# Patient Record
Sex: MALE | Race: WHITE | NOT HISPANIC OR LATINO
[De-identification: names, ages, dates, MRNs, and addresses within clinical notes are randomized per-mention and may not be internally consistent; named-entity substitution may affect disease eponyms.]

---

## 2017-05-05 ENCOUNTER — LABORATORY RESULT (OUTPATIENT)
Age: 82
End: 2017-05-05

## 2017-05-05 ENCOUNTER — APPOINTMENT (OUTPATIENT)
Dept: HEART AND VASCULAR | Facility: CLINIC | Age: 82
End: 2017-05-05

## 2017-05-05 VITALS
HEIGHT: 71 IN | HEART RATE: 63 BPM | SYSTOLIC BLOOD PRESSURE: 122 MMHG | DIASTOLIC BLOOD PRESSURE: 72 MMHG | BODY MASS INDEX: 19.88 KG/M2 | WEIGHT: 142 LBS

## 2017-05-05 DIAGNOSIS — Z00.00 ENCOUNTER FOR GENERAL ADULT MEDICAL EXAMINATION W/OUT ABNORMAL FINDINGS: ICD-10-CM

## 2017-05-05 DIAGNOSIS — E03.9 HYPOTHYROIDISM, UNSPECIFIED: ICD-10-CM

## 2017-05-05 DIAGNOSIS — Z82.49 FAMILY HISTORY OF ISCHEMIC HEART DISEASE AND OTHER DISEASES OF THE CIRCULATORY SYSTEM: ICD-10-CM

## 2017-05-05 DIAGNOSIS — N42.9 DISORDER OF PROSTATE, UNSPECIFIED: ICD-10-CM

## 2017-05-05 DIAGNOSIS — R73.9 HYPERGLYCEMIA, UNSPECIFIED: ICD-10-CM

## 2017-05-05 RX ORDER — TAMSULOSIN HCL 0.4 MG
0.4 CAPSULE ORAL
Refills: 0 | Status: ACTIVE | COMMUNITY

## 2017-05-05 RX ORDER — PNEUMOCOCCAL 13-VALENT CONJUGATE VACCINE 2.2; 2.2; 2.2; 2.2; 2.2; 4.4; 2.2; 2.2; 2.2; 2.2; 2.2; 2.2; 2.2 UG/.5ML; UG/.5ML; UG/.5ML; UG/.5ML; UG/.5ML; UG/.5ML; UG/.5ML; UG/.5ML; UG/.5ML; UG/.5ML; UG/.5ML; UG/.5ML; UG/.5ML
INJECTION, SUSPENSION INTRAMUSCULAR
Qty: 1 | Refills: 0 | Status: ACTIVE | COMMUNITY
Start: 2017-05-05 | End: 1900-01-01

## 2017-05-06 PROBLEM — Z82.49 FAMILY HISTORY OF ACUTE MYOCARDIAL INFARCTION: Status: ACTIVE | Noted: 2017-05-06

## 2017-05-06 PROBLEM — N42.9 PROSTATE DISORDER: Status: ACTIVE | Noted: 2017-05-06

## 2017-05-07 PROBLEM — R73.9 HYPERGLYCEMIA: Status: ACTIVE | Noted: 2017-05-07

## 2017-05-07 PROBLEM — E03.9 HYPOTHYROIDISM, ADULT: Status: ACTIVE | Noted: 2017-05-07

## 2017-05-09 LAB
ALBUMIN SERPL ELPH-MCNC: 4.5 G/DL
ALP BLD-CCNC: 71 U/L
ALT SERPL-CCNC: 17 U/L
ANION GAP SERPL CALC-SCNC: 18 MMOL/L
AST SERPL-CCNC: 19 U/L
BASOPHILS # BLD AUTO: 0.02 K/UL
BASOPHILS NFR BLD AUTO: 0.3 %
BILIRUB SERPL-MCNC: 0.6 MG/DL
BUN SERPL-MCNC: 15 MG/DL
CALCIUM SERPL-MCNC: 9.5 MG/DL
CHLORIDE SERPL-SCNC: 99 MMOL/L
CHOLEST SERPL-MCNC: 191 MG/DL
CHOLEST/HDLC SERPL: 3.5 RATIO
CO2 SERPL-SCNC: 24 MMOL/L
CREAT SERPL-MCNC: 0.98 MG/DL
EOSINOPHIL # BLD AUTO: 0.08 K/UL
EOSINOPHIL NFR BLD AUTO: 1 %
GLUCOSE SERPL-MCNC: 103 MG/DL
HBA1C MFR BLD HPLC: 5.5 %
HCT VFR BLD CALC: 45.3 %
HDLC SERPL-MCNC: 55 MG/DL
HGB BLD-MCNC: 15.1 G/DL
IMM GRANULOCYTES NFR BLD AUTO: 0.1 %
LDLC SERPL CALC-MCNC: 122 MG/DL
LYMPHOCYTES # BLD AUTO: 1.7 K/UL
LYMPHOCYTES NFR BLD AUTO: 21.7 %
MAN DIFF?: NORMAL
MCHC RBC-ENTMCNC: 31.7 PG
MCHC RBC-ENTMCNC: 33.3 GM/DL
MCV RBC AUTO: 95.2 FL
MONOCYTES # BLD AUTO: 0.59 K/UL
MONOCYTES NFR BLD AUTO: 7.5 %
NEUTROPHILS # BLD AUTO: 5.43 K/UL
NEUTROPHILS NFR BLD AUTO: 69.4 %
PLATELET # BLD AUTO: 245 K/UL
POTASSIUM SERPL-SCNC: 4.3 MMOL/L
PROT SERPL-MCNC: 7.5 G/DL
RBC # BLD: 4.76 M/UL
RBC # FLD: 13.1 %
SODIUM SERPL-SCNC: 141 MMOL/L
TRIGL SERPL-MCNC: 72 MG/DL
TSH SERPL-ACNC: 7.6 UIU/ML
WBC # FLD AUTO: 7.83 K/UL

## 2019-07-25 ENCOUNTER — EMERGENCY (EMERGENCY)
Facility: HOSPITAL | Age: 84
LOS: 1 days | Discharge: HOME | End: 2019-07-25
Attending: EMERGENCY MEDICINE | Admitting: EMERGENCY MEDICINE
Payer: MEDICARE

## 2019-07-25 VITALS
HEART RATE: 72 BPM | SYSTOLIC BLOOD PRESSURE: 145 MMHG | DIASTOLIC BLOOD PRESSURE: 71 MMHG | RESPIRATION RATE: 18 BRPM | TEMPERATURE: 99 F | OXYGEN SATURATION: 96 % | WEIGHT: 149.91 LBS

## 2019-07-25 VITALS
SYSTOLIC BLOOD PRESSURE: 148 MMHG | OXYGEN SATURATION: 97 % | DIASTOLIC BLOOD PRESSURE: 75 MMHG | RESPIRATION RATE: 18 BRPM | TEMPERATURE: 98 F | HEART RATE: 62 BPM
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
## 2019-07-25 DIAGNOSIS — R13.10 DYSPHAGIA, UNSPECIFIED: ICD-10-CM
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
## 2019-07-25 DIAGNOSIS — T18.128A FOOD IN ESOPHAGUS CAUSING OTHER INJURY, INITIAL ENCOUNTER: ICD-10-CM

## 2019-07-25 LAB
ANION GAP SERPL CALC-SCNC: 13 MMOL/L — SIGNIFICANT CHANGE UP (ref 5–17)
APTT BLD: 30.9 SEC — SIGNIFICANT CHANGE UP (ref 27.5–36.3)
BASOPHILS # BLD AUTO: 0.03 K/UL — SIGNIFICANT CHANGE UP (ref 0–0.2)
BASOPHILS NFR BLD AUTO: 0.3 % — SIGNIFICANT CHANGE UP (ref 0–2)
BUN SERPL-MCNC: 25 MG/DL — HIGH (ref 7–23)
CALCIUM SERPL-MCNC: 9.4 MG/DL — SIGNIFICANT CHANGE UP (ref 8.4–10.5)
CHLORIDE SERPL-SCNC: 107 MMOL/L — SIGNIFICANT CHANGE UP (ref 96–108)
CO2 SERPL-SCNC: 24 MMOL/L — SIGNIFICANT CHANGE UP (ref 22–31)
CREAT SERPL-MCNC: 1.12 MG/DL — SIGNIFICANT CHANGE UP (ref 0.5–1.3)
EOSINOPHIL # BLD AUTO: 0.02 K/UL — SIGNIFICANT CHANGE UP (ref 0–0.5)
EOSINOPHIL NFR BLD AUTO: 0.2 % — SIGNIFICANT CHANGE UP (ref 0–6)
EXTRA SST TUBE: SIGNIFICANT CHANGE UP
GLUCOSE SERPL-MCNC: 111 MG/DL — HIGH (ref 70–99)
HCT VFR BLD CALC: 41.4 % — SIGNIFICANT CHANGE UP (ref 39–50)
HGB BLD-MCNC: 13.1 G/DL — SIGNIFICANT CHANGE UP (ref 13–17)
IMM GRANULOCYTES NFR BLD AUTO: 0.2 % — SIGNIFICANT CHANGE UP (ref 0–1.5)
INR BLD: 1.06 — SIGNIFICANT CHANGE UP (ref 0.88–1.16)
LYMPHOCYTES # BLD AUTO: 1.48 K/UL — SIGNIFICANT CHANGE UP (ref 1–3.3)
LYMPHOCYTES # BLD AUTO: 16.1 % — SIGNIFICANT CHANGE UP (ref 13–44)
MCHC RBC-ENTMCNC: 29.4 PG — SIGNIFICANT CHANGE UP (ref 27–34)
MCHC RBC-ENTMCNC: 31.6 GM/DL — LOW (ref 32–36)
MCV RBC AUTO: 93 FL — SIGNIFICANT CHANGE UP (ref 80–100)
MONOCYTES # BLD AUTO: 0.9 K/UL — SIGNIFICANT CHANGE UP (ref 0–0.9)
MONOCYTES NFR BLD AUTO: 9.8 % — SIGNIFICANT CHANGE UP (ref 2–14)
NEUTROPHILS # BLD AUTO: 6.76 K/UL — SIGNIFICANT CHANGE UP (ref 1.8–7.4)
NEUTROPHILS NFR BLD AUTO: 73.4 % — SIGNIFICANT CHANGE UP (ref 43–77)
NRBC # BLD: 0 /100 WBCS — SIGNIFICANT CHANGE UP (ref 0–0)
PLATELET # BLD AUTO: 314 K/UL — SIGNIFICANT CHANGE UP (ref 150–400)
POTASSIUM SERPL-MCNC: 4.5 MMOL/L — SIGNIFICANT CHANGE UP (ref 3.5–5.3)
POTASSIUM SERPL-SCNC: 4.5 MMOL/L — SIGNIFICANT CHANGE UP (ref 3.5–5.3)
PROTHROM AB SERPL-ACNC: 12 SEC — SIGNIFICANT CHANGE UP (ref 10–12.9)
RBC # BLD: 4.45 M/UL — SIGNIFICANT CHANGE UP (ref 4.2–5.8)
RBC # FLD: 12.7 % — SIGNIFICANT CHANGE UP (ref 10.3–14.5)
SODIUM SERPL-SCNC: 144 MMOL/L — SIGNIFICANT CHANGE UP (ref 135–145)
WBC # BLD: 9.21 K/UL — SIGNIFICANT CHANGE UP (ref 3.8–10.5)
WBC # FLD AUTO: 9.21 K/UL — SIGNIFICANT CHANGE UP (ref 3.8–10.5)

## 2019-07-25 PROCEDURE — 96374 THER/PROPH/DIAG INJ IV PUSH: CPT

## 2019-07-25 PROCEDURE — 93010 ELECTROCARDIOGRAM REPORT: CPT

## 2019-07-25 PROCEDURE — 80048 BASIC METABOLIC PNL TOTAL CA: CPT

## 2019-07-25 PROCEDURE — 36415 COLL VENOUS BLD VENIPUNCTURE: CPT

## 2019-07-25 PROCEDURE — 71046 X-RAY EXAM CHEST 2 VIEWS: CPT | Mod: 26

## 2019-07-25 PROCEDURE — 96375 TX/PRO/DX INJ NEW DRUG ADDON: CPT

## 2019-07-25 PROCEDURE — 99284 EMERGENCY DEPT VISIT MOD MDM: CPT | Mod: 25

## 2019-07-25 PROCEDURE — 71046 X-RAY EXAM CHEST 2 VIEWS: CPT

## 2019-07-25 PROCEDURE — 99284 EMERGENCY DEPT VISIT MOD MDM: CPT

## 2019-07-25 PROCEDURE — 85025 COMPLETE CBC W/AUTO DIFF WBC: CPT

## 2019-07-25 PROCEDURE — 85730 THROMBOPLASTIN TIME PARTIAL: CPT

## 2019-07-25 PROCEDURE — 93005 ELECTROCARDIOGRAM TRACING: CPT

## 2019-07-25 PROCEDURE — 85610 PROTHROMBIN TIME: CPT

## 2019-07-25 RX ORDER — GLUCAGON INJECTION, SOLUTION 0.5 MG/.1ML
0.5 INJECTION, SOLUTION SUBCUTANEOUS ONCE
Refills: 0 | Status: COMPLETED | OUTPATIENT
Start: 2019-07-25 | End: 2019-07-25

## 2019-07-25 RX ORDER — METOCLOPRAMIDE HCL 10 MG
10 TABLET ORAL ONCE
Refills: 0 | Status: COMPLETED | OUTPATIENT
Start: 2019-07-25 | End: 2019-07-25

## 2019-07-25 RX ORDER — SODIUM CHLORIDE 9 MG/ML
1000 INJECTION INTRAMUSCULAR; INTRAVENOUS; SUBCUTANEOUS ONCE
Refills: 0 | Status: COMPLETED | OUTPATIENT
Start: 2019-07-25 | End: 2019-07-25

## 2019-07-25 RX ADMIN — GLUCAGON INJECTION, SOLUTION 0.5 MILLIGRAM(S): 0.5 INJECTION, SOLUTION SUBCUTANEOUS at 15:16

## 2019-07-25 RX ADMIN — Medication 10 MILLIGRAM(S): at 15:16

## 2019-07-25 RX ADMIN — SODIUM CHLORIDE 1000 MILLILITER(S): 9 INJECTION INTRAMUSCULAR; INTRAVENOUS; SUBCUTANEOUS at 15:16

## 2019-07-25 NOTE — ED PROVIDER NOTE - OBJECTIVE STATEMENT
87 yo M with hx of esophageal obstruction in past presenting with inability to tolerate PO, spitting up since breakfast eating [place] galeano when patient suddenly felt something stuck in throat.  No chest pain, sob, back pain, weakness, numbness, tingling.  Pt with similar in past.

## 2019-07-25 NOTE — ED PROVIDER NOTE - CARE PROVIDER_API CALL
Ric Briceno (MD)  Gastroenterology; Internal Medicine  [address], [address] [place] [address]  Phone: [phone]  Fax: [phone]  Follow Up Time: Routine

## 2019-07-25 NOTE — ED PROVIDER NOTE - CLINICAL SUMMARY MEDICAL DECISION MAKING FREE TEXT BOX
Pt with ev of food bolus obstruction.  Given glucacon and GI consulted.  Improved in ED.  No indication for emergent endoscopy.  Now tolerating PO.  Do not suspect stroke.  Will need outpt fu and possible endoscopy.  No ev of perforation or other cause ie: cardiac or dissection.

## 2019-07-25 NOTE — ED PROVIDER NOTE - NSFOLLOWUPINSTRUCTIONS_ED_ALL_ED_FT
Food Impaction    WHAT YOU NEED TO KNOW:    Food impaction occurs when food (often meat or fish bones) becomes stuck in your esophagus. Food impaction can occur if your esophagus does not function normally. Food impaction may also happen if you do not have teeth or do not chew your food completely.     DISCHARGE INSTRUCTIONS:    Return to the emergency department if:     You are unable to swallow your saliva.      Your breathing is noisy.      You have trouble breathing.      You have repeated vomiting or blood in your vomit.      You have pain in your chest or abdomen.    When should I contact my healthcare provider?     You feel you have something stuck in your throat.      You choke or vomit after eating.      You have questions or concerns about your condition or care.     Follow up with your healthcare provider as directed: You may need tests to check your ability to swallow. Write down your questions so you remember to ask them during your visits.    Prevent food impaction:     Sit up while you eat. Raise the head of your bed if you need to eat in bed.      Make sure your dentures fit well. Poorly fitting dentures make it difficult to chew.       Chew your food completely. Proper chewing will help with swallowing.       Drink water with meals. Water will help move food down to your stomach.          © Copyright [employer] 2019 All illustrations and images included in CareNotes are the copyrighted property of [employer] Inc[employer] or [employer]

## 2019-07-25 NOTE — CONSULT NOTE ADULT - SUBJECTIVE AND OBJECTIVE BOX
HPI:  88M w/ PMHx of HTN, hypothyroidism, BPH, and recent spinal fusion, hx fo food impaction p/w dysphagia.  Patient was eating galeano and egg, denies any bone, around 12PM when he noted food getting stucked.  He had onset of spitup, hiccup, and dysphagia.  He couldnt tolerate liquid or solids.  As symptoms persisted, he came to the ED.  IN the ED, he noted improvement in in symptoms but continue to have dysphagia.  At time of evaluation, he noted further improvement with full resolution of symptoms.  He denies f/c, cp, sob, abd pain, n/v/d, coughing, dysuria.      Family at bedside reports that he had similar episode of food getting stuck around 5 yr ago that resolved with "injection."  Reports that he did not need any endoscopy procedure.      Allergies    penicillin (Rash)    Intolerances      Home Medications:  Synthroid 50 mcg (0.05 mg) oral tablet: 1 tab(s) orally once a day (07 Feb 2016 16:09)  Benazepril  Tamsulosin  Finasteride    MEDICATIONS:  MEDICATIONS  (STANDING):    MEDICATIONS  (PRN):    PAST MEDICAL & SURGICAL HISTORY:  Esophageal spasm  HTN (hypertension)  Hypothyroidism  No significant past surgical history    FAMILY HISTORY: Denies fam hx of GI cancer or disease    SOCIAL HISTORY:  Tobacco: [ ] Current, [ ] Former, [ x] Never; Pack Years:  Alcohol: denies  Illicit Drugs: denies    REVIEW OF SYSTEMS:  All other 10 review of systems is negative except as indicated in HPI    Vital Signs Last 24 Hrs  T(C): 37.2 (25 Jul 2019 14:13), Max: 37.2 (25 Jul 2019 14:13)  T(F): 99 (25 Jul 2019 14:13), Max: 99 (25 Jul 2019 14:13)  HR: 72 (25 Jul 2019 14:13) (72 - 72)  BP: 145/71 (25 Jul 2019 14:13) (145/71 - 145/71)  BP(mean): --  RR: 18 (25 Jul 2019 14:13) (18 - 18)  SpO2: 96% (25 Jul 2019 14:13) (96% - 96%)      PHYSICAL EXAM:    General: Well developed; well nourished; in no acute distress  Eyes: moist conjunctivae, sclerae anicteric  HENT: Moist mucous membranes, tongue midline  Neck: Trachea midline, supple  Lungs: Normal respiratory effort and no intercostal retractions  Cardiovascular: RRR, S1S2  Abdomen: Soft, non-tender non-distended; Normal bowel sounds; No rebound or guarding  Extremities: Normal range of motion, No clubbing, cyanosis or edema  Neurological: Alert and oriented x3, nonfocal exam  Skin: Warm and dry. No obvious rash    LABS:                        13.1   9.21  )-----------( 314      ( 25 Jul 2019 15:09 )             41.4               PT/INR - ( 25 Jul 2019 15:09 )   PT: 12.0 sec;   INR: 1.06          PTT - ( 25 Jul 2019 15:09 )  PTT:30.9 sec

## 2019-07-25 NOTE — CONSULT NOTE ADULT - ASSESSMENT
88M w/ PMHx of HTN, hypothyroidism, BPH, and recent spinal fusion, hx of food impaction p/w dysphagia immediately during food consumption.  GI consulted for concern of food impaction.  Patient reports resolution of symptoms prior to administration of glucagon.  Wife at bedside also reports prior similar event around 5yr ago that resolved shortly after "injection."  Suspect passing of food from the esophagus given clinical improvement and unlikely to require urgent endoscopy.  -Trial of PO after glucagon  -Monitor symptoms closely  -Please notify GI if symptoms worsen 88M w/ PMHx of HTN, hypothyroidism, BPH, and recent spinal fusion, hx of food impaction p/w dysphagia immediately during food consumption.  GI consulted for concern of food impaction.  Patient reports resolution of symptoms prior to administration of glucagon.  Wife at bedside also reports prior similar event around 5yr ago that resolved shortly after "injection."  Suspect passing of food from the esophagus given clinical improvement and unlikely to require urgent endoscopy.  -Trial of PO after glucagon  -Monitor symptoms closely  -Please notify GI if symptoms worsen    Addendum:  Patient noted improvement in symptoms and tolerated PO following glucagon.  -Patient may be discharged from GI perspective

## 2019-07-25 NOTE — ED ADULT NURSE NOTE - NSIMPLEMENTINTERV_GEN_ALL_ED
Implemented All Universal Safety Interventions:  [place] to call system. Call bell, personal items and telephone within reach. Instruct patient to call for assistance. Room bathroom lighting operational. Non-slip footwear when patient is off stretcher. Physically safe environment: no spills, clutter or unnecessary equipment. Stretcher in lowest position, wheels locked, appropriate side rails in place.

## 2019-07-25 NOTE — ED PROVIDER NOTE - ENMT, MLM
Spitting up unable to tolerate water, no stridor, nl posterior pharynx, no upper airway obstruction.

## 2019-07-25 NOTE — ED ADULT TRIAGE NOTE - CHIEF COMPLAINT QUOTE
pt having difficulty swallowing this am , was eating earlier and started coughing and is worried about possible aspiration , hx dysphagia

## 2019-07-25 NOTE — ED ADULT NURSE NOTE - OBJECTIVE STATEMENT
Pt presents with difficulty swallowing beginning 3 hrs ago. Pt reports he was eating galeano and eggs when he suddenly felt that some of the food was stuck. Pt reports hx of episodic dysphagia, states last episode 6 years ago. PT presents with clear breath sounds, no shortness of breath, speaking in full sentences, appears to have no difficulty handling secretions. Dysphagia screen completed on arrival elicits no cough, wet voice, or drooling but pt states "I feel like the water didn't go down" after attempting to swallow 5cc.

## 2019-07-31 ENCOUNTER — INBOUND DOCUMENT (OUTPATIENT)
Age: 84
End: 2019-07-31

## 2021-12-09 ENCOUNTER — INPATIENT (INPATIENT)
Facility: HOSPITAL | Age: 86
LOS: 1 days | Discharge: HOME | DRG: 176 | End: 2021-12-11
Payer: MEDICARE

## 2021-12-09 VITALS
HEIGHT: 71 IN | RESPIRATION RATE: 18 BRPM | SYSTOLIC BLOOD PRESSURE: 196 MMHG | DIASTOLIC BLOOD PRESSURE: 98 MMHG | TEMPERATURE: 98 F | OXYGEN SATURATION: 97 % | HEART RATE: 94 BPM | WEIGHT: 145.06 LBS

## 2021-12-09 DIAGNOSIS — I26.99 OTHER PULMONARY EMBOLISM WITHOUT ACUTE COR PULMONALE: ICD-10-CM

## 2021-12-09 DIAGNOSIS — R63.8 OTHER SYMPTOMS AND SIGNS CONCERNING FOOD AND FLUID INTAKE: ICD-10-CM

## 2021-12-09 DIAGNOSIS — R09.89 OTHER SPECIFIED SYMPTOMS AND SIGNS INVOLVING THE CIRCULATORY AND RESPIRATORY SYSTEMS: ICD-10-CM

## 2021-12-09 DIAGNOSIS — K22.4 DYSKINESIA OF ESOPHAGUS: ICD-10-CM

## 2021-12-09 DIAGNOSIS — N40.0 BENIGN PROSTATIC HYPERPLASIA WITHOUT LOWER URINARY TRACT SYMPTOMS: ICD-10-CM

## 2021-12-09 DIAGNOSIS — I10 ESSENTIAL (PRIMARY) HYPERTENSION: ICD-10-CM

## 2021-12-09 LAB
ALBUMIN SERPL ELPH-MCNC: 3.4 G/DL — SIGNIFICANT CHANGE UP (ref 3.3–5)
ALP SERPL-CCNC: 73 U/L — SIGNIFICANT CHANGE UP (ref 40–120)
ALT FLD-CCNC: 11 U/L — SIGNIFICANT CHANGE UP (ref 10–45)
ANION GAP SERPL CALC-SCNC: 8 MMOL/L — SIGNIFICANT CHANGE UP (ref 5–17)
APTT BLD: 31.8 SEC — SIGNIFICANT CHANGE UP (ref 27.5–35.5)
AST SERPL-CCNC: 12 U/L — SIGNIFICANT CHANGE UP (ref 10–40)
BASOPHILS # BLD AUTO: 0.03 K/UL — SIGNIFICANT CHANGE UP (ref 0–0.2)
BASOPHILS NFR BLD AUTO: 0.3 % — SIGNIFICANT CHANGE UP (ref 0–2)
BILIRUB SERPL-MCNC: 0.4 MG/DL — SIGNIFICANT CHANGE UP (ref 0.2–1.2)
BUN SERPL-MCNC: 15 MG/DL — SIGNIFICANT CHANGE UP (ref 7–23)
CALCIUM SERPL-MCNC: 9.3 MG/DL — SIGNIFICANT CHANGE UP (ref 8.4–10.5)
CHLORIDE SERPL-SCNC: 104 MMOL/L — SIGNIFICANT CHANGE UP (ref 96–108)
CO2 SERPL-SCNC: 27 MMOL/L — SIGNIFICANT CHANGE UP (ref 22–31)
CREAT SERPL-MCNC: 1.06 MG/DL — SIGNIFICANT CHANGE UP (ref 0.5–1.3)
EOSINOPHIL # BLD AUTO: 0.04 K/UL — SIGNIFICANT CHANGE UP (ref 0–0.5)
EOSINOPHIL NFR BLD AUTO: 0.4 % — SIGNIFICANT CHANGE UP (ref 0–6)
GLUCOSE SERPL-MCNC: 134 MG/DL — HIGH (ref 70–99)
HCT VFR BLD CALC: 38.1 % — LOW (ref 39–50)
HGB BLD-MCNC: 12.4 G/DL — LOW (ref 13–17)
IMM GRANULOCYTES NFR BLD AUTO: 0.1 % — SIGNIFICANT CHANGE UP (ref 0–1.5)
INR BLD: 1.07 — SIGNIFICANT CHANGE UP (ref 0.88–1.16)
LYMPHOCYTES # BLD AUTO: 1.08 K/UL — SIGNIFICANT CHANGE UP (ref 1–3.3)
LYMPHOCYTES # BLD AUTO: 11.4 % — LOW (ref 13–44)
MCHC RBC-ENTMCNC: 29.3 PG — SIGNIFICANT CHANGE UP (ref 27–34)
MCHC RBC-ENTMCNC: 32.5 GM/DL — SIGNIFICANT CHANGE UP (ref 32–36)
MCV RBC AUTO: 90.1 FL — SIGNIFICANT CHANGE UP (ref 80–100)
MONOCYTES # BLD AUTO: 1.05 K/UL — HIGH (ref 0–0.9)
MONOCYTES NFR BLD AUTO: 11.1 % — SIGNIFICANT CHANGE UP (ref 2–14)
NEUTROPHILS # BLD AUTO: 7.26 K/UL — SIGNIFICANT CHANGE UP (ref 1.8–7.4)
NEUTROPHILS NFR BLD AUTO: 76.7 % — SIGNIFICANT CHANGE UP (ref 43–77)
NRBC # BLD: 0 /100 WBCS — SIGNIFICANT CHANGE UP (ref 0–0)
PLATELET # BLD AUTO: 233 K/UL — SIGNIFICANT CHANGE UP (ref 150–400)
POTASSIUM SERPL-MCNC: 3.9 MMOL/L — SIGNIFICANT CHANGE UP (ref 3.5–5.3)
POTASSIUM SERPL-SCNC: 3.9 MMOL/L — SIGNIFICANT CHANGE UP (ref 3.5–5.3)
PROT SERPL-MCNC: 7.3 G/DL — SIGNIFICANT CHANGE UP (ref 6–8.3)
PROTHROM AB SERPL-ACNC: 12.8 SEC — SIGNIFICANT CHANGE UP (ref 10.6–13.6)
RBC # BLD: 4.23 M/UL — SIGNIFICANT CHANGE UP (ref 4.2–5.8)
RBC # FLD: 13.2 % — SIGNIFICANT CHANGE UP (ref 10.3–14.5)
SARS-COV-2 RNA SPEC QL NAA+PROBE: NEGATIVE — SIGNIFICANT CHANGE UP
SODIUM SERPL-SCNC: 139 MMOL/L — SIGNIFICANT CHANGE UP (ref 135–145)
TROPONIN T SERPL-MCNC: 0.01 NG/ML — SIGNIFICANT CHANGE UP (ref 0–0.01)
WBC # BLD: 9.47 K/UL — SIGNIFICANT CHANGE UP (ref 3.8–10.5)
WBC # FLD AUTO: 9.47 K/UL — SIGNIFICANT CHANGE UP (ref 3.8–10.5)

## 2021-12-09 PROCEDURE — 93010 ELECTROCARDIOGRAM REPORT: CPT

## 2021-12-09 PROCEDURE — 75574 CT ANGIO HRT W/3D IMAGE: CPT | Mod: 26,MA

## 2021-12-09 PROCEDURE — 99223 1ST HOSP IP/OBS HIGH 75: CPT | Mod: GC

## 2021-12-09 PROCEDURE — 71275 CT ANGIOGRAPHY CHEST: CPT | Mod: 26,MA

## 2021-12-09 PROCEDURE — 99285 EMERGENCY DEPT VISIT HI MDM: CPT | Mod: 25

## 2021-12-09 PROCEDURE — 71045 X-RAY EXAM CHEST 1 VIEW: CPT | Mod: 26

## 2021-12-09 PROCEDURE — 93970 EXTREMITY STUDY: CPT | Mod: 26

## 2021-12-09 PROCEDURE — 74174 CTA ABD&PLVS W/CONTRAST: CPT | Mod: 26,MA

## 2021-12-09 RX ORDER — LISINOPRIL 2.5 MG/1
10 TABLET ORAL DAILY
Refills: 0 | Status: DISCONTINUED | OUTPATIENT
Start: 2021-12-10 | End: 2021-12-09

## 2021-12-09 RX ORDER — LABETALOL HCL 100 MG
10 TABLET ORAL ONCE
Refills: 0 | Status: COMPLETED | OUTPATIENT
Start: 2021-12-09 | End: 2021-12-09

## 2021-12-09 RX ORDER — SODIUM CHLORIDE 9 MG/ML
1000 INJECTION INTRAMUSCULAR; INTRAVENOUS; SUBCUTANEOUS
Refills: 0 | Status: DISCONTINUED | OUTPATIENT
Start: 2021-12-09 | End: 2021-12-10

## 2021-12-09 RX ORDER — LISINOPRIL 2.5 MG/1
10 TABLET ORAL DAILY
Refills: 0 | Status: DISCONTINUED | OUTPATIENT
Start: 2021-12-09 | End: 2021-12-11

## 2021-12-09 RX ORDER — ENOXAPARIN SODIUM 100 MG/ML
60 INJECTION SUBCUTANEOUS ONCE
Refills: 0 | Status: DISCONTINUED | OUTPATIENT
Start: 2021-12-10 | End: 2021-12-10

## 2021-12-09 RX ORDER — FINASTERIDE 5 MG/1
5 TABLET, FILM COATED ORAL DAILY
Refills: 0 | Status: DISCONTINUED | OUTPATIENT
Start: 2021-12-09 | End: 2021-12-11

## 2021-12-09 RX ORDER — ENOXAPARIN SODIUM 100 MG/ML
60 INJECTION SUBCUTANEOUS ONCE
Refills: 0 | Status: COMPLETED | OUTPATIENT
Start: 2021-12-09 | End: 2021-12-09

## 2021-12-09 RX ADMIN — FINASTERIDE 5 MILLIGRAM(S): 5 TABLET, FILM COATED ORAL at 23:32

## 2021-12-09 RX ADMIN — Medication 10 MILLIGRAM(S): at 10:09

## 2021-12-09 RX ADMIN — ENOXAPARIN SODIUM 60 MILLIGRAM(S): 100 INJECTION SUBCUTANEOUS at 15:59

## 2021-12-09 RX ADMIN — LISINOPRIL 10 MILLIGRAM(S): 2.5 TABLET ORAL at 23:32

## 2021-12-09 RX ADMIN — SODIUM CHLORIDE 100 MILLILITER(S): 9 INJECTION INTRAMUSCULAR; INTRAVENOUS; SUBCUTANEOUS at 15:59

## 2021-12-09 NOTE — H&P ADULT - PROBLEM SELECTOR PLAN 2
Patient with history of BPH on home finasteride 5 mg and silodosin 4 mg.     - c/w home finasteride 5 mg  - f/u with pharmacy about appropriate tamsulosin conversion dose, then c/w tamsulosin while inpatient.

## 2021-12-09 NOTE — H&P ADULT - PROBLEM SELECTOR PLAN 4
Patient with history of esophageal spasm causing food impaction multiple years ago. No active complaints.    - continue to monitor

## 2021-12-09 NOTE — H&P ADULT - NSHPSOCIALHISTORY_GEN_ALL_CORE
Patient lives with wife at home. Ambulates with cane. Formerly occasionally smoked a tobacco pipe, denies alcohol or drug use.

## 2021-12-09 NOTE — ED ADULT NURSE NOTE - HIV OFFER
Physical Therapy Daily Treatment     Visit Count: 5    Plan of Care Dates: Initial: 11/30/2017 Through: 2/22/2018     Insurance Information: medical necessity   Next Referring Provider Visit: none     Referred by: Theo Austin MD  Medical Diagnosis (from order): M75.100 Rotator cuff syndrome, unspecified laterality   Treatment Diagnosis: Shoulder Symptoms with Pain, Impaired Posture, Impaired Joint Mobility, Impaired Range of Motion and Impaired Motor Function/Muscle Performance  Insurance: 1. [employer]  2. N/A     Date of onset/injury: 4 months ago     Diagnosis Precautions: none  Chart reviewed: Relevant co-morbidities, allergies, tests and medications: borderline diabetic  History - past medical        Past Medical History:   Diagnosis Date   • Cubital tunnel syndrome 11/3/2014   • Glaucoma (increased eye pressure)     • Type II or unspecified type diabetes mellitus without mention of complication, not stated as uncontrolled 9/10/2013              Current Outpatient Prescriptions   Medication Sig   • atorvastatin (LIPITOR) 20 MG tablet TAKE 1 TABLET DAILY   • metFORMIN (GLUCOPHAGE) 500 MG tablet Take 1 tablet by mouth 2 times daily (with meals).   • aspirin 81 MG tablet Take 1 tablet by mouth daily.      No current facility-administered medications for this encounter.        SUBJECTIVE   Patient completed medrol pack which felt good for a while-never did get complete function and pain relief in L hand but B shoulders feel better. Patient states he woke up with some neck stiffness today. Uses hot pad for hand. Splint on L UE limits ability to sleep.   Current Pain: 1/10.  (left and right about the same-Left might be a little bit worse  Functional Change: less pain in left hand with -improved strength in left hand    OBJECTIVE   Hand Dominance: right     Posture/Observation:  Increased thoracic kyphosis, forward head.  Well healed scar.     Range of Motion (degrees)  [] All motions within  functional/normal limits except those noted.    [] Only those motions that were assessed are noted.  [] Uninvolved extremity motion within functional/normal limits.    Norm Left Right Left  Right   Shoulder                    Date   Initial Initial 12/12/2017 12/12/2017     Flexion 170-180 150 140 155 155   Extension 50-60 65 68 55 55   Abduction 170-180 170 150 170 165   Adduction 50-75 n/a n/a nt nt   Internal Rotation   60 50 68 62   External  Rotation 80-90 86 75 90 85   standard testing positions unless otherwise noted; Key: ranges are reported in active range of motion unless noted as AA=active assistive or P=passive range of motion, * denotes pain      Scapular Assessment    Left Right   Resting Position elevated elevated, abduction   Scapulohumeral Rhythm increased scapular motion early/excessive upward rotation, poor eccentric control      Strength (out of 5)  [] Gross muscle strength within functional/normal limits except as noted.  [] Only muscle strength that was assessed are noted.  [] Uninvolved muscle strength within functional/normal limits.    Left Right   Date Initial Initial   Shoulder Flexion 4+ 4   Shoulder Extension       Shoulder Abduction 4+ 4   Shoulder Adduction       Shoulder Internal Rotation       Shoulder External Rotation 4 4-   Elbow Flexion       Elbow Extension       standard testing positions unless otherwise noted,* denotes pain  Comments:      Muscle Flexibility:  Latissimus - Left: moderate restriction, Right: moderate restriction  Pectoralis Major - Left: moderate restriction, Right: moderate restriction  Pectoralis Minor - Left: moderate restriction, Right: moderate restriction      Palpation:  Tender at GH joint line as well as AC joint     Joint Play Assessment:  Limited post and inferior glide     Special Tests:  Crossover Impingement Test: Positive bilaterally for impingement of supraspinatus, subscapularis, infraspinatus, teres minor, posterior capsule,  acromioclavicular joint, and long head of the biceps  Empty Can Test: Positive right for supraspinatus tendinitis     Outcome Measures:   Disabilities of the Arm, Shoulder and Hand (DASH): 18 (scored 0-100; a higher score indicates greater disability)     Treatment   Therapeutic Exercise:   Review of IR stretch with towel/dowel 3 x 10-20 seconds* (gentle/painfree)  Thoracic extension stretch over chair 3 sets at varying heights  Brief verbal review of submaximal and painfree isometrics 5 x 5 s (flexion, abduction, IR)*     Progressed isometric ER to to bilateral ER with band just beyond neutral with towels under arm*  Pec stretch 2 x 45 seconds*  Single arm rows with staggered stance 2# 1 x 15 each* (showed rowing with band as alternative for posture)  PT assisted posterior capsule stretch in supine (left giovanna well/right with some pinching in superior aspect of shoulder)  Lower trap lift offs with emphasis on lower trap and thoracic extension 5 x each    Manual Therapy; for mobilizations, manipulations, manual lymphatic drainage, manual traction, and myofascial release to  Improve soft tissue and joint mobility, decrease muscle spasm.guarding,  promote healing and decrease pain.  Bilateral shoulders:   -Inferior glides, AP, lateral glides  -Gentle PROM stretching-emphasis ER/IR  -Curve glide stretch at end range IR, B  -Brief mid thoracic extension glides in seated position with upper extremity support on table    Iontophoresis (37691): Deferred (painfree due to medrol pack)  Patient has been made aware of potential contraindications and possible risks associated with the use of modality and has agreed.  Location: R GH joint at supraspinatus insertion  Extra Strength: 120mA-min with 8 hour average patient wear time.  Active pad: negative pole; 1.0 ml dexamethasone sodium phosphate, 4mg/ml  Inactive pad: positive pole; 1 ml sterile saline  Instruction: remove electrode 12 hours after treatment; monitor any skin  reaction.  Results: no change in symptoms immediately following modality; no adverse reaction to treatment    Current Home Program (not performed this date except as noted above):   Patient received initial instruction in exercises marked above with \"*\"above, issued handout.  Supine dowel assisted flexion x 10-30 reps  Dowel assisted ER x 10-30 seated  Bent over rowing    pec stretch in corner x 30-60 seconds  IR towel stretch    ASSESSMENT   Continue to reinforce/caution patient to progress gradually as symptoms may be masked by medication. Progressing in PROM/AROM. Decreased thoracic extension limiting B UE flexion-reinforced with addition of chair extension stretch.    Pain after treatment: 0/10  Result of above outlined education: Verbalizes understanding and Demonstrates understanding    Goals:       To be obtained by end of this plan of care:  1. Patient independent with modified and progressed home exercise program.  2. Patient will decrease involved shoulder pain/symptoms to 2/10  to aid in age appropriate activities.   3. Patient will increase involved shoulder active assisted range of motion to flexion 155°, external rotation 85° to aid in completion of self-care tasks/dressing.   4. Patient will increase involved shoulder strength to 4+/5 to aid in tolerating repetitive overhead/upper extremity activity.  5. Patient will increase IR to 65-70 B  to aid in tuck in shirt.     PLAN   Review and progress HEP, inferior glides, PROM stretching/moblilization as indicated    THERAPY DAILY BILLING   Primary Insurance:  [employer]HE[employer]/BCBS  Secondary Insurance: N/A    Evaluation Procedures:  No evaluation codes were used on this date of service    Timed Procedures:  Manual Therapy, 15 minutes  Therapeutic Exercise, 15 minutes    Untimed Procedures:  No untimed codes were used on this date of service    Total Treatment Time: 30 minutes       Opt out

## 2021-12-09 NOTE — H&P ADULT - HISTORY OF PRESENT ILLNESS
89 y/o M PMHx HTN, hypothyroidism, esophageal spasms, BPH. Presents c/o chest pain since last night. describes pain as a constant, dull ache to the left side of the chest that is better when sitting upright and worse when lying supine. Wife checked blood pressure and noted Pt to be elevated. Denies radiation of pain, SOB, palpitations, dizziness, nausea, diaphoresis, or syncope. Pt had a biopsy to the left chest wall a few days ago without complications. No fevers, chills or URI symptoms. No personal or family h/o CAD or thromboembolism.    In the ED,  Vitals: T 97.9 /98 HR 94 RR 18 97% on RA   Labs: Hgb 12.4, trop (-)   Imaging:    EKG: NSR    CXR: Mild congestion. Small left pleural effusion and left basilar atelectasis or small infiltrate.    CTA with evidence of b/l pulmonary embolism   ED Course: s/p labetalol 10 mg IV       89 y/o M PMHx HTN, hypothyroidism, peripheral neuropathy (onset 3 years ago after spinal surgery), esophageal spasms, BPH presenting with chest pain since last night. Patient had acute onset L sided pain in the chest that alleviated when sitting up and worsened when lying down. Patient denies any headache, changes in vision, radiation of pain, palpitations, SOB, diaphoresis, nausea, vomiting, fevers, chills or cough. Patient had b/l leg swelling that resolved a few weeks ago. Patient is ambulatory with a cane. No recent travel. No sicks contacts. Patient no personal or family history of thromboembolism. Patient has PCP Dr. Bunn ([facility] however has never had colonscopy, unaware if PSA screening.     In the ED,  Vitals: T 97.9 /98 HR 94 RR 18 97% on RA   Labs: Hgb 12.4, trop (-)   Imaging:    EKG: NSR    CXR: Mild congestion. Small left pleural effusion and left basilar atelectasis or small infiltrate.    CTA with evidence of b/l pulmonary embolism   ED Course: s/p labetalol 10 mg IV

## 2021-12-09 NOTE — CHART NOTE - NSCHARTNOTEFT_GEN_A_CORE
Bladder scan showed more than 650 cc urine retained but patient refused straight cath. Went to patient bed twice, explaining risk of refusing straight cath including bladder rupture, infection, and death. Patient voiced understanding of risk and still refused straight cath. Patient denied pain or discomfort in suprapubic or groin area.

## 2021-12-09 NOTE — H&P ADULT - PROBLEM/PLAN-3
1659 [facility] 600 1007 [facility] 
[phone] Patient: Nadiya Dhillon MRN: [id number] HCU:[id number] Visit Information Date & Time Provider Department Dept. Phone Encounter #  
 9/21/2018  3:40 PM Efren Ambriz MD [facility] Michelle Lobo [phone] [id number] Follow-up Instructions Return in about 3 months (around 12/21/2018). Your Appointments 10/11/2018  1:15 PM  
ESTABLISHED PATIENT with Dino Libman Lobb, DO 59[address] [facility] ([address]) Appt Note: [address] [address]  
[phone]  
  
   
 [address] [address]  
  
    
 1/8/2019  4:00 PM  
ESTABLISHED PATIENT with Efren Ambriz MD  
[facility] ([facility]) Appt Note: 3 mo fu  
 320 [facility] 600 1007 [facility]  
[address] [facility] [address] [address] Upcoming Health Maintenance Date Due  
 FOOT EXAM Q1 12/14/1967 EYE EXAM RETINAL OR DILATED Q1 12/14/1967 Pneumococcal 19-64 Highest Risk (1 of 3 - PCV13) 12/14/1976 DTaP/Tdap/Td series (1 - Tdap) 12/14/1978 PAP AKA CERVICAL CYTOLOGY 12/14/1978 BREAST CANCER SCRN MAMMOGRAM 12/14/2007 LIPID PANEL Q1 11/9/2015 ZOSTER VACCINE AGE 60> 10/14/2017 FOBT Q 1 YEAR AGE 50-75 2/18/2018 MEDICARE YEARLY EXAM 5/18/2018 Influenza Age 5 to Adult 8/1/2019* HEMOGLOBIN A1C Q6M 11/30/2018 *Topic was postponed. The date shown is not the original due date. Allergies as of 9/21/2018  Review Complete On: 9/21/2018 By: Joseph Engel  
  
 Severity Noted Reaction Type Reactions Contrast Dye [Iodine] High 02/28/2011   Systemic Anaphylaxis Tolerates when pre medicated w/ IV solumedrol and benadryl prior to procedure Levaquin [Levofloxacin]  10/13/2013    Nausea and Vomiting Morphine  02/28/2011   Side Effect Hives, Itching Current Immunizations  Never Reviewed Name Date Influenza Vaccine 12/5/2017 Not reviewed this visit You Were Diagnosed With   
  
 Codes Comments Atherosclerosis of native coronary artery of native heart with stable angina pectoris (RUST 75.)    -  Primary ICD-10-CM: I25.118 
ICD-9-CM: 414.01, 413.9 Angina, class III (RUST 75.)     ICD-10-CM: I20.9 ICD-9-CM: 413.9 Chronic diastolic heart failure (HCC)     ICD-10-CM: I50.32 
ICD-9-CM: 428.32 Hx of deep venous thrombosis     ICD-10-CM: X25.529 ICD-9-CM: V12.51 Type 2 diabetes mellitus with stage 4 chronic kidney disease, with long-term current use of insulin (HCC)     ICD-10-CM: E11.22, N18.4, Z79.4 ICD-9-CM: 250.40, 585.4, V58.67 Essential hypertension     ICD-10-CM: I10 
ICD-9-CM: 401.9 JASEN on CPAP     ICD-10-CM: G47.33, Z99.89 ICD-9-CM: 327.23, V46.8 Pulmonary HTN (RUST 75.)     ICD-10-CM: I27.20 ICD-9-CM: 416.8 Vitals BP Pulse Height(growth percentile) Weight(growth percentile) SpO2 BMI  
 110/50 (BP 1 Location: Right arm, BP Patient Position: Sitting) 78 5' 10\" (1.778 m) 304 lb (137.9 kg) 93% 43.62 kg/m2 OB Status Smoking Status Hysterectomy Former Smoker Vitals History BMI and BSA Data Body Mass Index Body Surface Area  
 43.62 kg/m 2 2.61 m 2 Preferred Pharmacy Pharmacy Name Phone [id number], [address] [place] [address] [phone] Your Updated Medication List  
  
   
This list is accurate as of 9/21/18  4:14 PM.  Always use your most recent med list.  
  
  
  
  
 * PROAIR HFA 90 mcg/actuation inhaler Generic drug:  albuterol Take 2 Puffs by inhalation every four (4) hours as needed for Wheezing. * albuterol 2.5 mg /3 mL (0.083 %) nebulizer solution Commonly known as:  PROVENTIL VENTOLIN  
2.5 mg by Nebulization route every four (4) hours as needed. Allergy 25 mg capsule Generic drug:  diphenhydrAMINE TAKE 2 CAPSULES BY MOUTH 1 HOUR PRIOR TO PROCEDURE  
  
 allopurinol 100 mg tablet Commonly known as:  Cecile Piotr Take 100 mg by mouth daily. amLODIPine 10 mg tablet Commonly known as:  Sonny Garrison Take 10 mg by mouth daily. aspirin 81 mg tablet Take 81 mg by mouth daily. atorvastatin 80 mg tablet Commonly known as:  LIPITOR Take 80 mg by mouth nightly. * bumetanide 1 mg tablet Commonly known as:  Hortencia Cobb Take 2 Tabs by mouth two (2) times a day. * bumetanide 2 mg tablet Commonly known as:  Hortencia Cobb TAKE 1 TABLET BY MOUTH TWICE A DAY  
  
 calcitRIOL 0.25 mcg capsule Commonly known as:  ROCALTROL Take 0.25 mcg by mouth every Monday, Wednesday, Friday, Saturday. carvedilol 25 mg tablet Commonly known as:  COREG  
TAKE 1 TABLET BY MOUTH TWICE A DAY FLONASE 50 mcg/actuation nasal spray Generic drug:  fluticasone 2 Sprays by Both Nostrils route nightly as needed. isosorbide mononitrate  mg CR tablet Commonly known as:  IMDUR  
TAKE 1 TABLET BY MOUTH EVERY DAY  
  
 lidocaine 2 % jelly Commonly known as:  XYLOCAINE Use TID as needed  
  
 lidocaine 5 % ointment Commonly known as:  XYLOCAINE Apply  to affected area as needed for Pain.  
  
 lidocaine-prilocaine topical cream  
Commonly known as:  EMLA  
APPLY TO FISTULA SITE 30 MINUTES TO 1 HOUR PRIOR TO TREATMENT  
  
 metOLazone 5 mg tablet Commonly known as:  Forestine Blew Take 1 Tab by mouth daily. mupirocin 2 % ointment Commonly known as:  [facility] Apply  to affected area three (3) times daily. nitroglycerin 0.3 mg SL tablet Commonly known as:  NITROSTAT  
1 Tab by SubLINGual route every five (5) minutes as needed for Chest Pain. NovoLOG Mix 70-30 U-100 Insuln 100 unit/mL (70-30) injection Generic drug:  insulin aspart protamine/insulin aspart by SubCUTAneous route three (3) times daily as needed (for BG  > 140 mg/dL). Uses sliding scale  
  
 oxyCODONE-acetaminophen 7.5-325 mg per tablet Commonly known as:  PERCOCET Take 1 Tab by mouth two (2) times daily as needed for Pain. Max Daily Amount: 2 Tabs. pantoprazole 40 mg tablet Commonly known as:  PROTONIX  
TAKE 1 TABLET BY MOUTH EVERY DAY FOR HEARTBURN  
  
 predniSONE 50 mg tablet Commonly known as:  DELTASONE  
TAKE 1 TABLET BY MOUTH 13 HOURS,7 HOURS AND 1 HOUR PRIOR TO PROCEDURE  
  
 sevelamer carbonate 800 mg Tab tab Commonly known as:  Zuleyma Katy TAKE 2 TABLETS BY MOUTH WITH MEALS AND TAKE 1 TABLET BY MOUTH WITH SNACKS  
  
 VITAMIN D2 50,000 unit capsule Generic drug:  ergocalciferol Take 50,000 Units by mouth every Tuesday. warfarin 3 mg tablet Commonly known as:  COUMADIN  
3mg PO everyday * Notice: This list has 4 medication(s) that are the same as other medications prescribed for you. Read the directions carefully, and ask your doctor or other care provider to review them with you. Follow-up Instructions Return in about 3 months (around 12/21/2018). Patient Instructions Stop Hydralazine completely Increase Coreg to 1.5 tablets twice daily with food. Introducing [facility] SERVICES! Dear Angela Bowers: Thank you for requesting a [employer] account. Our records indicate that you already have an active [employer] account. You can access your account anytime at [email or web address] Did you know that you can access your hospital and ER discharge instructions at any time in [employer]? You can also review all of your test results from your hospital stay or ER visit. Additional Information If you have questions, please visit the Frequently Asked Questions section of the [employer] website at [email or web address] Remember, [employer] is NOT to be used for urgent needs.  For medical emergencies, dial 911. Now available from your iPhone and Android! Please provide this summary of care documentation to your next provider. Your primary care clinician is listed as Lili Odom. If you have any questions after today's visit, please call [phone]. DISPLAY PLAN FREE TEXT

## 2021-12-09 NOTE — ED ADULT NURSE NOTE - OBJECTIVE STATEMENT
89 yo male noted c/o Chest Pain. PT pointing to left rib cage area where the pain is at this time. PT denies any SOB/AJ/Dizziness at this time. PT speaking in clear complete sentences .

## 2021-12-09 NOTE — H&P ADULT - PROBLEM SELECTOR PLAN 5
F: s/p 1L NS   E: replete to K>4, Mg>2, Phos>2.5  N: DASH/TLC  Ppx: therapeutic lovenox    Code Status: FULL CODE    Dispo: [facility]

## 2021-12-09 NOTE — H&P ADULT - NSICDXPASTMEDICALHX_GEN_ALL_CORE_FT
[FreeTextEntry6] : 3yo F pmhx PDS s/p correction, strabismus s/p surgery and speech delay presents after ED visit for febrile seizure. Patient started having a fever Sunday night/Monday morning and one episode of NBNB emesis. Patient was brought to an UC, had a fever documented in the UC, and during the exam had a seizure. Patient's eyes fixed to the L and her whole body stiffened and froze. Episode lasted less than a minute. Patient was brought to the ED and was post-ictal for about 5 hrs. Patient had some diarrhea on the day of event. Denies cough, rhinorrhea, congestion, rash, pain, or sick contacts. Patient has had no fevers or symptoms since the event. Mom reports she has a hx of febrile seizures. PAST MEDICAL HISTORY:  BPH (benign prostatic hyperplasia)     Esophageal spasm     HTN (hypertension)     Hypothyroidism       BPH (benign prostatic hyperplasia)

## 2021-12-09 NOTE — H&P ADULT - NSHPLABSRESULTS_GEN_ALL_CORE
LABS:                          12.4   9.47  )-----------( 233      ( 09 Dec 2021 10:12 )             38.1     12-09    139  |  104  |  15  ----------------------------<  134<H>  3.9   |  27  |  1.06    Ca    9.3      09 Dec 2021 10:12    TPro  7.3  /  Alb  3.4  /  TBili  0.4  /  DBili  x   /  AST  12  /  ALT  11  /  AlkPhos  73  12-09

## 2021-12-09 NOTE — H&P ADULT - PROBLEM SELECTOR PLAN 1
Patient with acute onset L sided chest pain in evening. Found to have b/l PE incidentally on Cardiac CT. No immobilization, recent travel, history of DVT/PE, family history of clotting disorder. Unclear whether patient has had appropriate cancer screenings - no h/o colonoscopy.     - c/w therapeutic lovenox 65 mg q 12 hrs  - switch to eliquis in AM  - f/u b/l LE dopplers  - f/u ECHO

## 2021-12-09 NOTE — ED PROVIDER NOTE - CLINICAL SUMMARY MEDICAL DECISION MAKING FREE TEXT BOX
Impression: left sided chest pain since last night, worse when supine and improved upon sitting upright. Pt is afebrile and hemodynamically stable. EKG negative for ischemia, no signs of pericarditis. plan labs including trop, chest xray, dose of Labetalol for elevated blood pressure will continue to monitor. Impression: left sided chest pain since last night, worse when supine and improved upon sitting upright. Pt is afebrile and hemodynamically stable. EKG negative for ischemia, no signs of pericarditis. plan for labs including trop, chest xray, dose of Labetalol for elevated blood pressure will continue to monitor.    Labs reassuring with normal trop. CXR + for small left pleural effusion + atelectasis, prominent aorta. Given cp, htn, prominent aorta on cxr, pt arranged for cta c/a/p to r/o dissection, in addition to ccta. No evidence of dissection, and no severe cad noted though limited study to due calcifications and motion; however + b/l pe's. Test results d/w pt and wife. Case also d/w Dr. Callahan. Will start admit to medicine svc under Dr. Callahan and start on lovenox.

## 2021-12-09 NOTE — ED ADULT TRIAGE NOTE - CHIEF COMPLAINT QUOTE
Pt presents to ED c/o chest pain x 1 hour. 12 lead EKG done. Noted to be hypertensive, hx of HTN, took medications this morning as prescribed.

## 2021-12-09 NOTE — H&P ADULT - NSCORESITESY/N_GEN_A_CORE_RD
Cold symptoms onset overnight 3/27-28.  Seen in office 3/28, wass told URI.  Feels breathing is more labored.  Possible mild retractions to lower sides.  Increased crying.  No BM x 2 days.    Onset: 3/27  Location/description: Cough, congestion  Associated Symptoms: Mother perceives breathing more labored.  Possible retractions to sides.  What improves/worsens symptoms:   Symptom specific medications: None  Input and Output: Decreased PO intake 0715 40mL, 420 45 mL, 0100 60 mL.  Activity level: Activity level normal.    Temperature (route and time): No known fevers  Weight:     Wt Readings from Last 1 Encounters:   03/28/17 4.009 kg (<1 %, Z= -3.01)*     * Growth percentiles are based on WHO (Boys, 0-2 years) data.          Recent Care: Last OV with PCP 3/28/17    Reason for Disposition  • [1] Difficulty breathing AND [2] not severe AND [3] not relieved by cleaning out the nose    Protocols used: COLDS-P-AH       No

## 2021-12-09 NOTE — H&P ADULT - TIME BILLING
Patient seen and examined with house-staff during bedside rounds.  Resident note read, including vitals, physical findings, laboratory data, and radiological reports.   Revisions included below.  Direct personal management at bed side and extensive interpretation of the data.  Plan was outlined and discussed in details with the housestaff.  Decision making of high complexity  Action taken for acute disease activity to reflect the level of care provided:  - medication reconciliation  - review laboratory data  Patient is admitted with subsegmental pulmonary emboli with no evidence of right ventricular strain.  The venous Doppler was negative.  Seems to episodes is unprovoked.  Start Lovenox.  Follow-up on echocardiogram.  Increase activity.  He will require thrombophilia and malignancy work-up as an outpatient.

## 2021-12-09 NOTE — ED PROVIDER NOTE - PHYSICAL EXAMINATION
VITAL SIGNS: I have reviewed nursing notes and confirm.  CONSTITUTIONAL: In no acute distress.   SKIN:  warm and dry, no acute rash.   HEAD:  normocephalic, atraumatic.  EYES: EOM intact; conjunctiva and sclera clear.  ENT: No nasal discharge; airway clear.   NECK: Supple; non tender.  CARD: Healed biopsy sight to the left lateral chest wall, no surrounding erythema, swelling, or tenderness. Mild tenderness to palpation to the left lower lateral chest wall, no crepitus. S1, S2 normal; no murmurs, gallops, or rubs. Regular rate and rhythm.   RESP:  Clear to auscultation b/l, no wheezes, rales or rhonchi.  ABD: Normal bowel sounds; soft; non-distended; non-tender; no guarding/ rebound.  EXT: Normal ROM. No clubbing, cyanosis or peripheral edema. 2+ pulses to b/l ue/le. no calf tenderness or cords,   NEURO: Alert, oriented, grossly unremarkable  PSYCH: Cooperative, mood and affect appropriate. VITAL SIGNS: I have reviewed nursing notes and confirm.  CONSTITUTIONAL: In no acute distress.   SKIN:  warm and dry, no acute rash.   HEAD:  normocephalic, atraumatic.  EYES: EOM intact; conjunctiva and sclera clear.  ENT: No nasal discharge; airway clear.   NECK: Supple; non tender.  CARD: Scab to the left lateral chest wall, no surrounding erythema, swelling, or tenderness. Mild tenderness to palpation to the left lower lateral chest wall, no crepitus. S1, S2 normal; reg rate and rhythm.   RESP:  Clear to auscultation b/l, no wheezes, rales or rhonchi.  ABD: Normal bowel sounds; soft; non-distended; non-tender; no guarding/ rebound.  EXT: Normal ROM. No clubbing, cyanosis or peripheral edema. 2+ pulses to b/l ue/le. no calf tenderness or cords,   NEURO: Alert, oriented, grossly unremarkable  PSYCH: Cooperative, mood and affect appropriate.

## 2021-12-09 NOTE — ED PROVIDER NOTE - NSICDXPASTMEDICALHX_GEN_ALL_CORE_FT
PAST MEDICAL HISTORY:  Esophageal spasm     HTN (hypertension)     Hypothyroidism       BPH (benign prostatic hyperplasia)

## 2021-12-09 NOTE — H&P ADULT - ASSESSMENT
89 y/o M PMHx HTN, hypothyroidism, esophageal spasms, BPH. Presents c/o chest pain since last night, found to have b/l PE  89 y/o M PMHx HTN, hypothyroidism, esophageal spasms, BPH. Presents c/o chest pain since last night, found to have b/l pulmonary embolism on CTA

## 2021-12-09 NOTE — H&P ADULT - NSHPPHYSICALEXAM_GEN_ALL_CORE
Vital Signs Last 24 Hrs  T(C): 36.6 (09 Dec 2021 09:13), Max: 36.6 (09 Dec 2021 09:13)  T(F): 97.9 (09 Dec 2021 09:13), Max: 97.9 (09 Dec 2021 09:13)  HR: 72 (09 Dec 2021 12:30) (70 - 94)  BP: 157/74 (09 Dec 2021 12:30) (157/74 - 204/85)  RR: 17 (09 Dec 2021 12:30) (17 - 18)  SpO2: 97% (09 Dec 2021 12:30) (96% - 98%)    VITALS:     GENERAL: NAD, lying in bed comfortably  HEAD:  Atraumatic, Normocephalic  EYES: EOMI, PERRLA, conjunctiva and sclera clear  ENT: Moist mucous membranes  NECK: Supple, No JVD  CHEST/LUNG: Clear to auscultation bilaterally; No rales, rhonchi, wheezing, or rubs. Unlabored respirations  HEART: Regular rate and rhythm; No murmurs, rubs, or gallops  ABDOMEN: Bowel sounds present; Soft, Nontender, Nondistended. No hepatomegally  EXTREMITIES:  2+ Peripheral Pulses, brisk capillary refill. No clubbing, cyanosis, or edema  NERVOUS SYSTEM:  Alert & Oriented X3, speech clear. No deficits   MSK: FROM all 4 extremities, full and equal strength  SKIN: No rashes or lesions Vital Signs Last 24 Hrs  T(C): 36.6 (09 Dec 2021 09:13), Max: 36.6 (09 Dec 2021 09:13)  T(F): 97.9 (09 Dec 2021 09:13), Max: 97.9 (09 Dec 2021 09:13)  HR: 72 (09 Dec 2021 12:30) (70 - 94)  BP: 157/74 (09 Dec 2021 12:30) (157/74 - 204/85)  RR: 17 (09 Dec 2021 12:30) (17 - 18)  SpO2: 97% (09 Dec 2021 12:30) (96% - 98%)    GENERAL: NAD, lying in bed comfortably  HEAD:  Atraumatic, Normocephalic  EYES: EOMI, PERRLA, conjunctiva and sclera clear  ENT: Moist mucous membranes  CHEST/LUNG: Clear to auscultation bilaterally; No rales, rhonchi, wheezing, or rubs. Unlabored respirations.  HEART: Regular rate and rhythm; No murmurs, rubs, or gallops  ABDOMEN: Bowel sounds present; Soft, Nontender, Nondistended. No hepatomegally  EXTREMITIES:  2+ Peripheral Pulses. No clubbing, cyanosis, or edema.  NERVOUS SYSTEM:  Alert & Oriented X3, speech clear. No focal deficits.   MSK: FROM all 4 extremities, full and equal strength  SKIN: No rashes or lesions

## 2021-12-09 NOTE — ED PROVIDER NOTE - OBJECTIVE STATEMENT
89 y/o M PMHx HTN, hypothyroidism, esophageal spasms, BPH. Presents c/o chest pain since last night. describes pain as a constant, dull ache to the left side of the chest that is better when sitting upright and worse when lying supine. Wife checked blood pressure and noted Pt to be elevated. Denies radiation of pain, SOB, palpitations, dizziness, nausea, diaphoresis, or syncope. Pt had a biopsy to the left chest wall a few days ago without complications. No fevers, chills or URI symptoms. No personal or family h/o CAD or thromboembolism. 89 y/o M PMHx HTN, hypothyroidism, esophageal spasms, BPH. Presents c/o chest pain since last night. describes pain as a constant, dull ache to the left side of the chest that is better when sitting upright and worse when lying supine. Wife checked blood pressure and noted it to be elevated. Denies radiation of pain, SOB, palpitations, dizziness, nausea, diaphoresis, or syncope. Pt had a biopsy to the left chest wall a few days ago without complications. No fevers, chills or URI symptoms. No personal or family h/o CAD or thromboembolism.

## 2021-12-10 ENCOUNTER — TRANSCRIPTION ENCOUNTER (OUTPATIENT)
Age: 86
End: 2021-12-10

## 2021-12-10 DIAGNOSIS — R53.1 WEAKNESS: ICD-10-CM

## 2021-12-10 LAB
ANION GAP SERPL CALC-SCNC: 6 MMOL/L — SIGNIFICANT CHANGE UP (ref 5–17)
APTT BLD: 40.9 SEC — HIGH (ref 27.5–35.5)
BUN SERPL-MCNC: 13 MG/DL — SIGNIFICANT CHANGE UP (ref 7–23)
CALCIUM SERPL-MCNC: 9.1 MG/DL — SIGNIFICANT CHANGE UP (ref 8.4–10.5)
CHLORIDE SERPL-SCNC: 108 MMOL/L — SIGNIFICANT CHANGE UP (ref 96–108)
CO2 SERPL-SCNC: 29 MMOL/L — SIGNIFICANT CHANGE UP (ref 22–31)
CREAT SERPL-MCNC: 1.13 MG/DL — SIGNIFICANT CHANGE UP (ref 0.5–1.3)
GLUCOSE SERPL-MCNC: 95 MG/DL — SIGNIFICANT CHANGE UP (ref 70–99)
HCT VFR BLD CALC: 37.4 % — LOW (ref 39–50)
HGB BLD-MCNC: 11.7 G/DL — LOW (ref 13–17)
INR BLD: 1.13 — SIGNIFICANT CHANGE UP (ref 0.88–1.16)
MAGNESIUM SERPL-MCNC: 2 MG/DL — SIGNIFICANT CHANGE UP (ref 1.6–2.6)
MCHC RBC-ENTMCNC: 28.6 PG — SIGNIFICANT CHANGE UP (ref 27–34)
MCHC RBC-ENTMCNC: 31.3 GM/DL — LOW (ref 32–36)
MCV RBC AUTO: 91.4 FL — SIGNIFICANT CHANGE UP (ref 80–100)
NRBC # BLD: 0 /100 WBCS — SIGNIFICANT CHANGE UP (ref 0–0)
PHOSPHATE SERPL-MCNC: 3.5 MG/DL — SIGNIFICANT CHANGE UP (ref 2.5–4.5)
PLATELET # BLD AUTO: 221 K/UL — SIGNIFICANT CHANGE UP (ref 150–400)
POTASSIUM SERPL-MCNC: 3.9 MMOL/L — SIGNIFICANT CHANGE UP (ref 3.5–5.3)
POTASSIUM SERPL-SCNC: 3.9 MMOL/L — SIGNIFICANT CHANGE UP (ref 3.5–5.3)
PROTHROM AB SERPL-ACNC: 13.5 SEC — SIGNIFICANT CHANGE UP (ref 10.6–13.6)
RBC # BLD: 4.09 M/UL — LOW (ref 4.2–5.8)
RBC # FLD: 13.2 % — SIGNIFICANT CHANGE UP (ref 10.3–14.5)
SODIUM SERPL-SCNC: 143 MMOL/L — SIGNIFICANT CHANGE UP (ref 135–145)
WBC # BLD: 7.92 K/UL — SIGNIFICANT CHANGE UP (ref 3.8–10.5)
WBC # FLD AUTO: 7.92 K/UL — SIGNIFICANT CHANGE UP (ref 3.8–10.5)

## 2021-12-10 PROCEDURE — 93306 TTE W/DOPPLER COMPLETE: CPT | Mod: 26

## 2021-12-10 PROCEDURE — 99232 SBSQ HOSP IP/OBS MODERATE 35: CPT | Mod: GC

## 2021-12-10 RX ORDER — INFLUENZA VIRUS VACCINE 15; 15; 15; 15 UG/.5ML; UG/.5ML; UG/.5ML; UG/.5ML
0.7 SUSPENSION INTRAMUSCULAR ONCE
Refills: 0 | Status: DISCONTINUED | OUTPATIENT
Start: 2021-12-10 | End: 2021-12-11

## 2021-12-10 RX ORDER — APIXABAN 2.5 MG/1
10 TABLET, FILM COATED ORAL EVERY 12 HOURS
Refills: 0 | Status: DISCONTINUED | OUTPATIENT
Start: 2021-12-10 | End: 2021-12-11

## 2021-12-10 RX ORDER — ENOXAPARIN SODIUM 100 MG/ML
65 INJECTION SUBCUTANEOUS
Refills: 0 | Status: DISCONTINUED | OUTPATIENT
Start: 2021-12-10 | End: 2021-12-10

## 2021-12-10 RX ORDER — TAMSULOSIN HYDROCHLORIDE 0.4 MG/1
0.4 CAPSULE ORAL DAILY
Refills: 0 | Status: DISCONTINUED | OUTPATIENT
Start: 2021-12-10 | End: 2021-12-11

## 2021-12-10 RX ORDER — APIXABAN 2.5 MG/1
1 TABLET, FILM COATED ORAL
Qty: 70 | Refills: 5
Start: 2021-12-10 | End: 2022-06-07

## 2021-12-10 RX ADMIN — FINASTERIDE 5 MILLIGRAM(S): 5 TABLET, FILM COATED ORAL at 11:44

## 2021-12-10 RX ADMIN — APIXABAN 10 MILLIGRAM(S): 2.5 TABLET, FILM COATED ORAL at 17:49

## 2021-12-10 RX ADMIN — TAMSULOSIN HYDROCHLORIDE 0.4 MILLIGRAM(S): 0.4 CAPSULE ORAL at 19:16

## 2021-12-10 RX ADMIN — SODIUM CHLORIDE 100 MILLILITER(S): 9 INJECTION INTRAMUSCULAR; INTRAVENOUS; SUBCUTANEOUS at 02:30

## 2021-12-10 RX ADMIN — LISINOPRIL 10 MILLIGRAM(S): 2.5 TABLET ORAL at 06:19

## 2021-12-10 RX ADMIN — ENOXAPARIN SODIUM 65 MILLIGRAM(S): 100 INJECTION SUBCUTANEOUS at 06:18

## 2021-12-10 NOTE — PATIENT PROFILE ADULT - FALL HARM RISK - FALL HARM RISK
Benefits, risks, and possible complications of procedure explained to patient/caregiver who verbalized understanding and gave verbal consent. Age/Coagulation

## 2021-12-10 NOTE — PHYSICAL THERAPY INITIAL EVALUATION ADULT - PERTINENT HX OF CURRENT PROBLEM, REHAB EVAL
89 y/o M PMHx HTN, hypothyroidism, peripheral neuropathy (onset 3 years ago after spinal surgery), esophageal spasms, BPH presenting with chest pain since last night. Pt had acute onset L sided pain in the chest that alleviated when sitting up and worsened when lying down. Found to have b/l pulmonary embolism on CTA
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
## 2021-12-10 NOTE — PHYSICAL THERAPY INITIAL EVALUATION ADULT - DISCHARGE DISPOSITION, PT EVAL
home w/ home PT Home with HPT and family supervision. Pt can benefit from OP PT, however pt's wife insist on HPT transition to OP PT as pt does not have much confidence with walking outside. Pt is fall risk and therefore will benefit from HPT./home w/ home PT Home with HPT and family supervision. Pt can benefit from OP PT, however pt's wife insist on HPT transition to OP PT as pt does not have much confidence with walking outside and is concerned for COVID exposure. Pt is fall risk and therefore will benefit from HPT.

## 2021-12-10 NOTE — DISCHARGE NOTE PROVIDER - NSDCCPCAREPLAN_GEN_ALL_CORE_FT
PRINCIPAL DISCHARGE DIAGNOSIS  Diagnosis: Pulmonary embolism  Assessment and Plan of Treatment: Pulmonary embolism (or "PE") is a blockage in one or more of the blood vessels that supply blood to the lungs. Most often these blockages are caused by blood clots that form elsewhere and then travel to the lungs. In rare cases, blockages can also be caused by air bubbles, tiny globs of fat, or pieces of tumor that travel to the lungs. If a blood clot forms or gets stuck inside a blood vessel, it can clog the vessel and keep blood from getting where it needs to go. When that happens in the lungs, the lungs can get damaged. Having blocked arteries in the lung can also make it hard to breathe and can even lead to death. Common symptoms include panting, shortness of breath, or trouble breathing, sharp, knife-like chest pain when you breathe in or strain, coughing or coughing up blood or simply a rapid heartbeat. If you get any of these symptoms, especially if they happen over a short period of time (hours or days) or get worse quickly, call for an ambulance. At the hospital, doctors can run tests to find out if you do have a clot. Blood clots in the lungs can lead to death. That's why it's important to act fast and find out if there is a clot.  Please follow up with your primary care provider.  Take your 2 pills of Eliquis 2 times a day for the next 5 days. Then afterwards take 1 pill of Eliquis 2 times a day for the following 6 months.        SECONDARY DISCHARGE DIAGNOSES  Diagnosis: Chest pain  Assessment and Plan of Treatment:

## 2021-12-10 NOTE — PHYSICAL THERAPY INITIAL EVALUATION ADULT - REFERRAL TO ANOTHER SERVICE NEEDED, PT EVAL
MD Brenner notified/occupational therapy MD Brenner notified; see comments/neurology/occupational therapy

## 2021-12-10 NOTE — DISCHARGE NOTE PROVIDER - CARE PROVIDER_API CALL
Daniela Callahan)  Critical Care Medicine; Pulmonary Disease  [address] [place] [address]  Phone: [phone]  Fax: [phone]  Established Patient  Follow Up Time: 1 week

## 2021-12-10 NOTE — PHYSICAL THERAPY INITIAL EVALUATION ADULT - MODALITIES TREATMENT COMMENTS
PT fitted standard cane for pt; pt's wife notes she would like the cane to be higher based on new research. Pt cane is fitted to hip height and pt reports its a comfortable fit. Pt can benefit from using cane as his assistive device during gait to decrease the risk of falls. Evaluation limited by pt's wife answering questions for pt's impairments. PT fitted standard cane for pt; pt's wife notes she would like the cane to be higher based on new research. Pt cane is fitted to hip height and pt reports its a comfortable fit. Pt can benefit from using cane as his assistive device during gait to decrease the risk of falls. Evaluation limited by pt's wife answering questions for pt's impairments. PT fitted standard cane for pt; pt's wife notes she would like the cane to be higher based on new research. Pt cane is fitted to hip height and pt reports its a comfortable fit. Pt can benefit from using cane as his assistive device during gait to decrease the risk of falls. Pt can also benefit from OP Neurologist consultation for trouble with initiation of gait, balance disturbances and intention tremors; MD Brenner notified. Evaluation limited by pt's wife answering questions for pt's impairments. PT fitted standard cane for pt; pt's wife notes she would like the cane to be higher based on new research. Pt cane is fitted to hip height and pt reports its a comfortable fit. Pt can benefit from using cane as his assistive device during gait to decrease the risk of falls. Pt can also benefit from Neurologist consultation for trouble with initiation of gait, balance disturbances and intention tremors; MD Brenner notified.

## 2021-12-10 NOTE — PROGRESS NOTE ADULT - PROBLEM SELECTOR PLAN 3
Patient with history of HTN on benazepril 10 mg q daily.     - c/w benazepril 10 mg q daily. Patient with history of HTN on benazepril 10 mg q daily.     - lisinopril 10 mg q daily.

## 2021-12-10 NOTE — PROGRESS NOTE ADULT - PROBLEM SELECTOR PLAN 1
Patient with acute onset L sided chest pain in evening. Found to have b/l PE incidentally on Cardiac CT. No immobilization, recent travel, history of DVT/PE, family history of clotting disorder. Unclear whether patient has had appropriate cancer screenings - no h/o colonoscopy.     - c/w therapeutic lovenox 65 mg q 12 hrs  - switch to eliquis in AM  - f/u b/l LE dopplers  - f/u ECHO Patient with acute onset L sided chest pain in evening. Found to have b/l PE incidentally on Cardiac CT. No immobilization, recent travel, history of DVT/PE, family history of clotting disorder. Unclear whether patient has had appropriate cancer screenings - no h/o colonoscopy.   b/l LE dopplers -> no dvt    - dc'd therapeutic lovenox 65 mg   - started Eliquis therapeutic dosing to start tonight at 6pm  - f/u ECHO

## 2021-12-10 NOTE — PHYSICAL THERAPY INITIAL EVALUATION ADULT - ADDITIONAL COMMENTS
Pt and his wife reports he lives in an apartment mostly, with 2 DODIE and elevator access. Pt uses a standard cane at night to walk to bathroom. He has rails near his toilet and in the shower. Pt reports his wife positions herself to guard him when taking the stairs. Pt's wife reports pt was very active before this admission but he is homebound because he is not confident enough. Otherwise pt uses an exercise bike/apartment gym to stay active. Pt's wife reporting he recently began receiving home PT privately but would like to try another therapist due to increased cost.

## 2021-12-10 NOTE — PHYSICAL THERAPY INITIAL EVALUATION ADULT - TRANSFER SAFETY CONCERNS NOTED: SIT/STAND, REHAB EVAL
Pt unsteady with sit to stand, no LOB noted./decreased weight-shifting ability Pt unsteady with sit to stand, no LOB noted. Also performed toilet transfer with SBA and use of guard rails./decreased weight-shifting ability

## 2021-12-10 NOTE — PROGRESS NOTE ADULT - TIME BILLING
Patient seen and examined with house-staff during bedside rounds.  Resident note read, including vitals, physical findings, laboratory data, and radiological reports.   Revisions included below.  Direct personal management at bed side and extensive interpretation of the data.  Plan was outlined and discussed in details with the housestaff.  Decision making of high complexity  Action taken for acute disease activity to reflect the level of care provided:  - medication reconciliation  - review laboratory data    .  The patient is clinically stable.  The patient is out of bed in chair.  Adequate oxygenation on room air.  The venous Doppler was negative.  The echocardiogram did not reveal any pulmonary hypertension right ventricular strain.  The patient is clinically stable and he is on apixaban which tolerated well.  The patient was evaluated by physical therapy and the sickly recommendation is home PT.  The patient is stable increase activity.  Evaluate for physical therapy tomorrow and possible discharge home

## 2021-12-10 NOTE — PROGRESS NOTE ADULT - SUBJECTIVE AND OBJECTIVE BOX
O/N Events: bladder scans revealed > 600cc retention. patient refused straight cath. lisinopril added to regimen    Subjective/ROS: Patient seen and examined at bedside. patient complained about overflow incontinence    Denies Fever/Chills, HA, CP, SOB, n/v, changes in bowel/urinary habits.  12pt ROS otherwise negative.    VITALS  Vital Signs Last 24 Hrs  T(C): 36.6 (10 Dec 2021 05:53), Max: 37.2 (09 Dec 2021 21:23)  T(F): 97.8 (10 Dec 2021 05:53), Max: 98.9 (09 Dec 2021 21:23)  HR: 57 (10 Dec 2021 05:53) (57 - 94)  BP: 151/64 (10 Dec 2021 05:53) (151/64 - 204/85)  BP(mean): --  RR: 18 (10 Dec 2021 05:53) (16 - 18)  SpO2: 97% (10 Dec 2021 05:53) (95% - 98%)    CAPILLARY BLOOD GLUCOSE          PHYSICAL EXAM  General: NAD, weakness  Head: NC/AT; MMM; PERRL; EOMI;  Neck: Supple; no JVD  Respiratory: CTAB; no wheezes/rales/rhonchi  Cardiovascular: Regular rhythm/rate; S1/S2+, no murmurs, rubs gallops   Gastrointestinal: Soft; NTND; bowel sounds normal and present  Extremities: WWP; no edema/cyanosis  Neurological: A&Ox3, CNII-XII grossly intact; no obvious focal deficits    MEDICATIONS  (STANDING):  enoxaparin Injectable 65 milliGRAM(s) SubCutaneous <User Schedule>  finasteride 5 milliGRAM(s) Oral daily  influenza  Vaccine (HIGH DOSE) 0.7 milliLiter(s) IntraMuscular once  lisinopril 10 milliGRAM(s) Oral daily  sodium chloride 0.9%. 1000 milliLiter(s) (100 mL/Hr) IV Continuous <Continuous>    MEDICATIONS  (PRN):      penicillin (Rash)      LABS                        12.4   9.47  )-----------( 233      ( 09 Dec 2021 10:12 )             38.1     12-09    139  |  104  |  15  ----------------------------<  134<H>  3.9   |  27  |  1.06    Ca    9.3      09 Dec 2021 10:12    TPro  7.3  /  Alb  3.4  /  TBili  0.4  /  DBili  x   /  AST  12  /  ALT  11  /  AlkPhos  73  12-09    PT/INR - ( 09 Dec 2021 15:22 )   PT: 12.8 sec;   INR: 1.07          PTT - ( 09 Dec 2021 15:22 )  PTT:31.8 sec    CARDIAC MARKERS ( 09 Dec 2021 10:12 )  x     / 0.01 ng/mL / x     / x     / x              IMAGING/EKG/ETC

## 2021-12-10 NOTE — PHYSICAL THERAPY INITIAL EVALUATION ADULT - GENERAL OBSERVATIONS, REHAB EVAL
PT IE Completed. Pt recieved seated in bedside chair, A&Ox3, +RA, wife at bedside, in NAD, agreeable to work with PT, DAVID Melendez notified.Pt left as found, +bed alarm, +call gotti within reach, DAVDI Melendez notified. Pt can benefit from PT in order to improve quality of life by increasing strength/endurance/balance with functional mobility and ADLS.

## 2021-12-10 NOTE — PHYSICAL THERAPY INITIAL EVALUATION ADULT - GAIT PATTERN USED, PT EVAL
Pt ambulates with moderately unsteady gait initially, improving with increased distance. No LOB noted but ambulates with wide RITA and decreased L arm swing. Quick piper, somewhat shuffling./2-point gait

## 2021-12-10 NOTE — PATIENT PROFILE ADULT - FALL HARM RISK - HARM RISK INTERVENTIONS

## 2021-12-10 NOTE — DISCHARGE NOTE PROVIDER - HOSPITAL COURSE
89 y/o M PMHx HTN, hypothyroidism, esophageal spasms, BPH. Presents c/o chest pain since last night, found to have b/l pulmonary embolism on CTA     #Pulmonary embolism.   ·  Plan: Patient with acute onset L sided chest pain in evening. Found to have b/l PE incidentally on Cardiac CT. No immobilization, recent travel, history of DVT/PE, family history of clotting disorder. Unclear whether patient has had appropriate cancer screenings - no h/o colonoscopy.   b/l LE dopplers -> no dvt    - started Eliquis 10mg bid therapeutic on 12/9      #BPH (benign prostatic hyperplasia).   ·  Plan: Patient with history of BPH on home finasteride 5 mg and silodosin 4 mg.   - c/w home finasteride 5 mg  - tamsulosin 0.4 daily    #HTN (hypertension).   ·  Plan: Patient with history of HTN on benazepril 10 mg q daily.   - lisinopril 10 mg q daily.    #Esophageal spasm.   ·  Plan: Patient with history of esophageal spasm causing food impaction multiple years ago. No active complaints.  Monitored       Problem/Plan - 5:  ·  Problem: Weakness.   ·  Plan: patient unable to turn himself or lift himself to complete physical exam without help from provider.  -PT recommends home with home PT    New Meds: Eliquis  Labs to follow: CBC  Exams to follow: extremities, Pulm    Physical Exam at discharge:  General: NAD, weakness  Head: NC/AT; MMM; PERRL; EOMI;  Neck: Supple; no JVD  Respiratory: CTAB; no wheezes/rales/rhonchi  Cardiovascular: Regular rhythm/rate; S1/S2+, no murmurs, rubs gallops   Gastrointestinal: Soft; NTND; bowel sounds normal and present  Extremities: WWP; no edema/cyanosis  Neurological: A&Ox3, CNII-XII grossly intact; no obvious focal deficits 89 y/o M PMHx HTN, hypothyroidism, esophageal spasms, BPH. Presents c/o chest pain since last night, found to have b/l pulmonary embolism on CTA     #Pulmonary embolism.   ·  Plan: Patient with acute onset L sided chest pain in evening. Found to have b/l PE incidentally on Cardiac CT. No recent travel, history of DVT/PE, family history of clotting disorder. Unclear whether patient has had appropriate cancer screenings - no h/o colonoscopy.   b/l LE dopplers -> no dvt  PE low risk --- no tachycardia, TTE normal  -per wife, patient was recently less mobile due to dizziness associated with antibiotic taken for UTI -- may be considered triggering factor however still needs outpatient hypercoagulable workup     - started Eliquis 10mg bid therapeutic on 12/9      #BPH (benign prostatic hyperplasia).   ·  Plan: Patient with history of BPH on home finasteride 5 mg and silodosin 4 mg.   - c/w home finasteride 5 mg  - tamsulosin 0.4 daily    #HTN (hypertension).   ·  Plan: Patient with history of HTN on benazepril 10 mg q daily.   - lisinopril 10 mg q daily.    #Esophageal spasm.   ·  Plan: Patient with history of esophageal spasm causing food impaction multiple years ago. No active complaints.  Monitored       Problem/Plan - 5:  ·  Problem: Weakness.   ·  Plan: patient unable to turn himself or lift himself to complete physical exam without help from provider.  -PT recommends home with home PT    New Meds: Eliquis  Labs to follow: CBC  Exams to follow: extremities, Pulm    Physical Exam at discharge:  General: NAD, weakness  Head: NC/AT; MMM; PERRL; EOMI;  Neck: Supple; no JVD  Respiratory: CTAB; no wheezes/rales/rhonchi  Cardiovascular: Regular rhythm/rate; S1/S2+, no murmurs, rubs gallops   Gastrointestinal: Soft; NTND; bowel sounds normal and present  Extremities: WWP; no edema/cyanosis  Neurological: A&Ox3, CNII-XII grossly intact; no obvious focal deficits

## 2021-12-10 NOTE — DISCHARGE NOTE PROVIDER - NSDCMRMEDTOKEN_GEN_ALL_CORE_FT
benazepril 10 mg oral tablet: 1 tab(s) orally once a day  finasteride 5 mg oral tablet: 1 tab(s) orally once a day  silodosin 4 mg oral capsule: 1 cap(s) orally once a day   apixaban 5 mg oral tablet: 2 tab(s) orally every 12 hours for 5 days.  Then take 1 tabe orally every 12 hours for the next 6months.   benazepril 10 mg oral tablet: 1 tab(s) orally once a day  finasteride 5 mg oral tablet: 1 tab(s) orally once a day  silodosin 4 mg oral capsule: 1 cap(s) orally once a day   apixaban 5 mg oral tablet: 2 tab(s) orally every 12 hours for 5 days.  Then take 1 tabe orally every 12 hours for the next 6months.   benazepril 10 mg oral tablet: 1 tab(s) orally once a day  Eliquis Starter Pack for Treatment of DVT and PE 5 mg oral tablet: 1 tab(s) orally 2 times a day   finasteride 5 mg oral tablet: 1 tab(s) orally once a day  silodosin 4 mg oral capsule: 1 cap(s) orally once a day

## 2021-12-11 ENCOUNTER — TRANSCRIPTION ENCOUNTER (OUTPATIENT)
Age: 86
End: 2021-12-11

## 2021-12-11 VITALS
SYSTOLIC BLOOD PRESSURE: 185 MMHG | TEMPERATURE: 97 F | HEART RATE: 84 BPM | RESPIRATION RATE: 18 BRPM | OXYGEN SATURATION: 94 % | DIASTOLIC BLOOD PRESSURE: 75 MMHG

## 2021-12-11 PROCEDURE — 99232 SBSQ HOSP IP/OBS MODERATE 35: CPT

## 2021-12-11 RX ORDER — APIXABAN 2.5 MG/1
1 TABLET, FILM COATED ORAL
Qty: 74 | Refills: 0
Start: 2021-12-11

## 2021-12-11 RX ADMIN — LISINOPRIL 10 MILLIGRAM(S): 2.5 TABLET ORAL at 05:53

## 2021-12-11 RX ADMIN — APIXABAN 10 MILLIGRAM(S): 2.5 TABLET, FILM COATED ORAL at 05:53

## 2021-12-11 NOTE — PROGRESS NOTE ADULT - PROBLEM SELECTOR PLAN 4
Patient with history of esophageal spasm causing food impaction multiple years ago. No active complaints.    - continue to monitor
Patient with history of esophageal spasm causing food impaction multiple years ago. No active complaints.    - continue to monitor

## 2021-12-11 NOTE — OCCUPATIONAL THERAPY INITIAL EVALUATION ADULT - LEVEL OF INDEPENDENCE: GROOMING, OT EVAL
Pt completed brushing teeth, brushing hair, and washing face while standing at sink. Pt required Min A for manipulation of toothpaste./contact guard

## 2021-12-11 NOTE — PROGRESS NOTE ADULT - PROBLEM SELECTOR PLAN 6
F: s/p 1L NS   E: replete to K>4, Mg>2, Phos>2.5  N: DASH/TLC  Ppx: therapeutic eliquis    Code Status: FULL CODE    Dispo: [facility]
F: s/p 1L NS   E: replete to K>4, Mg>2, Phos>2.5  N: DASH/TLC  Ppx: therapeutic eliquis    Code Status: FULL CODE    Dispo: [facility]

## 2021-12-11 NOTE — PROGRESS NOTE ADULT - PROBLEM SELECTOR PLAN 1
Patient with acute onset L sided chest pain in evening. Found to have b/l PE incidentally on Cardiac CT. No immobilization, recent travel, history of DVT/PE, family history of clotting disorder. Unclear whether patient has had appropriate cancer screenings - no h/o colonoscopy.   b/l LE dopplers -> no dvt    - dc'd therapeutic lovenox 65 mg   - started Eliquis therapeutic dosing to start tonight at 6pm  - f/u ECHO

## 2021-12-11 NOTE — OCCUPATIONAL THERAPY INITIAL EVALUATION ADULT - FINE MOTOR COORDINATION, LEFT HAND, MANIPULATION OF OBJECTS, OT EVAL
pt required assistance to manipulate toothpaste (open/close) d/t B hand weakness, tremulous movements (L>R)/mild impairment

## 2021-12-11 NOTE — OCCUPATIONAL THERAPY INITIAL EVALUATION ADULT - ADDITIONAL COMMENTS
Per chart review, Pt and his wife reports he lives in an apartment mostly, with 2 DODIE and elevator access. Pt uses a standard cane at night to walk to bathroom. He has rails near his toilet and in the shower. Pt reports his wife positions herself to guard him when taking the stairs. Pt's wife reports pt was very active before this admission but he is homebound because he is not confident enough. Otherwise pt uses an exercise bike/apartment gym to stay active. Pt's wife reporting he recently began receiving home PT privately but would like to try another therapist due to increased cost. Pt and his wife reports he lives in an apartment mostly, with 2 DODIE and elevator access. Pt uses a standard cane at night to walk to bathroom. He has rails near his toilet and in the shower. Pt reports his wife positions herself to guard him when taking the stairs. Per chart review, "Pt's wife reports pt was very active before this admission but he is homebound because he is not confident enough. Otherwise pt uses an exercise bike/apartment gym to stay active. Pt's wife reporting he recently began receiving home PT privately but would like to try another therapist due to increased cost."

## 2021-12-11 NOTE — DISCHARGE NOTE NURSING/CASE MANAGEMENT/SOCIAL WORK - PATIENT PORTAL LINK FT
You can access the FollowMyHealth Patient Portal offered by [facility] by registering at the following website: [email or web address]. By joining [facility]’s FollowMyHealth portal, you will also be able to view your health information using other applications (apps) compatible with our system.

## 2021-12-11 NOTE — OCCUPATIONAL THERAPY INITIAL EVALUATION ADULT - MODALITIES TREATMENT COMMENTS
Pt completed functional mobility in the hallway with 1 LOB noted. Noted frequent pauses during mobility and ~5 second hesitation when initiating functional mobility. Pt states he only uses his SPC when ambulating long distances and does not feel it improves his balance despite occupational therapist education.

## 2021-12-11 NOTE — PROGRESS NOTE ADULT - PROBLEM SELECTOR PLAN 2
Patient with history of BPH on home finasteride 5 mg and silodosin 4 mg.     - c/w home finasteride 5 mg  - f/u with pharmacy about appropriate tamsulosin conversion dose, then c/w tamsulosin while inpatient.
Patient with history of BPH on home finasteride 5 mg and silodosin 4 mg.     - c/w home finasteride 5 mg  - f/u with pharmacy about appropriate tamsulosin conversion dose, then c/w tamsulosin while inpatient.

## 2021-12-11 NOTE — OCCUPATIONAL THERAPY INITIAL EVALUATION ADULT - GENERAL OBSERVATIONS, REHAB EVAL
Pt received semi-supine, A&Ox4, +heplock, in NAD and agreeable to work with OT Pt received semi-supine, A&Ox4, +heplock, in NAD and agreeable to work with OT.

## 2021-12-11 NOTE — PROGRESS NOTE ADULT - SUBJECTIVE AND OBJECTIVE BOX
Interval Events: Reviewed  Patient seen and examined at bedside.    Patient is a 89y old  Male who presents with a chief complaint of P/E (10 Dec 2021 17:31)  no acute event overnight      PAST MEDICAL & SURGICAL HISTORY:  Hypothyroidism    HTN (hypertension)    Esophageal spasm    BPH (benign prostatic hyperplasia)    BPH (benign prostatic hyperplasia)    No significant past surgical history        MEDICATIONS:  Pulmonary:    Antimicrobials:    Anticoagulants:  apixaban 10 milliGRAM(s) Oral every 12 hours    Cardiac:  lisinopril 10 milliGRAM(s) Oral daily  tamsulosin 0.4 milliGRAM(s) Oral daily      Allergies    penicillin (Rash)    Intolerances        Vital Signs Last 24 Hrs  T(C): 36.3 (11 Dec 2021 05:47), Max: 36.7 (10 Dec 2021 20:39)  T(F): 97.3 (11 Dec 2021 05:47), Max: 98.1 (10 Dec 2021 20:39)  HR: 84 (11 Dec 2021 05:47) (81 - 91)  BP: 185/75 (11 Dec 2021 05:47) (151/77 - 185/75)  BP(mean): --  RR: 18 (11 Dec 2021 05:47) (18 - 18)  SpO2: 94% (11 Dec 2021 05:47) (94% - 98%)        Review of Systems:   •	General: negative  •	Skin/Breast: negative  •	Ophthalmologic: negative  •	ENMT: negative  •	Respiratory and Thorax: negative  •	Cardiovascular: negative  •	Gastrointestinal: negative  •	Genitourinary: negative  •	Musculoskeletal: negative  •	Neurological: negative  •	Psychiatric: negative  •	Hematology/Lymphatics: negative  •	Endocrine: negative  •	Allergic/Immunologic: negative    Physical Exam:       • Constitutional:	Well-developed, well nourished  • Eyes:	EOMI; PERRL; no drainage or redness  • ENMT:	No oral lesions; no gross abnormalities  • Neck	no thyromegaly or nodules  • Breasts:	not examined  • Back:	No deformity or limitation of movement  • Respiratory:	Breath Sounds equal & clear to auscultation, no accessory muscle use  • Cardiovascular:	Regular rate & rhythm, normal S1, S2; no murmurs, gallops or rubs; no S3, S4  • Gastrointestinal:	Soft, non-tender, no hepatosplenomegaly, normal bowel sounds  • Genitourinary:	not examined  • Rectal: not examined  • Extremities:	No cyanosis, clubbing or edema  • Vascular:	Equal and normal pulses (dorsalis pedis)  • Neurologica:l	not examined  • Skin:	No lesions; no rash  • Lymph Nodes:	No lymphadedenopathy  • Musculoskeletal:	No joint pain, swelling or deformity; no limitation of movement        LABS:      CBC Full  -  ( 10 Dec 2021 08:37 )  WBC Count : 7.92 K/uL  RBC Count : 4.09 M/uL  Hemoglobin : 11.7 g/dL  Hematocrit : 37.4 %  Platelet Count - Automated : 221 K/uL  Mean Cell Volume : 91.4 fl  Mean Cell Hemoglobin : 28.6 pg  Mean Cell Hemoglobin Concentration : 31.3 gm/dL  Auto Neutrophil # : x  Auto Lymphocyte # : x  Auto Monocyte # : x  Auto Eosinophil # : x  Auto Basophil # : x  Auto Neutrophil % : x  Auto Lymphocyte % : x  Auto Monocyte % : x  Auto Eosinophil % : x  Auto Basophil % : x    12-10    143  |  108  |  13  ----------------------------<  95  3.9   |  29  |  1.13    Ca    9.1      10 Dec 2021 08:37  Phos  3.5     12-10  Mg     2.0     12-10    TPro  7.3  /  Alb  3.4  /  TBili  0.4  /  DBili  x   /  AST  12  /  ALT  11  /  AlkPhos  73  12-09    PT/INR - ( 10 Dec 2021 08:37 )   PT: 13.5 sec;   INR: 1.13          PTT - ( 10 Dec 2021 08:37 )  PTT:40.9 sec                    RADIOLOGY & ADDITIONAL STUDIES (The following images were personally reviewed):  Linder:                                     No  Urine output:                       adequate  DVT prophylaxis:                 Yes  Flattus:                                  Yes  Bowel movement:              No   Interval Events: Reviewed  Patient seen and examined at bedside.    Patient is a 89y old  Male who presents with a chief complaint of P/E (10 Dec 2021 17:31)  no acute event overnight, RA 94 %      PAST MEDICAL & SURGICAL HISTORY:  Hypothyroidism    HTN (hypertension)    Esophageal spasm    BPH (benign prostatic hyperplasia)    BPH (benign prostatic hyperplasia)    No significant past surgical history        MEDICATIONS:  Pulmonary:    Antimicrobials:    Anticoagulants:  apixaban 10 milliGRAM(s) Oral every 12 hours    Cardiac:  lisinopril 10 milliGRAM(s) Oral daily  tamsulosin 0.4 milliGRAM(s) Oral daily      Allergies    penicillin (Rash)    Intolerances        Vital Signs Last 24 Hrs  T(C): 36.3 (11 Dec 2021 05:47), Max: 36.7 (10 Dec 2021 20:39)  T(F): 97.3 (11 Dec 2021 05:47), Max: 98.1 (10 Dec 2021 20:39)  HR: 84 (11 Dec 2021 05:47) (81 - 91)  BP: 185/75 (11 Dec 2021 05:47) (151/77 - 185/75)  BP(mean): --  RR: 18 (11 Dec 2021 05:47) (18 - 18)  SpO2: 94% (11 Dec 2021 05:47) (94% - 98%)        Review of Systems:   •	General: negative  •	Skin/Breast: negative  •	Ophthalmologic: negative  •	ENMT: negative  •	Respiratory and Thorax: negative  •	Cardiovascular: negative  •	Gastrointestinal: negative  •	Genitourinary: negative  •	Musculoskeletal: negative  •	Neurological: negative  •	Psychiatric: negative  •	Hematology/Lymphatics: negative  •	Endocrine: negative  •	Allergic/Immunologic: negative    Physical Exam:       • Constitutional:	Well-developed, well nourished  • Eyes:	EOMI; PERRL; no drainage or redness  • ENMT:	No oral lesions; no gross abnormalities  • Neck	no thyromegaly or nodules  • Breasts:	not examined  • Back:	No deformity or limitation of movement  • Respiratory:	Breath Sounds equal & clear to auscultation, no accessory muscle use  • Cardiovascular:	Regular rate & rhythm, normal S1, S2; no murmurs, gallops or rubs; no S3, S4  • Gastrointestinal:	Soft, non-tender, no hepatosplenomegaly, normal bowel sounds  • Genitourinary:	not examined  • Rectal: not examined  • Extremities:	No cyanosis, clubbing or edema  • Vascular:	Equal and normal pulses (dorsalis pedis)  • Neurologica:l	not examined  • Skin:	No lesions; no rash  • Lymph Nodes:	No lymphadedenopathy  • Musculoskeletal:	No joint pain, swelling or deformity; no limitation of movement        LABS:      CBC Full  -  ( 10 Dec 2021 08:37 )  WBC Count : 7.92 K/uL  RBC Count : 4.09 M/uL  Hemoglobin : 11.7 g/dL  Hematocrit : 37.4 %  Platelet Count - Automated : 221 K/uL  Mean Cell Volume : 91.4 fl  Mean Cell Hemoglobin : 28.6 pg  Mean Cell Hemoglobin Concentration : 31.3 gm/dL  Auto Neutrophil # : x  Auto Lymphocyte # : x  Auto Monocyte # : x  Auto Eosinophil # : x  Auto Basophil # : x  Auto Neutrophil % : x  Auto Lymphocyte % : x  Auto Monocyte % : x  Auto Eosinophil % : x  Auto Basophil % : x    12-10    143  |  108  |  13  ----------------------------<  95  3.9   |  29  |  1.13    Ca    9.1      10 Dec 2021 08:37  Phos  3.5     12-10  Mg     2.0     12-10    TPro  7.3  /  Alb  3.4  /  TBili  0.4  /  DBili  x   /  AST  12  /  ALT  11  /  AlkPhos  73  12-09    PT/INR - ( 10 Dec 2021 08:37 )   PT: 13.5 sec;   INR: 1.13          PTT - ( 10 Dec 2021 08:37 )  PTT:40.9 sec                    RADIOLOGY & ADDITIONAL STUDIES (The following images were personally reviewed):  Linder:                                     No  Urine output:                       adequate  DVT prophylaxis:                 Yes  Flattus:                                  Yes  Bowel movement:              No

## 2021-12-11 NOTE — OCCUPATIONAL THERAPY INITIAL EVALUATION ADULT - ANTICIPATED DISCHARGE DISPOSITION, OT EVAL
Detail Level: Generalized Detail Level: Detailed Home with home occupational therapy and family assist. This therapist educated pt on benefits of outpatient occupational therapy (to address decreased sensation, UB strengthening, balance) however due to COVID 19 pt would perfer to receieve in home therapy at this time. MD Yuan made aware./home w/ OT

## 2021-12-11 NOTE — PROGRESS NOTE ADULT - ASSESSMENT
89 y/o M PMHx HTN, hypothyroidism, esophageal spasms, BPH. Presents c/o chest pain since last night, found to have b/l pulmonary embolism on CTA 
89 y/o M PMHx HTN, hypothyroidism, esophageal spasms, BPH. Presents c/o chest pain since last night, found to have b/l pulmonary embolism on CTA

## 2021-12-11 NOTE — PROGRESS NOTE ADULT - PROBLEM SELECTOR PLAN 5
patient unable to turn himself or lift himself to complete physical exam without help from provider.    -PT consult

## 2021-12-11 NOTE — OCCUPATIONAL THERAPY INITIAL EVALUATION ADULT - DIAGNOSIS, OT EVAL
OT deficits include decreased BUE strength, ROM, impaired balance, decreased enduarance, numbness, tingling, and decreased sensation in BUE (L>R) and BLEs affecting ability to complete ADL, functional mobility and transfers. OT deficits include decreased BUE strength, ROM, impaired standing balance, decreased endurance, numbness, tingling, and decreased sensation in BUE  (L>R) and BLEs, tremulous movements with initiation of tasks in B/L hands (L >R) affecting ability to complete ADL, functional mobility and transfers.

## 2021-12-11 NOTE — OCCUPATIONAL THERAPY INITIAL EVALUATION ADULT - LEVEL OF INDEPENDENCE: DRESS LOWER BODY, OT EVAL
pt donned pants, socks and shoes. Required increased time as pt with B/L hand weakness and tremulous movements (L>R)/supervision

## 2021-12-11 NOTE — OCCUPATIONAL THERAPY INITIAL EVALUATION ADULT - RANGE OF MOTION EXAMINATION, UPPER EXTREMITY
LUE pronation 0-10, supination 0-5 degrees; L elbow flexion 0-30;/Right UE Active ROM was WFL (within functional limits)

## 2021-12-11 NOTE — DISCHARGE NOTE NURSING/CASE MANAGEMENT/SOCIAL WORK - NSDCPEFALRISK_GEN_ALL_CORE
For information on Fall & Injury Prevention, visit: [email or web address] OR  [email or web address] OR  https://www.cdc.gov/steadi/patient.html

## 2021-12-11 NOTE — OCCUPATIONAL THERAPY INITIAL EVALUATION ADULT - IMPAIRMENTS CONTRIBUTING IMPAIRED BED MOBILITY, REHAB EVAL
TAVR APRN    Notified patient that TAVR procedure planned for 9/23 will be postponed related to COVID pandemic OR/ ICU bed availability conditions.  Advised pt to report to [facility] ER for symptoms of syncope, dyspnea at rest, or new/worsening chest pain.      Mary BRAY   impaired balance

## 2021-12-13 PROBLEM — N40.0 BENIGN PROSTATIC HYPERPLASIA WITHOUT LOWER URINARY TRACT SYMPTOMS: Chronic | Status: ACTIVE | Noted: 2021-12-10

## 2021-12-13 PROBLEM — N40.0 BENIGN PROSTATIC HYPERPLASIA WITHOUT LOWER URINARY TRACT SYMPTOMS: Chronic | Status: ACTIVE | Noted: 2021-12-09

## 2021-12-16 DIAGNOSIS — Z87.891 PERSONAL HISTORY OF NICOTINE DEPENDENCE: ICD-10-CM

## 2021-12-16 DIAGNOSIS — N40.1 BENIGN PROSTATIC HYPERPLASIA WITH LOWER URINARY TRACT SYMPTOMS: ICD-10-CM

## 2021-12-16 DIAGNOSIS — Z86.718 PERSONAL HISTORY OF OTHER VENOUS THROMBOSIS AND EMBOLISM: ICD-10-CM

## 2021-12-16 DIAGNOSIS — Z83.2 FAMILY HISTORY OF DISEASES OF THE BLOOD AND BLOOD-FORMING ORGANS AND CERTAIN DISORDERS INVOLVING THE IMMUNE MECHANISM: ICD-10-CM

## 2021-12-16 DIAGNOSIS — Z86.711 PERSONAL HISTORY OF PULMONARY EMBOLISM: ICD-10-CM

## 2021-12-16 DIAGNOSIS — R07.9 CHEST PAIN, UNSPECIFIED: ICD-10-CM

## 2021-12-16 DIAGNOSIS — I26.99 OTHER PULMONARY EMBOLISM WITHOUT ACUTE COR PULMONALE: ICD-10-CM

## 2021-12-16 DIAGNOSIS — K22.4 DYSKINESIA OF ESOPHAGUS: ICD-10-CM

## 2021-12-16 DIAGNOSIS — I10 ESSENTIAL (PRIMARY) HYPERTENSION: ICD-10-CM

## 2021-12-16 DIAGNOSIS — R53.1 WEAKNESS: ICD-10-CM

## 2021-12-16 DIAGNOSIS — J98.11 ATELECTASIS: ICD-10-CM

## 2021-12-16 DIAGNOSIS — R33.8 OTHER RETENTION OF URINE: ICD-10-CM

## 2021-12-16 DIAGNOSIS — Z88.0 ALLERGY STATUS TO PENICILLIN: ICD-10-CM

## 2021-12-16 DIAGNOSIS — E03.9 HYPOTHYROIDISM, UNSPECIFIED: ICD-10-CM

## 2021-12-29 ENCOUNTER — APPOINTMENT (OUTPATIENT)
Dept: PULMONOLOGY | Facility: CLINIC | Age: 86
End: 2021-12-29
Payer: MEDICARE

## 2021-12-29 DIAGNOSIS — R05.9 COUGH, UNSPECIFIED: ICD-10-CM

## 2021-12-29 DIAGNOSIS — Z86.711 PERSONAL HISTORY OF PULMONARY EMBOLISM: ICD-10-CM

## 2021-12-29 PROCEDURE — 99443: CPT | Mod: 95

## 2021-12-29 NOTE — HISTORY OF PRESENT ILLNESS
[Never] : never [TextBox_4] : 89 yr old male with PMH of PE, AVM.  Presents today for telemed due to cough.  \par \par Pt has complaints of weakness.  Pt with AVM with trace of bleeding and constipation.  He needed to take senna and had small BM after which was hard.  He had the bleeding during the BM with staining.  Stool was not dark.  No bleeding form urine, coughing blood or bleeding from nose or gums.  He denies any falling.  Denies SOB he is biking without SOB.  He had a routine angiogram.  Denies chest pain. /77, HR 77.

## 2021-12-29 NOTE — END OF VISIT
[Time Spent: ___ minutes] : I have spent [unfilled] minutes of time on the encounter. [FreeTextEntry3] : Pt seen with AGUSTO stephen and agree on the above plan.

## 2021-12-29 NOTE — REASON FOR VISIT
[Home] : at home, [unfilled] , at the time of the visit. [Medical Office: ([facility])___] : at the medical office located in  [Verbal consent obtained from patient] : the patient, [unfilled] [Follow-Up] : a follow-up visit [Pulmonary Embolism] : pulmonary embolism

## 2021-12-29 NOTE — ASSESSMENT
[FreeTextEntry1] : PE\par \par Pt is currently on Eliquis 5 mg BID without any significant signs of bleeding or falls.  I will follow on lab work and will order a home draw to evaluate his HGB due to minor bleeding with constipation in one BM.  This is most likely due to trauma due to constipation.  He is to continue with senna due to constipation.  \par \par Follow up in one month in office.

## 2021-12-30 PROCEDURE — 84100 ASSAY OF PHOSPHORUS: CPT

## 2021-12-30 PROCEDURE — 87635 SARS-COV-2 COVID-19 AMP PRB: CPT

## 2021-12-30 PROCEDURE — 96374 THER/PROPH/DIAG INJ IV PUSH: CPT

## 2021-12-30 PROCEDURE — 80053 COMPREHEN METABOLIC PANEL: CPT

## 2021-12-30 PROCEDURE — 74174 CTA ABD&PLVS W/CONTRAST: CPT | Mod: MA

## 2021-12-30 PROCEDURE — 84484 ASSAY OF TROPONIN QUANT: CPT

## 2021-12-30 PROCEDURE — 75574 CT ANGIO HRT W/3D IMAGE: CPT | Mod: MA

## 2021-12-30 PROCEDURE — 85610 PROTHROMBIN TIME: CPT

## 2021-12-30 PROCEDURE — 93970 EXTREMITY STUDY: CPT

## 2021-12-30 PROCEDURE — 93306 TTE W/DOPPLER COMPLETE: CPT

## 2021-12-30 PROCEDURE — 36415 COLL VENOUS BLD VENIPUNCTURE: CPT

## 2021-12-30 PROCEDURE — 83735 ASSAY OF MAGNESIUM: CPT

## 2021-12-30 PROCEDURE — 93005 ELECTROCARDIOGRAM TRACING: CPT

## 2021-12-30 PROCEDURE — 80048 BASIC METABOLIC PNL TOTAL CA: CPT

## 2021-12-30 PROCEDURE — 85027 COMPLETE CBC AUTOMATED: CPT

## 2021-12-30 PROCEDURE — 71275 CT ANGIOGRAPHY CHEST: CPT | Mod: MA

## 2021-12-30 PROCEDURE — 99285 EMERGENCY DEPT VISIT HI MDM: CPT | Mod: 25

## 2021-12-30 PROCEDURE — 85730 THROMBOPLASTIN TIME PARTIAL: CPT

## 2021-12-30 PROCEDURE — 97161 PT EVAL LOW COMPLEX 20 MIN: CPT

## 2021-12-30 PROCEDURE — 71045 X-RAY EXAM CHEST 1 VIEW: CPT

## 2021-12-30 PROCEDURE — 85025 COMPLETE CBC W/AUTO DIFF WBC: CPT

## 2022-01-05 ENCOUNTER — LABORATORY RESULT (OUTPATIENT)
Age: 87
End: 2022-01-05

## 2022-01-10 RX ORDER — APIXABAN 5 MG/1
5 TABLET, FILM COATED ORAL
Qty: 60 | Refills: 5 | Status: ACTIVE | COMMUNITY
Start: 2021-12-29 | End: 1900-01-01

## 2022-01-18 ENCOUNTER — APPOINTMENT (OUTPATIENT)
Dept: PULMONOLOGY | Facility: CLINIC | Age: 87
End: 2022-01-18

## 2022-07-09 ENCOUNTER — EMERGENCY (EMERGENCY)
Facility: HOSPITAL | Age: 87
LOS: 1 days | Discharge: ANOTHER HOSPITAL | End: 2022-07-09
Attending: STUDENT IN AN ORGANIZED HEALTH CARE EDUCATION/TRAINING PROGRAM | Admitting: EMERGENCY MEDICINE
Payer: MEDICARE

## 2022-07-09 VITALS
DIASTOLIC BLOOD PRESSURE: 64 MMHG | RESPIRATION RATE: 17 BRPM | OXYGEN SATURATION: 92 % | SYSTOLIC BLOOD PRESSURE: 99 MMHG | TEMPERATURE: 98 F | HEIGHT: 71 IN | HEART RATE: 112 BPM | WEIGHT: 134.92 LBS

## 2022-07-09 VITALS
HEART RATE: 114 BPM | OXYGEN SATURATION: 94 % | SYSTOLIC BLOOD PRESSURE: 106 MMHG | RESPIRATION RATE: 18 BRPM | DIASTOLIC BLOOD PRESSURE: 57 MMHG | TEMPERATURE: 98 F

## 2022-07-09 DIAGNOSIS — Z87.39 PERSONAL HISTORY OF OTHER DISEASES OF THE MUSCULOSKELETAL SYSTEM AND CONNECTIVE TISSUE: ICD-10-CM

## 2022-07-09 DIAGNOSIS — R32 UNSPECIFIED URINARY INCONTINENCE: ICD-10-CM

## 2022-07-09 DIAGNOSIS — N40.0 BENIGN PROSTATIC HYPERPLASIA WITHOUT LOWER URINARY TRACT SYMPTOMS: ICD-10-CM
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
## 2022-07-09 DIAGNOSIS — Z79.01 LONG TERM (CURRENT) USE OF ANTICOAGULANTS: ICD-10-CM

## 2022-07-09 DIAGNOSIS — Z20.822 CONTACT WITH AND (SUSPECTED) EXPOSURE TO COVID-19: ICD-10-CM

## 2022-07-09 DIAGNOSIS — R00.0 TACHYCARDIA, UNSPECIFIED: ICD-10-CM

## 2022-07-09 DIAGNOSIS — E03.9 HYPOTHYROIDISM, UNSPECIFIED: ICD-10-CM

## 2022-07-09 DIAGNOSIS — J69.0 PNEUMONITIS DUE TO INHALATION OF FOOD AND VOMIT: ICD-10-CM

## 2022-07-09 DIAGNOSIS — S22.41XA MULTIPLE FRACTURES OF RIBS, RIGHT SIDE, INITIAL ENCOUNTER FOR CLOSED FRACTURE: ICD-10-CM

## 2022-07-09 DIAGNOSIS — K22.4 DYSKINESIA OF ESOPHAGUS: ICD-10-CM

## 2022-07-09 DIAGNOSIS — R53.1 WEAKNESS: ICD-10-CM
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
## 2022-07-09 DIAGNOSIS — G62.9 POLYNEUROPATHY, UNSPECIFIED: ICD-10-CM

## 2022-07-09 DIAGNOSIS — N39.0 URINARY TRACT INFECTION, SITE NOT SPECIFIED: ICD-10-CM

## 2022-07-09 DIAGNOSIS — R63.0 ANOREXIA: ICD-10-CM

## 2022-07-09 DIAGNOSIS — Z86.711 PERSONAL HISTORY OF PULMONARY EMBOLISM: ICD-10-CM

## 2022-07-09 DIAGNOSIS — E87.5 HYPERKALEMIA: ICD-10-CM

## 2022-07-09 DIAGNOSIS — K44.9 DIAPHRAGMATIC HERNIA WITHOUT OBSTRUCTION OR GANGRENE: ICD-10-CM

## 2022-07-09 DIAGNOSIS — R77.8 OTHER SPECIFIED ABNORMALITIES OF PLASMA PROTEINS: ICD-10-CM

## 2022-07-09 DIAGNOSIS — I10 ESSENTIAL (PRIMARY) HYPERTENSION: ICD-10-CM

## 2022-07-09 DIAGNOSIS — Z88.0 ALLERGY STATUS TO PENICILLIN: ICD-10-CM

## 2022-07-09 DIAGNOSIS — N17.9 ACUTE KIDNEY FAILURE, UNSPECIFIED: ICD-10-CM

## 2022-07-09 LAB
ANION GAP SERPL CALC-SCNC: 17 MMOL/L — SIGNIFICANT CHANGE UP (ref 5–17)
ANION GAP SERPL CALC-SCNC: 17 MMOL/L — SIGNIFICANT CHANGE UP (ref 5–17)
APPEARANCE UR: CLEAR — SIGNIFICANT CHANGE UP
APTT BLD: 27.1 SEC — LOW (ref 27.5–35.5)
BACTERIA # UR AUTO: ABNORMAL /HPF
BASE EXCESS BLDV CALC-SCNC: -1.5 MMOL/L — SIGNIFICANT CHANGE UP (ref -2–3)
BASOPHILS # BLD AUTO: 0.08 K/UL — SIGNIFICANT CHANGE UP (ref 0–0.2)
BASOPHILS NFR BLD AUTO: 0.9 % — SIGNIFICANT CHANGE UP (ref 0–2)
BILIRUB UR-MCNC: NEGATIVE — SIGNIFICANT CHANGE UP
BUN SERPL-MCNC: 54 MG/DL — HIGH (ref 7–23)
BUN SERPL-MCNC: 56 MG/DL — HIGH (ref 7–23)
BURR CELLS BLD QL SMEAR: PRESENT — SIGNIFICANT CHANGE UP
CA-I SERPL-SCNC: 1.2 MMOL/L — SIGNIFICANT CHANGE UP (ref 1.15–1.33)
CALCIUM SERPL-MCNC: 8.5 MG/DL — SIGNIFICANT CHANGE UP (ref 8.4–10.5)
CALCIUM SERPL-MCNC: 9.6 MG/DL — SIGNIFICANT CHANGE UP (ref 8.4–10.5)
CHLORIDE SERPL-SCNC: 94 MMOL/L — LOW (ref 96–108)
CHLORIDE SERPL-SCNC: 96 MMOL/L — SIGNIFICANT CHANGE UP (ref 96–108)
CO2 BLDV-SCNC: 24 MMOL/L — SIGNIFICANT CHANGE UP (ref 22–26)
CO2 SERPL-SCNC: 20 MMOL/L — LOW (ref 22–31)
CO2 SERPL-SCNC: 21 MMOL/L — LOW (ref 22–31)
COLOR SPEC: YELLOW — SIGNIFICANT CHANGE UP
COMMENT - URINE: SIGNIFICANT CHANGE UP
CREAT SERPL-MCNC: 1.68 MG/DL — HIGH (ref 0.5–1.3)
CREAT SERPL-MCNC: 1.82 MG/DL — HIGH (ref 0.5–1.3)
DIFF PNL FLD: NEGATIVE — SIGNIFICANT CHANGE UP
EGFR: 35 ML/MIN/1.73M2 — LOW
EGFR: 38 ML/MIN/1.73M2 — LOW
EOSINOPHIL # BLD AUTO: 0 K/UL — SIGNIFICANT CHANGE UP (ref 0–0.5)
EOSINOPHIL NFR BLD AUTO: 0 % — SIGNIFICANT CHANGE UP (ref 0–6)
EPI CELLS # UR: SIGNIFICANT CHANGE UP /HPF (ref 0–5)
FLUAV AG NPH QL: SIGNIFICANT CHANGE UP
FLUBV AG NPH QL: SIGNIFICANT CHANGE UP
GAS PNL BLDV: 130 MMOL/L — LOW (ref 136–145)
GAS PNL BLDV: SIGNIFICANT CHANGE UP
GLUCOSE SERPL-MCNC: 163 MG/DL — HIGH (ref 70–99)
GLUCOSE SERPL-MCNC: 196 MG/DL — HIGH (ref 70–99)
GLUCOSE UR QL: NEGATIVE — SIGNIFICANT CHANGE UP
HCO3 BLDV-SCNC: 23 MMOL/L — SIGNIFICANT CHANGE UP (ref 22–29)
HCT VFR BLD CALC: 38.6 % — LOW (ref 39–50)
HCT VFR BLD CALC: 39 % — SIGNIFICANT CHANGE UP (ref 39–50)
HGB BLD-MCNC: 13 G/DL — SIGNIFICANT CHANGE UP (ref 13–17)
HGB BLD-MCNC: 13.1 G/DL — SIGNIFICANT CHANGE UP (ref 13–17)
INR BLD: 1.05 — SIGNIFICANT CHANGE UP (ref 0.88–1.16)
KETONES UR-MCNC: ABNORMAL MG/DL
LACTATE SERPL-SCNC: 1.7 MMOL/L — SIGNIFICANT CHANGE UP (ref 0.5–2)
LACTATE SERPL-SCNC: 2.6 MMOL/L — HIGH (ref 0.5–2)
LEUKOCYTE ESTERASE UR-ACNC: ABNORMAL
LYMPHOCYTES # BLD AUTO: 0.6 K/UL — LOW (ref 1–3.3)
LYMPHOCYTES # BLD AUTO: 6.8 % — LOW (ref 13–44)
MAGNESIUM SERPL-MCNC: 2.6 MG/DL — SIGNIFICANT CHANGE UP (ref 1.6–2.6)
MANUAL SMEAR VERIFICATION: SIGNIFICANT CHANGE UP
MCHC RBC-ENTMCNC: 29 PG — SIGNIFICANT CHANGE UP (ref 27–34)
MCHC RBC-ENTMCNC: 29.2 PG — SIGNIFICANT CHANGE UP (ref 27–34)
MCHC RBC-ENTMCNC: 33.6 GM/DL — SIGNIFICANT CHANGE UP (ref 32–36)
MCHC RBC-ENTMCNC: 33.7 GM/DL — SIGNIFICANT CHANGE UP (ref 32–36)
MCV RBC AUTO: 86.2 FL — SIGNIFICANT CHANGE UP (ref 80–100)
MCV RBC AUTO: 87.1 FL — SIGNIFICANT CHANGE UP (ref 80–100)
MONOCYTES # BLD AUTO: 1.82 K/UL — HIGH (ref 0–0.9)
MONOCYTES NFR BLD AUTO: 20.5 % — HIGH (ref 2–14)
NEUTROPHILS # BLD AUTO: 6.3 K/UL — SIGNIFICANT CHANGE UP (ref 1.8–7.4)
NEUTROPHILS NFR BLD AUTO: 62.4 % — SIGNIFICANT CHANGE UP (ref 43–77)
NEUTS BAND # BLD: 8.5 % — HIGH (ref 0–8)
NITRITE UR-MCNC: NEGATIVE — SIGNIFICANT CHANGE UP
NRBC # BLD: 0 /100 WBCS — SIGNIFICANT CHANGE UP (ref 0–0)
PCO2 BLDV: 37 MMHG — LOW (ref 42–55)
PH BLDV: 7.4 — SIGNIFICANT CHANGE UP (ref 7.32–7.43)
PH UR: 5.5 — SIGNIFICANT CHANGE UP (ref 5–8)
PLAT MORPH BLD: ABNORMAL
PLATELET # BLD AUTO: 366 K/UL — SIGNIFICANT CHANGE UP (ref 150–400)
PLATELET # BLD AUTO: 369 K/UL — SIGNIFICANT CHANGE UP (ref 150–400)
PO2 BLDV: <33 MMHG — SIGNIFICANT CHANGE UP (ref 25–45)
POIKILOCYTOSIS BLD QL AUTO: SLIGHT — SIGNIFICANT CHANGE UP
POTASSIUM BLDV-SCNC: 5.6 MMOL/L — HIGH (ref 3.5–5.1)
POTASSIUM SERPL-MCNC: 4.5 MMOL/L — SIGNIFICANT CHANGE UP (ref 3.5–5.3)
POTASSIUM SERPL-MCNC: 5.6 MMOL/L — HIGH (ref 3.5–5.3)
POTASSIUM SERPL-SCNC: 4.5 MMOL/L — SIGNIFICANT CHANGE UP (ref 3.5–5.3)
POTASSIUM SERPL-SCNC: 5.6 MMOL/L — HIGH (ref 3.5–5.3)
PROT UR-MCNC: ABNORMAL MG/DL
PROTHROM AB SERPL-ACNC: 12.5 SEC — SIGNIFICANT CHANGE UP (ref 10.5–13.4)
RBC # BLD: 4.48 M/UL — SIGNIFICANT CHANGE UP (ref 4.2–5.8)
RBC # BLD: 4.48 M/UL — SIGNIFICANT CHANGE UP (ref 4.2–5.8)
RBC # FLD: 12.8 % — SIGNIFICANT CHANGE UP (ref 10.3–14.5)
RBC # FLD: 12.9 % — SIGNIFICANT CHANGE UP (ref 10.3–14.5)
RBC BLD AUTO: ABNORMAL
RBC CASTS # UR COMP ASSIST: < 5 /HPF — SIGNIFICANT CHANGE UP
RSV RNA NPH QL NAA+NON-PROBE: SIGNIFICANT CHANGE UP
SAO2 % BLDV: 41.9 % — LOW (ref 67–88)
SARS-COV-2 RNA SPEC QL NAA+PROBE: SIGNIFICANT CHANGE UP
SODIUM SERPL-SCNC: 132 MMOL/L — LOW (ref 135–145)
SODIUM SERPL-SCNC: 133 MMOL/L — LOW (ref 135–145)
SP GR SPEC: >=1.03 — SIGNIFICANT CHANGE UP (ref 1–1.03)
SPHEROCYTES BLD QL SMEAR: SLIGHT — SIGNIFICANT CHANGE UP
UROBILINOGEN FLD QL: 0.2 E.U./DL — SIGNIFICANT CHANGE UP
VARIANT LYMPHS # BLD: 0.9 % — SIGNIFICANT CHANGE UP (ref 0–6)
WBC # BLD: 7.63 K/UL — SIGNIFICANT CHANGE UP (ref 3.8–10.5)
WBC # BLD: 8.88 K/UL — SIGNIFICANT CHANGE UP (ref 3.8–10.5)
WBC # FLD AUTO: 7.63 K/UL — SIGNIFICANT CHANGE UP (ref 3.8–10.5)
WBC # FLD AUTO: 8.88 K/UL — SIGNIFICANT CHANGE UP (ref 3.8–10.5)
WBC UR QL: ABNORMAL /HPF

## 2022-07-09 PROCEDURE — 71275 CT ANGIOGRAPHY CHEST: CPT | Mod: MA

## 2022-07-09 PROCEDURE — 71275 CT ANGIOGRAPHY CHEST: CPT | Mod: 26,MA

## 2022-07-09 PROCEDURE — 96365 THER/PROPH/DIAG IV INF INIT: CPT | Mod: XU

## 2022-07-09 PROCEDURE — 84484 ASSAY OF TROPONIN QUANT: CPT

## 2022-07-09 PROCEDURE — 36415 COLL VENOUS BLD VENIPUNCTURE: CPT

## 2022-07-09 PROCEDURE — 87186 SC STD MICRODIL/AGAR DIL: CPT

## 2022-07-09 PROCEDURE — 85610 PROTHROMBIN TIME: CPT

## 2022-07-09 PROCEDURE — 71045 X-RAY EXAM CHEST 1 VIEW: CPT | Mod: 26

## 2022-07-09 PROCEDURE — 84295 ASSAY OF SERUM SODIUM: CPT

## 2022-07-09 PROCEDURE — 87637 SARSCOV2&INF A&B&RSV AMP PRB: CPT

## 2022-07-09 PROCEDURE — 87040 BLOOD CULTURE FOR BACTERIA: CPT

## 2022-07-09 PROCEDURE — 83735 ASSAY OF MAGNESIUM: CPT

## 2022-07-09 PROCEDURE — 93010 ELECTROCARDIOGRAM REPORT: CPT

## 2022-07-09 PROCEDURE — 99285 EMERGENCY DEPT VISIT HI MDM: CPT | Mod: 25

## 2022-07-09 PROCEDURE — 96375 TX/PRO/DX INJ NEW DRUG ADDON: CPT | Mod: XU

## 2022-07-09 PROCEDURE — 82962 GLUCOSE BLOOD TEST: CPT

## 2022-07-09 PROCEDURE — 85025 COMPLETE CBC W/AUTO DIFF WBC: CPT

## 2022-07-09 PROCEDURE — 82330 ASSAY OF CALCIUM: CPT

## 2022-07-09 PROCEDURE — 93970 EXTREMITY STUDY: CPT | Mod: 26

## 2022-07-09 PROCEDURE — 83880 ASSAY OF NATRIURETIC PEPTIDE: CPT

## 2022-07-09 PROCEDURE — 83605 ASSAY OF LACTIC ACID: CPT

## 2022-07-09 PROCEDURE — 85730 THROMBOPLASTIN TIME PARTIAL: CPT

## 2022-07-09 PROCEDURE — 71045 X-RAY EXAM CHEST 1 VIEW: CPT

## 2022-07-09 PROCEDURE — 74176 CT ABD & PELVIS W/O CONTRAST: CPT | Mod: MA

## 2022-07-09 PROCEDURE — 93970 EXTREMITY STUDY: CPT

## 2022-07-09 PROCEDURE — 87086 URINE CULTURE/COLONY COUNT: CPT

## 2022-07-09 PROCEDURE — 84132 ASSAY OF SERUM POTASSIUM: CPT

## 2022-07-09 PROCEDURE — 80048 BASIC METABOLIC PNL TOTAL CA: CPT

## 2022-07-09 PROCEDURE — 81001 URINALYSIS AUTO W/SCOPE: CPT

## 2022-07-09 PROCEDURE — 82803 BLOOD GASES ANY COMBINATION: CPT

## 2022-07-09 PROCEDURE — 93005 ELECTROCARDIOGRAM TRACING: CPT

## 2022-07-09 PROCEDURE — 74176 CT ABD & PELVIS W/O CONTRAST: CPT | Mod: 26,MA

## 2022-07-09 PROCEDURE — 85027 COMPLETE CBC AUTOMATED: CPT

## 2022-07-09 RX ORDER — SODIUM CHLORIDE 9 MG/ML
500 INJECTION INTRAMUSCULAR; INTRAVENOUS; SUBCUTANEOUS ONCE
Refills: 0 | Status: COMPLETED | OUTPATIENT
Start: 2022-07-09 | End: 2022-07-09

## 2022-07-09 RX ORDER — HEPARIN SODIUM 5000 [USP'U]/ML
INJECTION INTRAVENOUS; SUBCUTANEOUS
Qty: 25000 | Refills: 0 | Status: DISCONTINUED | OUTPATIENT
Start: 2022-07-09 | End: 2022-07-09

## 2022-07-09 RX ORDER — HEPARIN SODIUM 5000 [USP'U]/ML
5000 INJECTION INTRAVENOUS; SUBCUTANEOUS ONCE
Refills: 0 | Status: COMPLETED | OUTPATIENT
Start: 2022-07-09 | End: 2022-07-09

## 2022-07-09 RX ORDER — SODIUM ZIRCONIUM CYCLOSILICATE 10 G/10G
5 POWDER, FOR SUSPENSION ORAL ONCE
Refills: 0 | Status: COMPLETED | OUTPATIENT
Start: 2022-07-09 | End: 2022-07-09

## 2022-07-09 RX ORDER — CEFTRIAXONE 500 MG/1
1000 INJECTION, POWDER, FOR SOLUTION INTRAMUSCULAR; INTRAVENOUS ONCE
Refills: 0 | Status: COMPLETED | OUTPATIENT
Start: 2022-07-09 | End: 2022-07-09

## 2022-07-09 RX ADMIN — HEPARIN SODIUM 1100 UNIT(S)/HR: 5000 INJECTION INTRAVENOUS; SUBCUTANEOUS at 03:47

## 2022-07-09 RX ADMIN — CEFTRIAXONE 100 MILLIGRAM(S): 500 INJECTION, POWDER, FOR SOLUTION INTRAMUSCULAR; INTRAVENOUS at 02:51

## 2022-07-09 RX ADMIN — HEPARIN SODIUM 5000 UNIT(S): 5000 INJECTION INTRAVENOUS; SUBCUTANEOUS at 03:48

## 2022-07-09 RX ADMIN — CEFTRIAXONE 1000 MILLIGRAM(S): 500 INJECTION, POWDER, FOR SOLUTION INTRAMUSCULAR; INTRAVENOUS at 03:32

## 2022-07-09 RX ADMIN — SODIUM CHLORIDE 1000 MILLILITER(S): 9 INJECTION INTRAMUSCULAR; INTRAVENOUS; SUBCUTANEOUS at 03:32

## 2022-07-09 RX ADMIN — SODIUM ZIRCONIUM CYCLOSILICATE 5 GRAM(S): 10 POWDER, FOR SUSPENSION ORAL at 02:51

## 2022-07-09 RX ADMIN — SODIUM CHLORIDE 1000 MILLILITER(S): 9 INJECTION INTRAMUSCULAR; INTRAVENOUS; SUBCUTANEOUS at 02:52

## 2022-07-09 RX ADMIN — SODIUM CHLORIDE 1000 MILLILITER(S): 9 INJECTION INTRAMUSCULAR; INTRAVENOUS; SUBCUTANEOUS at 09:30

## 2022-07-09 NOTE — ED ADULT NURSE REASSESSMENT NOTE - NS ED NURSE REASSESS COMMENT FT1
Wife at bedside refused for  to be changed into a gown. Per wife, "I don't want him to catch a cold." 4 blankets on top of patient noted. Risks and benefits presented to wife, verbalized understanding.

## 2022-07-09 NOTE — ED PROVIDER NOTE - OBJECTIVE STATEMENT
Pt w/ PMHx HTN, hypothyroidism, esophageal spasms, BPH, PE in Dec 2021, no longer on AC,, C2-C4 spinal fixation, chronic urinary incontinence and self-catheterizes, no p/w 2-3 days gen weakness, worse tonight, unable to stand on his own (he normally walks on his own w/out cane / walker). 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 He reports productive cough. No CP, SOB. He is vaccinated against COVID19 w/ booster.

## 2022-07-09 NOTE — ED PROVIDER NOTE - PHYSICAL EXAMINATION
Constitutional: Elderly, frail, awake, alert, oriented to person, place, time/situation and in no apparent distress.  ENMT: Airway patent. Normal MM  Eyes: Clear bilaterally  Cardiac: Normal rate, regular rhythm.  Heart sounds S1, S2.  No murmurs, rubs or gallops.  Respiratory: Rhonchi on L. No increased WOB, tachypnea,  or accessory mm use. Pt speaks in full sentences. Sp02 92% on RA  Gastrointestinal: Abd soft, NT, ND, NABS. No guarding, rebound, or rigidity. No pulsatile abdominal masses. No organomegaly appreciated. No CVAT   Musculoskeletal: Range of motion is not limited  Neuro: Alert and oriented x 3, face symmetric and speech fluent. Strength 5/5 x 4 ext and symmetric, nml gross motor movement, nml gait. No focal deficits noted.  Skin: Skin normal color for race, warm, dry and intact. No evidence of rash.  Psych: Alert and oriented to person, place, time/situation. normal mood and affect. no apparent risk to self or others.

## 2022-07-09 NOTE — ED PROVIDER NOTE - PROGRESS NOTE DETAILS
[facility] w Sp02 90-92 on RA. Prior PE 12/2021 unclear if provoked or not, no longer on AC, unclear if provoked. Given CAMILO, will not CTA at this time. Given CAMILO and chronic urinary incontinence and self catheterizes will place Linder as possible pre-renal etiology. Given high risk for PE, no bleeding risks, will start heparin. Pt is full code. Creatinine improved, lactate normalized. Persistent tachycardia, CTA PE performed. MICU consulted for possible 7 [facility] admission Pt seen by MICU, can be admitted to [facility]. CTA report ? SBO. Pt denies abd pain. + known hiatal hernia. No vomiting. Last BM yesterday evening prior to coming to the ED. received signout pending imaging.  89 M hypothyroidism, peripheral neuropathy (onset 3 years ago after spinal surgery), esophageal spasms p/w 3 days poor PO intake, generalized weakness.  found to have + UTI, CAMILO, lact 2.6 - ICU consulted and thought stable for RMF but persistently tachycardic, hypoxia 92 %; + trop 0.03 and bnp RA therefore CTA chest, no PE + asp PNA, ? SBO vs ileus therefore CT a/p, shows acute posterior R 9-11th rib fractures, t10 vert body fx.  confirmed pt fell 10 days ago, no head trauma.  + ttp over R ribs.  d/w patient and transfer to [facility] ED for trauma eval.  accepted by Dr. Jackson in ED

## 2022-07-09 NOTE — CONSULT NOTE ADULT - SUBJECTIVE AND OBJECTIVE BOX
Patient is a 89y old  Male who presents with a chief complaint of     Consult reason:  ED vitals: T   HR   RR  BP  SpO2  Labs signficant:  Imaging/EKG:   Interventions:    HPI:      Allergies    penicillin (Rash)    Intolerances      Home Medications:  benazepril 10 mg oral tablet: 1 tab(s) orally once a day (09 Dec 2021 19:39)  finasteride 5 mg oral tablet: 1 tab(s) orally once a day (09 Dec 2021 19:39)  silodosin 4 mg oral capsule: 1 cap(s) orally once a day (09 Dec 2021 19:39)      SOCIAL HX:     Smoking          ETOH/Illicit drugs          Occupation    PAST MEDICAL & SURGICAL HISTORY:  Hypothyroidism      HTN (hypertension)      Esophageal spasm      BPH (benign prostatic hyperplasia)      BPH (benign prostatic hyperplasia)      No significant past surgical history          FAMILY HISTORY:  :    No known cardiovascular or pulmonary family history     ROS:  See HPI     PHYSICAL EXAM    ICU Vital Signs Last 24 Hrs  T(C): 36.8 (09 Jul 2022 06:00), Max: 36.8 (09 Jul 2022 06:00)  T(F): 98.2 (09 Jul 2022 06:00), Max: 98.2 (09 Jul 2022 06:00)  HR: 118 (09 Jul 2022 06:00) (104 - 118)  BP: 109/58 (09 Jul 2022 06:00) (99/64 - 120/60)  BP(mean): --  ABP: --  ABP(mean): --  RR: 18 (09 Jul 2022 06:00) (17 - 18)  SpO2: 95% (09 Jul 2022 06:00) (92% - 95%)    O2 Parameters below as of 09 Jul 2022 06:00  Patient On (Oxygen Delivery Method): nasal cannula  O2 Flow (L/min): 2          General: Not in distress  HEENT:  CARLOTA              Lymphatic system: No LN  Lungs: Bilateral BS  Cardiovascular: Regular  Gastrointestinal: Soft, Positive BS  Musculoskeletal: No clubbing.  Moves all extremities.    Skin: Warm.  Intact  Neurological: No motor or sensory deficit       07-08-22 @ 07:01  -  07-09-22 @ 06:38  --------------------------------------------------------  IN:  Total IN: 0 mL    OUT:    Intermittent Catheterization - Urethral (mL): 300 mL  Total OUT: 300 mL    Total NET: -300 mL          LABS:                          13.1   8.88  )-----------( 366      ( 09 Jul 2022 01:25 )             39.0                                               07-09    133<L>  |  96  |  54<H>  ----------------------------<  196<H>  4.5   |  20<L>  |  1.68<H>    Ca    8.5      09 Jul 2022 05:08  Mg     2.6     07-09        PT/INR - ( 09 Jul 2022 03:21 )   PT: 12.5 sec;   INR: 1.05          PTT - ( 09 Jul 2022 03:21 )  PTT:27.1 sec                                       Urinalysis Basic - ( 09 Jul 2022 01:58 )    Color: Yellow / Appearance: Clear / SG: >=1.030 / pH: x  Gluc: x / Ketone: Trace mg/dL  / Bili: Negative / Urobili: 0.2 E.U./dL   Blood: x / Protein: Trace mg/dL / Nitrite: NEGATIVE   Leuk Esterase: Small / RBC: < 5 /HPF / WBC Many /HPF   Sq Epi: x / Non Sq Epi: 0-5 /HPF / Bacteria: Many /HPF    CARDIAC MARKERS ( 09 Jul 2022 05:08 )  x     / 0.03 ng/mL / x     / x     / x                                                                                    Urinalysis with Rflx Culture (collected 09 Jul 2022 01:58)                                                                                               CXR:    ECHO:    MEDICATIONS  (STANDING):  heparin  Infusion.  Unit(s)/Hr (11 mL/Hr) IV Continuous <Continuous>    MEDICATIONS  (PRN):         Patient is a 89y old  Male PMHx HTN, hypothyroidism, peripheral neuropathy (onset 3 years ago after spinal surgery), esophageal spasms, BPH presenting with c/o weakness and poor PO intake for the last 3 days. History was obtained from the wife who was present a the  bedside during the encounter. Patient was in his usual state of health when he started to feel weak 3 days ago and got worse to a point when his wife got concerned and brought him to the ED. Patient is AAOx 3 and is able to anticipate in discussion. Denies fever, chills, chest pain,  palpitations, SOB, cough, abdominal pain, nausea, vomiting, diarrhea, constipation, urinary frequency, urgency, or dysuria, headaches, changes in vision, dizziness, numbness, tingling.    ED vitals: T 97.7 ,  F, /60 , RR 18/min ,  92 O2 % on RA and then was started on 2L NC by the ED  ED labs: Sodium, Serum: 133Potassium, Serum: 4.5:Chloride, Serum: 96Lactate, Blood: 1.7  ED studies: US Duplex Venous Lower Ext Complete, Bilateral: No evidence of deep venous thrombosis in either lower extremity. CXR: b/l consolidations noted  ED Interventions: s/p ceftriaxone 1g IV, NS 500ml bolus x 2, Lokelma PO  Consult reason: Tachycardia     Allergies    penicillin (Rash)    Intolerances      Home Medications:  benazepril 10 mg oral tablet: 1 tab(s) orally once a day (09 Dec 2021 19:39)  finasteride 5 mg oral tablet: 1 tab(s) orally once a day (09 Dec 2021 19:39)  silodosin 4 mg oral capsule: 1 cap(s) orally once a day (09 Dec 2021 19:39)      PAST MEDICAL & SURGICAL HISTORY:  Hypothyroidism      HTN (hypertension)      Esophageal spasm      BPH (benign prostatic hyperplasia)      BPH (benign prostatic hyperplasia)      No significant past surgical history      FAMILY HISTORY:  :    No known cardiovascular or pulmonary family history     ROS:  See HPI     PHYSICAL EXAM    ICU Vital Signs Last 24 Hrs  T(C): 36.8 (09 Jul 2022 06:00), Max: 36.8 (09 Jul 2022 06:00)  T(F): 98.2 (09 Jul 2022 06:00), Max: 98.2 (09 Jul 2022 06:00)  HR: 118 (09 Jul 2022 06:00) (104 - 118)  BP: 109/58 (09 Jul 2022 06:00) (99/64 - 120/60)  BP(mean): --  ABP: --  ABP(mean): --  RR: 18 (09 Jul 2022 06:00) (17 - 18)  SpO2: 95% (09 Jul 2022 06:00) (92% - 95%)    O2 Parameters below as of 09 Jul 2022 06:00  Patient On (Oxygen Delivery Method): nasal cannula  O2 Flow (L/min): 2      General: Not in distress, elderly male on 2L NC speaking in full sentences  HEENT:  CARLOTA              Lymphatic system: No LN  Lungs: Bilateral rhonchi more pronounced around the bases of the lungs  Cardiovascular: Regular  Gastrointestinal: Soft, Positive BS  Musculoskeletal: No clubbing.  Moves all extremities.    Skin: Warm.  Intact  Neurological: AAOx3, generally weak appearing      07-08-22 @ 07:01  -  07-09-22 @ 06:38  --------------------------------------------------------  IN:  Total IN: 0 mL    OUT:    Intermittent Catheterization - Urethral (mL): 300 mL  Total OUT: 300 mL    Total NET: -300 mL          LABS:                          13.1   8.88  )-----------( 366      ( 09 Jul 2022 01:25 )             39.0                                               07-09    133<L>  |  96  |  54<H>  ----------------------------<  196<H>  4.5   |  20<L>  |  1.68<H>    Ca    8.5      09 Jul 2022 05:08  Mg     2.6     07-09        PT/INR - ( 09 Jul 2022 03:21 )   PT: 12.5 sec;   INR: 1.05          PTT - ( 09 Jul 2022 03:21 )  PTT:27.1 sec                                       Urinalysis Basic - ( 09 Jul 2022 01:58 )    Color: Yellow / Appearance: Clear / SG: >=1.030 / pH: x  Gluc: x / Ketone: Trace mg/dL  / Bili: Negative / Urobili: 0.2 E.U./dL   Blood: x / Protein: Trace mg/dL / Nitrite: NEGATIVE   Leuk Esterase: Small / RBC: < 5 /HPF / WBC Many /HPF   Sq Epi: x / Non Sq Epi: 0-5 /HPF / Bacteria: Many /HPF    CARDIAC MARKERS ( 09 Jul 2022 05:08 )  x     / 0.03 ng/mL / x     / x     / x                                                                                    Urinalysis with Rflx Culture (collected 09 Jul 2022 01:58)                                                MEDICATIONS  (STANDING):  heparin  Infusion.  Unit(s)/Hr (11 mL/Hr) IV Continuous <Continuous>    MEDICATIONS  (PRN):

## 2022-07-09 NOTE — ED ADULT NURSE REASSESSMENT NOTE - NS ED NURSE REASSESS COMMENT FT1
Patient education regarding brown catheter insertion done and patient verbalized understanding. Brown catheter inserted using sterile technique, draining by gravity, secured with StatLock. Second RN aKran present to confirm sterility.

## 2022-07-09 NOTE — ED PROVIDER NOTE - CARE PLAN
Principal Discharge DX:	Acute UTI  Secondary Diagnosis:	CAMILO (acute kidney injury)  Secondary Diagnosis:	Hyperkalemia   1 Principal Discharge DX:	Acute UTI  Secondary Diagnosis:	CAMILO (acute kidney injury)  Secondary Diagnosis:	Hyperkalemia  Secondary Diagnosis:	Pneumonia, aspiration   Principal Discharge DX:	Acute UTI  Secondary Diagnosis:	CAMILO (acute kidney injury)  Secondary Diagnosis:	Hyperkalemia  Secondary Diagnosis:	Pneumonia, aspiration  Secondary Diagnosis:	Multiple fractures of ribs of right side

## 2022-07-09 NOTE — ED PROVIDER NOTE - CLINICAL SUMMARY MEDICAL DECISION MAKING FREE TEXT BOX
Pt p/w gen weakness, cough. Afebrile rectally. DDx includes but not limited to infection, electrolyte / metabolic disturbances, COVID19, other RVI, other pathology. Check labs, EKG, CXR, UA. Dispo pending w/u and clinical status

## 2022-07-09 NOTE — ED ADULT NURSE REASSESSMENT NOTE - NS ED NURSE REASSESS COMMENT FT1
Pt straight cathed using sterile technique.  2 RNs present.  Pt tolerated procedure well. Sterile specimen collected. UA and Culture sent. 300CC dark yellow urine output noted.

## 2022-07-09 NOTE — ED PROVIDER NOTE - NS ED ROS FT
Constitutional: No fever or chills.   Eyes: No pain, blurry vision, or discharge.  ENMT: No hearing changes, pain, discharge or infections. No neck pain or stiffness.  Cardiac: No chest pain, SOB or edema. No chest pain with exertion.  Respiratory: See HPI  GI: No nausea, vomiting, diarrhea or abdominal pain.  : No dysuria, frequency or burning.  MS: No myalgia, muscle weakness, joint pain or back pain.  Neuro: No headache or weakness. No LOC.  Skin: No skin rash.   Endocrine: No history of thyroid disease or diabetes.  Except as documented in the HPI, all other systems are negative.

## 2022-07-09 NOTE — ED PROVIDER NOTE - SECONDARY DIAGNOSIS.
CAMILO (acute kidney injury) Hyperkalemia Pneumonia, aspiration Multiple fractures of ribs of right side

## 2022-07-09 NOTE — ED ADULT TRIAGE NOTE - CHIEF COMPLAINT QUOTE
pt bibems c/o weakness beginning today. pt usually drinks 2 big bottles of water and today did not. pt felt weak tonight prior to bed and wife was able to lower him to the floor. denies loc, hitting head/ falls.

## 2022-07-09 NOTE — ED ADULT NURSE REASSESSMENT NOTE - NS ED NURSE REASSESS COMMENT FT1
Patient refused rectal temp, risks and benefits presented to both patient and wife and verbalized understanding. MD hRodes made aware.

## 2022-07-09 NOTE — CONSULT NOTE ADULT - ASSESSMENT
Patient is a 89y old  Male PMHx HTN, hypothyroidism, peripheral neuropathy (onset 3 years ago after spinal surgery), esophageal spasms, BPH presenting with c/o weakness and poor PO intake for the last 3 days. Based on Imaging patient is admitted for aspiration PNA and UTI  (asymptomatic).    #Possible acute on chronic Aspiration PNA based on imaging   CTPE: Unofficial read, no PE. lower lobe consolidations noted b/l   - c/w ceftriaxone 1g IV [id number]  - Obtain legionalle PNA, Strep PNA  - Obtain MRSA/MSSA swabs  - If spike fever obtain blood cultures   - f/u urine cx  - Tylenol PRN  - f/u CTPE - can d/c heparin gtt if negative for PE     #Hx of chronic UTI- self catheterizes  - c/w abx as above   - f/u urine cx      #Sinus Tachycardia possibly 2/2 dehydration due to poor PO intake  Denied chest pain or palpitations  s/p 1L NS in the ED  - c/w IVF hydration  - Obtain TTE     #Troponemia- denied chest pain with no ischemic changes  - continue to trend troponin to peak   - Obtain EKG if c/o chest pain     #Has Hx of Pulmonary embolism.   was on Eliquis    {[id number]}#BPH (benign prostatic hyperplasia).   ·  Plan: Patient with history of BPH on home finasteride 5 mg and Silodosin 4 mg.   - c/w home meds    - c/w home finasteride 5 mg  - f/u with pharmacy about appropriate tamsulosin conversion dose, then c/w tamsulosin while inpatient.    {[id number]}#HTN (hypertension).   Patient with history of HTN on benazepril 10 mg q daily as per chart review. denied any anti HTN as of now   {[id number]}    {[id number]}DISPO: [facility]     d/w Dr. Torres

## 2022-07-09 NOTE — ED ADULT NURSE NOTE - OBJECTIVE STATEMENT
Received via stretcher BIBEMS with chief complaints of weakness beginning 3 hours PTA. Per patient, usually drinks 2 big bottles of water however did not today. Patinet states "I feel dehydrated." Per wife, tonight prior bed- wife able to lower patient to the floor. Denies dizziness, hitting head, chest and abdominal pain.     Patient AOX3, speaking full sentences, family @  bedside.  +PERRLA.  Patient denies chest pain, shortness of breath, difficulty breathing and any form of distress not noted. Resps even and nonlabored. Moves all extremities. No obvious trauma/injury/deformity noted. Patient oriented to ED area. All needs attended. POC reviewed. Placed on continous cardiac monitoring. Fall risk precautions maintained. Purposeful proactive hourly rounding in progress.

## 2022-07-11 LAB
-  AMPICILLIN/SULBACTAM: SIGNIFICANT CHANGE UP
-  AMPICILLIN: SIGNIFICANT CHANGE UP
-  CEFAZOLIN: SIGNIFICANT CHANGE UP
-  CEFTRIAXONE: SIGNIFICANT CHANGE UP
-  CIPROFLOXACIN: SIGNIFICANT CHANGE UP
-  ERTAPENEM: SIGNIFICANT CHANGE UP
-  GENTAMICIN: SIGNIFICANT CHANGE UP
-  NITROFURANTOIN: SIGNIFICANT CHANGE UP
-  PIPERACILLIN/TAZOBACTAM: SIGNIFICANT CHANGE UP
-  TOBRAMYCIN: SIGNIFICANT CHANGE UP
-  TRIMETHOPRIM/SULFAMETHOXAZOLE: SIGNIFICANT CHANGE UP
CULTURE RESULTS: SIGNIFICANT CHANGE UP
METHOD TYPE: SIGNIFICANT CHANGE UP
ORGANISM # SPEC MICROSCOPIC CNT: SIGNIFICANT CHANGE UP
ORGANISM # SPEC MICROSCOPIC CNT: SIGNIFICANT CHANGE UP
SPECIMEN SOURCE: SIGNIFICANT CHANGE UP

## 2022-07-14 LAB
CULTURE RESULTS: SIGNIFICANT CHANGE UP
CULTURE RESULTS: SIGNIFICANT CHANGE UP
SPECIMEN SOURCE: SIGNIFICANT CHANGE UP
SPECIMEN SOURCE: SIGNIFICANT CHANGE UP

## 2022-11-11 ENCOUNTER — EMERGENCY (EMERGENCY)
Facility: HOSPITAL | Age: 87
LOS: 1 days | Discharge: HOME | End: 2022-11-11
Attending: STUDENT IN AN ORGANIZED HEALTH CARE EDUCATION/TRAINING PROGRAM | Admitting: STUDENT IN AN ORGANIZED HEALTH CARE EDUCATION/TRAINING PROGRAM
Payer: MEDICARE

## 2022-11-11 VITALS
DIASTOLIC BLOOD PRESSURE: 77 MMHG | HEIGHT: 71 IN | HEART RATE: 76 BPM | WEIGHT: 134.92 LBS | SYSTOLIC BLOOD PRESSURE: 127 MMHG | RESPIRATION RATE: 18 BRPM | OXYGEN SATURATION: 99 % | TEMPERATURE: 98 F

## 2022-11-11 LAB
ALBUMIN SERPL ELPH-MCNC: 3.7 G/DL — SIGNIFICANT CHANGE UP (ref 3.3–5)
ALP SERPL-CCNC: 91 U/L — SIGNIFICANT CHANGE UP (ref 40–120)
ALT FLD-CCNC: 11 U/L — SIGNIFICANT CHANGE UP (ref 10–45)
ANION GAP SERPL CALC-SCNC: 9 MMOL/L — SIGNIFICANT CHANGE UP (ref 5–17)
APPEARANCE UR: ABNORMAL
APTT BLD: 30.6 SEC — SIGNIFICANT CHANGE UP (ref 27.5–35.5)
AST SERPL-CCNC: 13 U/L — SIGNIFICANT CHANGE UP (ref 10–40)
BACTERIA # UR AUTO: ABNORMAL /HPF
BASOPHILS # BLD AUTO: 0.03 K/UL — SIGNIFICANT CHANGE UP (ref 0–0.2)
BASOPHILS NFR BLD AUTO: 0.2 % — SIGNIFICANT CHANGE UP (ref 0–2)
BILIRUB SERPL-MCNC: 0.3 MG/DL — SIGNIFICANT CHANGE UP (ref 0.2–1.2)
BILIRUB UR-MCNC: NEGATIVE — SIGNIFICANT CHANGE UP
BLD GP AB SCN SERPL QL: NEGATIVE — SIGNIFICANT CHANGE UP
BUN SERPL-MCNC: 27 MG/DL — HIGH (ref 7–23)
CALCIUM SERPL-MCNC: 9.5 MG/DL — SIGNIFICANT CHANGE UP (ref 8.4–10.5)
CHLORIDE SERPL-SCNC: 100 MMOL/L — SIGNIFICANT CHANGE UP (ref 96–108)
CO2 SERPL-SCNC: 28 MMOL/L — SIGNIFICANT CHANGE UP (ref 22–31)
COLOR SPEC: YELLOW — SIGNIFICANT CHANGE UP
COMMENT - URINE: SIGNIFICANT CHANGE UP
CREAT SERPL-MCNC: 0.89 MG/DL — SIGNIFICANT CHANGE UP (ref 0.5–1.3)
DIFF PNL FLD: ABNORMAL
EGFR: 81 ML/MIN/1.73M2 — SIGNIFICANT CHANGE UP
EOSINOPHIL # BLD AUTO: 0.01 K/UL — SIGNIFICANT CHANGE UP (ref 0–0.5)
EOSINOPHIL NFR BLD AUTO: 0.1 % — SIGNIFICANT CHANGE UP (ref 0–6)
EPI CELLS # UR: SIGNIFICANT CHANGE UP /HPF (ref 0–5)
GLUCOSE SERPL-MCNC: 89 MG/DL — SIGNIFICANT CHANGE UP (ref 70–99)
GLUCOSE UR QL: NEGATIVE — SIGNIFICANT CHANGE UP
HCT VFR BLD CALC: 39.8 % — SIGNIFICANT CHANGE UP (ref 39–50)
HGB BLD-MCNC: 12.4 G/DL — LOW (ref 13–17)
IMM GRANULOCYTES NFR BLD AUTO: 0.4 % — SIGNIFICANT CHANGE UP (ref 0–0.9)
INR BLD: 1.14 — SIGNIFICANT CHANGE UP (ref 0.88–1.16)
KETONES UR-MCNC: NEGATIVE — SIGNIFICANT CHANGE UP
LEUKOCYTE ESTERASE UR-ACNC: ABNORMAL
LIDOCAIN IGE QN: 73 U/L — HIGH (ref 7–60)
LYMPHOCYTES # BLD AUTO: 1.08 K/UL — SIGNIFICANT CHANGE UP (ref 1–3.3)
LYMPHOCYTES # BLD AUTO: 8.7 % — LOW (ref 13–44)
MCHC RBC-ENTMCNC: 25.8 PG — LOW (ref 27–34)
MCHC RBC-ENTMCNC: 31.2 GM/DL — LOW (ref 32–36)
MCV RBC AUTO: 82.7 FL — SIGNIFICANT CHANGE UP (ref 80–100)
MONOCYTES # BLD AUTO: 1.17 K/UL — HIGH (ref 0–0.9)
MONOCYTES NFR BLD AUTO: 9.4 % — SIGNIFICANT CHANGE UP (ref 2–14)
NEUTROPHILS # BLD AUTO: 10.05 K/UL — HIGH (ref 1.8–7.4)
NEUTROPHILS NFR BLD AUTO: 81.2 % — HIGH (ref 43–77)
NITRITE UR-MCNC: POSITIVE
NRBC # BLD: 0 /100 WBCS — SIGNIFICANT CHANGE UP (ref 0–0)
PH UR: 7.5 — SIGNIFICANT CHANGE UP (ref 5–8)
PLATELET # BLD AUTO: 283 K/UL — SIGNIFICANT CHANGE UP (ref 150–400)
POTASSIUM SERPL-MCNC: 4.7 MMOL/L — SIGNIFICANT CHANGE UP (ref 3.5–5.3)
POTASSIUM SERPL-SCNC: 4.7 MMOL/L — SIGNIFICANT CHANGE UP (ref 3.5–5.3)
PROT SERPL-MCNC: 8 G/DL — SIGNIFICANT CHANGE UP (ref 6–8.3)
PROT UR-MCNC: 30 MG/DL
PROTHROM AB SERPL-ACNC: 13.6 SEC — HIGH (ref 10.5–13.4)
RBC # BLD: 4.81 M/UL — SIGNIFICANT CHANGE UP (ref 4.2–5.8)
RBC # FLD: 16.1 % — HIGH (ref 10.3–14.5)
RBC CASTS # UR COMP ASSIST: < 5 /HPF — SIGNIFICANT CHANGE UP
RH IG SCN BLD-IMP: POSITIVE — SIGNIFICANT CHANGE UP
SARS-COV-2 RNA SPEC QL NAA+PROBE: NEGATIVE — SIGNIFICANT CHANGE UP
SODIUM SERPL-SCNC: 137 MMOL/L — SIGNIFICANT CHANGE UP (ref 135–145)
SP GR SPEC: 1.01 — SIGNIFICANT CHANGE UP (ref 1–1.03)
UROBILINOGEN FLD QL: 1 E.U./DL — SIGNIFICANT CHANGE UP
WBC # BLD: 12.39 K/UL — HIGH (ref 3.8–10.5)
WBC # FLD AUTO: 12.39 K/UL — HIGH (ref 3.8–10.5)
WBC UR QL: ABNORMAL /HPF

## 2022-11-11 PROCEDURE — 87186 SC STD MICRODIL/AGAR DIL: CPT

## 2022-11-11 PROCEDURE — 87086 URINE CULTURE/COLONY COUNT: CPT

## 2022-11-11 PROCEDURE — 86900 BLOOD TYPING SEROLOGIC ABO: CPT

## 2022-11-11 PROCEDURE — 99284 EMERGENCY DEPT VISIT MOD MDM: CPT

## 2022-11-11 PROCEDURE — 81001 URINALYSIS AUTO W/SCOPE: CPT

## 2022-11-11 PROCEDURE — 86901 BLOOD TYPING SEROLOGIC RH(D): CPT

## 2022-11-11 PROCEDURE — 83690 ASSAY OF LIPASE: CPT

## 2022-11-11 PROCEDURE — 85610 PROTHROMBIN TIME: CPT

## 2022-11-11 PROCEDURE — 80053 COMPREHEN METABOLIC PANEL: CPT

## 2022-11-11 PROCEDURE — 87635 SARS-COV-2 COVID-19 AMP PRB: CPT

## 2022-11-11 PROCEDURE — 36415 COLL VENOUS BLD VENIPUNCTURE: CPT

## 2022-11-11 PROCEDURE — 85025 COMPLETE CBC W/AUTO DIFF WBC: CPT

## 2022-11-11 PROCEDURE — 85730 THROMBOPLASTIN TIME PARTIAL: CPT

## 2022-11-11 PROCEDURE — 86850 RBC ANTIBODY SCREEN: CPT

## 2022-11-11 RX ORDER — CEFTRIAXONE 500 MG/1
1000 INJECTION, POWDER, FOR SOLUTION INTRAMUSCULAR; INTRAVENOUS ONCE
Refills: 0 | Status: DISCONTINUED | OUTPATIENT
Start: 2022-11-11 | End: 2022-11-11

## 2022-11-11 RX ORDER — CEFDINIR 250 MG/5ML
1 POWDER, FOR SUSPENSION ORAL
Qty: 20 | Refills: 0
Start: 2022-11-11 | End: 2022-11-20

## 2022-11-11 RX ORDER — AZTREONAM 2 G
1 VIAL (EA) INJECTION
Qty: 28 | Refills: 0
Start: 2022-11-11 | End: 2022-11-24

## 2022-11-11 NOTE — ED PROVIDER NOTE - PHYSICAL EXAMINATION
General: comfortable, resting in ED  HEENT: atraumatic, no eye erythema or discharge  Pulm: no cyanosis, no added work of breathing  Cardiac: extremities warm, intact peripheral pulse  GI: no abdominal distension, abdomen nontender  (with RN Candido): soft brown stool without blood in rectal vault, gross hematuria from urethral meatus and blood in diaper  Neuro: alert, conversant  Psych: neutral affect, cooperative  Msk: no gross deformity or instability  Skin: no erythema or rash

## 2022-11-11 NOTE — ED ADULT TRIAGE NOTE - OTHER COMPLAINTS
patient presents with hematuria and dark stook x 1 day-- on eliquis, reports having urinary catheter-- fever tmax 100.0F-- denies abdominal pain/SOB

## 2022-11-11 NOTE — ED ADULT NURSE NOTE - OBJECTIVE STATEMENT
.  89years male alert mental state (AOX3) received on wheelchair.  -Allergy: penicillin.  -complain of hematuria.  Hx of HTN. pt has on/off hematuria and dark stool since last night. pt states that he had fever (100.0F) yesterday and he took Eliquis and stopped taking it 3 days ago. pt presents no complain.  -denied chest pain, SOB, dizziness, headache, fever, n/v/d, abdomen pain.  Pt is in the bed comfortably at this time. Will continue to monitor and document any changes.      patient presents with hematuria and dark stook x 1 day-- on eliquis, reports having urinary catheter-- fever tmax 100.0F-- denies abdominal pain/SOB

## 2022-11-11 NOTE — ED PROVIDER NOTE - PROGRESS NOTE DETAILS
Straight cath with clear yellow urine, no concern for gross hematuria but instead with UA + nitrites/leuk esterase concerning for complex UTI, urology consult no longer indicated, will give ceftriaxone, likely admit for further workup and management. Discussed inpatient vs outpatient management of UTI with patient.  Patient and family would strongly prefer outpatient management.  Given patient is alert, hemodynamically stable, and has help with wife at home, this is medically reasonable.  Patient has urology followup at [place].  Plan to discharge home with return precautions and outpatient followup. Family does not want bactrim.  Will prescribe cefdinir instead.  Patient with "possible" unknown PCN allergy, however low cross reactivity and mild/unknown reaction for penicillin.

## 2022-11-11 NOTE — ED PROVIDER NOTE - PATIENT PORTAL LINK FT
You can access the FollowMyHealth Patient Portal offered by [facility] by registering at the following website: [email or web address]. By joining [employer]’s FollowMyHealth portal, you will also be able to view your health information using other applications (apps) compatible with our system.

## 2022-11-11 NOTE — ED PROVIDER NOTE - OBJECTIVE STATEMENT
89M with HTN hypothyroid, esophageal spasm, hx PE on eliquis, BPH with self cath for urinary retention, now with 2-3 days of gross hematuria and dark stool, associated with diarrhea, stopped eliquis approximately 3 days ago.

## 2022-11-11 NOTE — ED PROVIDER NOTE - NSICDXPASTMEDICALHX_GEN_ALL_CORE_FT
PAST MEDICAL HISTORY:  BPH (benign prostatic hyperplasia)     BPH (benign prostatic hyperplasia)     Esophageal spasm     HTN (hypertension)     Hypothyroidism

## 2022-11-11 NOTE — ED PROVIDER NOTE - NSFOLLOWUPCLINICS_GEN_ALL_ED_FT
[facility]  Family Medicine  [address] [place] [address]  Phone: [phone]  Fax:     [facility]  Urology  [address] [place] [address]  Phone: [phone]  Fax:

## 2022-11-11 NOTE — ED PROVIDER NOTE - CLINICAL SUMMARY MEDICAL DECISION MAKING FREE TEXT BOX
S/p physical exam, presentation more concerning for  bleed rather than rectal/GI bleed.  Will attempt straight cath for urine sample ?degree of hematuria ?presence of clots.  R/o UTI.  R/o significant anemia given  and possible rectal bleeding.  Discussed case with urology consult team, team will come evaluate s/p straight cath.

## 2022-11-14 ENCOUNTER — EMERGENCY (EMERGENCY)
Facility: HOSPITAL | Age: 87
LOS: 1 days | Discharge: HOME | End: 2022-11-14
Attending: EMERGENCY MEDICINE | Admitting: EMERGENCY MEDICINE
Payer: MEDICARE

## 2022-11-14 VITALS
WEIGHT: 134.92 LBS | HEART RATE: 93 BPM | RESPIRATION RATE: 16 BRPM | TEMPERATURE: 98 F | DIASTOLIC BLOOD PRESSURE: 75 MMHG | OXYGEN SATURATION: 97 % | HEIGHT: 71 IN | SYSTOLIC BLOOD PRESSURE: 162 MMHG
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
## 2022-11-14 DIAGNOSIS — Z87.448 PERSONAL HISTORY OF OTHER DISEASES OF URINARY SYSTEM: ICD-10-CM
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
## 2022-11-14 DIAGNOSIS — I10 ESSENTIAL (PRIMARY) HYPERTENSION: ICD-10-CM

## 2022-11-14 DIAGNOSIS — N40.0 BENIGN PROSTATIC HYPERPLASIA WITHOUT LOWER URINARY TRACT SYMPTOMS: ICD-10-CM

## 2022-11-14 DIAGNOSIS — Z90.49 ACQUIRED ABSENCE OF OTHER SPECIFIED PARTS OF DIGESTIVE TRACT: ICD-10-CM

## 2022-11-14 DIAGNOSIS — S22.42XA MULTIPLE FRACTURES OF RIBS, LEFT SIDE, INITIAL ENCOUNTER FOR CLOSED FRACTURE: ICD-10-CM

## 2022-11-14 DIAGNOSIS — E03.9 HYPOTHYROIDISM, UNSPECIFIED: ICD-10-CM

## 2022-11-14 DIAGNOSIS — Z79.01 LONG TERM (CURRENT) USE OF ANTICOAGULANTS: ICD-10-CM

## 2022-11-14 DIAGNOSIS — R33.9 RETENTION OF URINE, UNSPECIFIED: ICD-10-CM

## 2022-11-14 DIAGNOSIS — Z86.711 PERSONAL HISTORY OF PULMONARY EMBOLISM: ICD-10-CM

## 2022-11-14 DIAGNOSIS — M25.512 PAIN IN LEFT SHOULDER: ICD-10-CM
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
## 2022-11-14 DIAGNOSIS — Z20.822 CONTACT WITH AND (SUSPECTED) EXPOSURE TO COVID-19: ICD-10-CM

## 2022-11-14 DIAGNOSIS — R31.9 HEMATURIA, UNSPECIFIED: ICD-10-CM

## 2022-11-14 DIAGNOSIS — S60.052A CONTUSION OF LEFT LITTLE FINGER WITHOUT DAMAGE TO NAIL, INITIAL ENCOUNTER: ICD-10-CM

## 2022-11-14 DIAGNOSIS — S42.402A UNSPECIFIED FRACTURE OF LOWER END OF LEFT HUMERUS, INITIAL ENCOUNTER FOR CLOSED FRACTURE: ICD-10-CM
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
## 2022-11-14 DIAGNOSIS — Z87.19 PERSONAL HISTORY OF OTHER DISEASES OF THE DIGESTIVE SYSTEM: ICD-10-CM

## 2022-11-14 DIAGNOSIS — Z88.0 ALLERGY STATUS TO PENICILLIN: ICD-10-CM

## 2022-11-14 DIAGNOSIS — M25.552 PAIN IN LEFT HIP: ICD-10-CM

## 2022-11-14 DIAGNOSIS — Z93.1 GASTROSTOMY STATUS: ICD-10-CM

## 2022-11-14 LAB
ALBUMIN SERPL ELPH-MCNC: 3.8 G/DL — SIGNIFICANT CHANGE UP (ref 3.3–5)
ALP SERPL-CCNC: 78 U/L — SIGNIFICANT CHANGE UP (ref 40–120)
ALT FLD-CCNC: 20 U/L — SIGNIFICANT CHANGE UP (ref 10–45)
ANION GAP SERPL CALC-SCNC: 8 MMOL/L — SIGNIFICANT CHANGE UP (ref 5–17)
AST SERPL-CCNC: 26 U/L — SIGNIFICANT CHANGE UP (ref 10–40)
BASOPHILS # BLD AUTO: 0 K/UL — SIGNIFICANT CHANGE UP (ref 0–0.2)
BASOPHILS NFR BLD AUTO: 0 % — SIGNIFICANT CHANGE UP (ref 0–2)
BILIRUB SERPL-MCNC: 0.2 MG/DL — SIGNIFICANT CHANGE UP (ref 0.2–1.2)
BLD GP AB SCN SERPL QL: NEGATIVE — SIGNIFICANT CHANGE UP
BUN SERPL-MCNC: 28 MG/DL — HIGH (ref 7–23)
CALCIUM SERPL-MCNC: 9 MG/DL — SIGNIFICANT CHANGE UP (ref 8.4–10.5)
CHLORIDE SERPL-SCNC: 100 MMOL/L — SIGNIFICANT CHANGE UP (ref 96–108)
CO2 SERPL-SCNC: 29 MMOL/L — SIGNIFICANT CHANGE UP (ref 22–31)
CREAT SERPL-MCNC: 0.86 MG/DL — SIGNIFICANT CHANGE UP (ref 0.5–1.3)
EGFR: 82 ML/MIN/1.73M2 — SIGNIFICANT CHANGE UP
EOSINOPHIL # BLD AUTO: 0.1 K/UL — SIGNIFICANT CHANGE UP (ref 0–0.5)
EOSINOPHIL NFR BLD AUTO: 0.9 % — SIGNIFICANT CHANGE UP (ref 0–6)
GLUCOSE SERPL-MCNC: 95 MG/DL — SIGNIFICANT CHANGE UP (ref 70–99)
HCT VFR BLD CALC: 38.6 % — LOW (ref 39–50)
HGB BLD-MCNC: 12 G/DL — LOW (ref 13–17)
LYMPHOCYTES # BLD AUTO: 1.01 K/UL — SIGNIFICANT CHANGE UP (ref 1–3.3)
LYMPHOCYTES # BLD AUTO: 8.8 % — LOW (ref 13–44)
MANUAL SMEAR VERIFICATION: SIGNIFICANT CHANGE UP
MCHC RBC-ENTMCNC: 25.4 PG — LOW (ref 27–34)
MCHC RBC-ENTMCNC: 31.1 GM/DL — LOW (ref 32–36)
MCV RBC AUTO: 81.8 FL — SIGNIFICANT CHANGE UP (ref 80–100)
METAMYELOCYTES # FLD: 0.9 % — HIGH (ref 0–0)
MONOCYTES # BLD AUTO: 1.1 K/UL — HIGH (ref 0–0.9)
MONOCYTES NFR BLD AUTO: 9.6 % — SIGNIFICANT CHANGE UP (ref 2–14)
MYELOCYTES NFR BLD: 0.9 % — HIGH (ref 0–0)
NEUTROPHILS # BLD AUTO: 8.24 K/UL — HIGH (ref 1.8–7.4)
NEUTROPHILS NFR BLD AUTO: 70.2 % — SIGNIFICANT CHANGE UP (ref 43–77)
NEUTS BAND # BLD: 1.7 % — SIGNIFICANT CHANGE UP (ref 0–8)
PLAT MORPH BLD: NORMAL — SIGNIFICANT CHANGE UP
PLATELET # BLD AUTO: 242 K/UL — SIGNIFICANT CHANGE UP (ref 150–400)
POTASSIUM SERPL-MCNC: 5.1 MMOL/L — SIGNIFICANT CHANGE UP (ref 3.5–5.3)
POTASSIUM SERPL-SCNC: 5.1 MMOL/L — SIGNIFICANT CHANGE UP (ref 3.5–5.3)
PROT SERPL-MCNC: 7.6 G/DL — SIGNIFICANT CHANGE UP (ref 6–8.3)
RBC # BLD: 4.72 M/UL — SIGNIFICANT CHANGE UP (ref 4.2–5.8)
RBC # FLD: 16.3 % — HIGH (ref 10.3–14.5)
RBC BLD AUTO: NORMAL — SIGNIFICANT CHANGE UP
RH IG SCN BLD-IMP: POSITIVE — SIGNIFICANT CHANGE UP
SARS-COV-2 RNA SPEC QL NAA+PROBE: NEGATIVE — SIGNIFICANT CHANGE UP
SMUDGE CELLS # BLD: PRESENT — SIGNIFICANT CHANGE UP
SODIUM SERPL-SCNC: 137 MMOL/L — SIGNIFICANT CHANGE UP (ref 135–145)
VARIANT LYMPHS # BLD: 7 % — HIGH (ref 0–6)
WBC # BLD: 11.46 K/UL — HIGH (ref 3.8–10.5)
WBC # FLD AUTO: 11.46 K/UL — HIGH (ref 3.8–10.5)

## 2022-11-14 PROCEDURE — 73080 X-RAY EXAM OF ELBOW: CPT

## 2022-11-14 PROCEDURE — 72192 CT PELVIS W/O DYE: CPT | Mod: MG

## 2022-11-14 PROCEDURE — G1004: CPT

## 2022-11-14 PROCEDURE — 99284 EMERGENCY DEPT VISIT MOD MDM: CPT

## 2022-11-14 PROCEDURE — 73080 X-RAY EXAM OF ELBOW: CPT | Mod: 26,LT

## 2022-11-14 PROCEDURE — 73502 X-RAY EXAM HIP UNI 2-3 VIEWS: CPT | Mod: 26,LT

## 2022-11-14 PROCEDURE — 86901 BLOOD TYPING SEROLOGIC RH(D): CPT

## 2022-11-14 PROCEDURE — 36415 COLL VENOUS BLD VENIPUNCTURE: CPT

## 2022-11-14 PROCEDURE — 86850 RBC ANTIBODY SCREEN: CPT

## 2022-11-14 PROCEDURE — 80053 COMPREHEN METABOLIC PANEL: CPT

## 2022-11-14 PROCEDURE — 73502 X-RAY EXAM HIP UNI 2-3 VIEWS: CPT

## 2022-11-14 PROCEDURE — 73060 X-RAY EXAM OF HUMERUS: CPT

## 2022-11-14 PROCEDURE — 73030 X-RAY EXAM OF SHOULDER: CPT

## 2022-11-14 PROCEDURE — 71045 X-RAY EXAM CHEST 1 VIEW: CPT | Mod: 26

## 2022-11-14 PROCEDURE — 73030 X-RAY EXAM OF SHOULDER: CPT | Mod: 26

## 2022-11-14 PROCEDURE — 73140 X-RAY EXAM OF FINGER(S): CPT

## 2022-11-14 PROCEDURE — 73060 X-RAY EXAM OF HUMERUS: CPT | Mod: 26,LT

## 2022-11-14 PROCEDURE — 72192 CT PELVIS W/O DYE: CPT | Mod: 26,MG

## 2022-11-14 PROCEDURE — 86900 BLOOD TYPING SEROLOGIC ABO: CPT

## 2022-11-14 PROCEDURE — 73140 X-RAY EXAM OF FINGER(S): CPT | Mod: 26,LT

## 2022-11-14 PROCEDURE — 71045 X-RAY EXAM CHEST 1 VIEW: CPT

## 2022-11-14 PROCEDURE — 99284 EMERGENCY DEPT VISIT MOD MDM: CPT | Mod: 25

## 2022-11-14 PROCEDURE — 87635 SARS-COV-2 COVID-19 AMP PRB: CPT

## 2022-11-14 PROCEDURE — 85025 COMPLETE CBC W/AUTO DIFF WBC: CPT

## 2022-11-14 RX ORDER — ACETAMINOPHEN 500 MG
1000 TABLET ORAL ONCE
Refills: 0 | Status: DISCONTINUED | OUTPATIENT
Start: 2022-11-14 | End: 2022-11-14

## 2022-11-14 NOTE — ED PROVIDER NOTE - PROGRESS NOTE DETAILS
pt w/ full capacity. offered but declined finger splint for poss sprain/strain. arm sling provided for poss L elbow fx. aware of rib fx. pain controlled. declining analgesia. no resp sx. spirometer provided. ct pelvis w/o acute fx/dislocation but still unable to walk w/o assistance. recommended admission for poss mri and pt evaluation however pt/wife declining. also offered admission given pt unable to use LUE for walker. again pt/wife declining. state they already have pt and can get additional help at home. requesting to be discharged. strict return precautions. pt w/ full capacity. offered but declined finger splint for poss sprain/strain. arm sling provided for poss L elbow fx. aware of rib fx. pain controlled. declining analgesia. no resp sx. spirometer provided. ct pelvis w/o acute fx/dislocation but still unable to walk w/o assistance. recommended admission for poss mri and pt evaluation however pt/wife declining. also offered admission given pt unable to use LUE for walker. again pt/wife declining. state they already have pt and can get additional help at home. understands risk of worsening disease. requesting to be discharged. strict return precautions. xrays reviewed w/ radiology. found to have poss L elbow fx and L lateral 2nd/4th/5th rib fx on cxr w/o ptx/hemothorax.

## 2022-11-14 NOTE — ED PROVIDER NOTE - PHYSICAL EXAMINATION
+ttp bl lateral chest wall w/o crepitus/deformity/skin changes  +ttp diffuse nonfocal L proximal humerus  nttp L elbow but w/ assoc ecchymoses  +ttp L 5th digit PIP w/ assoc ecchymoses CONST: nontoxic NAD speaking in full sentences lying comfortably w/ legs crossed  HEAD: atraumatic  EYES: conjunctivae clear, PERRL, EOMI, no racoon eyes  ENT: mmm, no smith sign, no clear rhinorrhea, no hemotympanum  NECK: supple/FROM, no midline ttp, no jvd, trachea midline  CARD: rrr no murmurs, +ttp overlying bl upper lateral chest walls w/o assoc skin changes/crepitus/deformity  CHEST: ctab no r/r/w, no stridor/retractions/tripoding  ABD: soft, nd, nttp, no rebound/guarding  PELVIS: stable, +mild ttp overlying L lateral hip, no pain w/ axial load/log roll  BACK: no midline ttp/stepoff/deformity, no flank ecchymoses  EXT: +ttp diffuse nonfocal L proximal humerus, nttp L elbow but w/ assoc ecchymoses,   +ttp L 5th digit PIP w/ assoc ecchymoses, compartments soft, FROM, symmetric distal pulses intact  SKIN: warm, dry, no rash, cap refill <2sec  NEURO: a+ox3, CN II-XII intact, 5/5 strength x4, gross sensation intact x4

## 2022-11-14 NOTE — ED PROVIDER NOTE - WR INTERPRETATION 3
poss anterior sail sign. lucency overlying lateral condyle. sts. poss underlying acute fx. no dislocation.
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
## 2022-11-14 NOTE — ED PROVIDER NOTE - IV ALTEPLASE INCLUSION HIDDEN
Antibiotics were prescribed today. Please follow instructions carefully. Given with food unless instructed otherwise. Normal side effects can be loose stools. Push starchy  foods,  bananas and yogurt to help limit this. Have your child drink lots of fluids.Notify us if you see any rashes. He or she may have Tylenol or ibuprofen products for fever or pain for the next 24-48 hours. Symptoms should improve within 48-72 hours.   Please give us a call if they're not improving by that time or if they worsen at any point.    Your child has been diagnosed with strep throat. Antibiotics have been prescribed. Be sure to finish out the entire course , even if Kim Elder is feeling better. Call if symptoms are not improving or if symptoms worsen. Side effects of the antibiotic to watch for are rash (Hives), loose stools or diarrhea.     Kim Elder should stay home from school, day care , or other activities for 24 hours. After that she is no longer considered infectious. After 24 hours you should either run the toothbrush through the  or get a new one for Kim Elder.    Please call with any questions.  
show

## 2022-11-14 NOTE — ED PROVIDER NOTE - CARE PLAN
1 Principal Discharge DX:	Rib fracture  Secondary Diagnosis:	Elbow fracture  Secondary Diagnosis:	Left hip pain

## 2022-11-14 NOTE — ED ADULT NURSE NOTE - OBJECTIVE STATEMENT
fell down injury, loss balance, is complaining of Lt. side pelvic, leg, arm, wrist. denies chest pain, sob, dizziness, headache, A&Ox3. No distress. stable at bed
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
## 2022-11-14 NOTE — ED PROVIDER NOTE - NSFOLLOWUPINSTRUCTIONS_ED_ALL_ED_FT
TYLENOL AS NEEDED FOR PAIN. ARM SLING AT ALL TIMES UNTIL CLEARED BY ORTHOPAEDICS. MAY ICE FINGER AS TOLERATED (20MIN ON/OFF). RECOMMEND SPIROMETER USE EVERY 2HR AS TOLERATED TO HELP OPEN UP LUNGS SINCE YOU HAVE 3 RIB FRACTURES. YOU UNDERSTAND THERE MAY STILL BE AN OCCULT HIP FRACTURE NOT SEEN ON CT. GIVEN YOU ARE DECLINING ADMISSION AT THIS TIME, RECOMMEND OUTPATIENT MRI IF PERSISTENT PAIN.    PLEASE FOLLOW-UP WITH YOUR PCP IN 1-2 DAYS REGARDING YOUR RIB FRACTURES.    PLEASE FOLLOW-UP WITH ORTHOPAEDICS IN 1-2 DAYS REGARDING YOUR FINGER, ELBOW AND HIP PAIN.    ANY IMAGING RESULTS GIVEN IN THE EMERGENCY DEPARTMENT ARE PRELIMINARY UNTIL FORMALLY READ BY A RADIOLOGIST. YOU WILL BE CONTACTED IF THERE ARE ANY SIGNIFICANT CHANGES IN THE FINAL READ.    PLEASE RETURN TO THE EMERGENCY DEPARTMENT IF FEVER, CHEST PAIN, SHORTNESS OF BREATH, ABDOMINAL PAIN, VOMITING, OTHER CONCERNING SYMPTOMS.    PLEASE CONTACT SARAH WATSON ([facility] EMERGENCY DEPARTMENT CLINICAL REFERRAL COORDINATOR) TO ASSIST IN SCHEDULING YOUR FOLLOW-UP APPOINTMENT.    Monday - Friday 11am-7pm  [phone]  [email or web address]

## 2022-11-14 NOTE — ED ADULT NURSE REASSESSMENT NOTE - NS ED NURSE REASSESS COMMENT FT1
received patient at 2015...patient per morning primary rn refused iv or blood work. Significant other at bedside states "he just had blood work recently the hospital should use that blood work." Blood work ordered by provider, notified ed provider patient and his significant other does not want blood work done and states it was just done recently. Ed provider spoke with the patient whom now agrees to blood work. When attempting to draw blood work I placed a 22g right forearm and patients significant other stated "why you need to put the iv there he needs to use his arm, take it out I don't want it there and tell the doctor he does not need blood work it was just done." Educated significant other that ed provider spoke with patient that the blood work was done friday and it to many days that it needs to be repeated. Patient and significant other asked where would be most appropriate and comfort for them for me to place ivl. 20g placed in right a/c blood work obtained and then significant other demanded the iv be removed. Patient's significant other states "Its not necessary for you to leave that there." Ivl was removed, notified ed provider patient's significant other requested ivl to be removed.

## 2022-11-14 NOTE — ED PROVIDER NOTE - CARE PROVIDER_API CALL
Ray Antonio)  Orthopaedic Surgery  [address] [place] [address]  Phone: [phone]  Fax: [phone]  Follow Up Time: Urgent

## 2022-11-14 NOTE — ED PROVIDER NOTE - CLINICAL SUMMARY MEDICAL DECISION MAKING FREE TEXT BOX
avss. nontoxic. NAD. no systemic sx. found to have poss L elbow fx on xray. s/p arm sling. also found to have 3 minimally displaced rib fx on cxr w/o assoc ptx vs hemothorax. s/p spirometer. L finger xray w/o acute fx/dislocation. offered but declined splint for comfort. ct pelvis w/o acute fx/dislocation however pt still unable to ambulate w/o assistance. cannot exclude underlying occult fx. will require mri if persistent pain or unable to ambulate. pt w/ full capacity. offered but declined admission for poss mri and/or pt evaluation. pt/wife state they already have pt and can get additional help at home. understands risk of worsening disease. requesting to be discharged. strict return precautions. avss. nontoxic. NAD. no systemic sx. found to have poss L elbow fx on xray. s/p arm sling. also found to have 3 minimally displaced rib fx on cxr w/o assoc ptx vs hemothorax. s/p spirometer. L finger xray w/o acute fx/dislocation. offered but declined splint for comfort. ct pelvis w/o acute fx/dislocation however pt still unable to ambulate w/o assistance. cannot exclude underlying occult fx. will require mri if persistent pain or unable to ambulate. pt w/ full capacity. offered but declined admission for poss mri and/or pt evaluation. pt/wife state they already have pt and can get additional help at home. understands risk of worsening disease. requesting to be discharged. will dc w/ outpatient ortho fu. strict return precautions. avss. nontoxic. NAD. no systemic sx. found to have poss L elbow fx on xray. s/p arm sling. also found to have 3 minimally displaced rib fx on cxr w/o assoc ptx vs hemothorax. s/p spirometer. L finger xray w/o acute fx/dislocation. offered but declined splint for comfort. all xrays reviewed w/ radiology. ct pelvis w/o acute fx/dislocation however pt still unable to ambulate w/o assistance. cannot exclude underlying occult fx. will require mri if persistent pain or unable to ambulate. pt w/ full capacity. offered but declined admission for poss mri and/or pt evaluation. pt/wife state they already have pt and can get additional help at home. understands risk of worsening disease. requesting to be discharged. will dc w/ outpatient ortho fu. strict return precautions.

## 2022-11-14 NOTE — ED PROVIDER NOTE - PATIENT PORTAL LINK FT
You can access the FollowMyHealth Patient Portal offered by [facility] by registering at the following website: [email or web address]. By joining [employer]’s FollowMyHealth portal, you will also be able to view your health information using other applications (apps) compatible with our system.

## 2022-11-15 VITALS
DIASTOLIC BLOOD PRESSURE: 69 MMHG | HEART RATE: 79 BPM | SYSTOLIC BLOOD PRESSURE: 159 MMHG | RESPIRATION RATE: 19 BRPM | TEMPERATURE: 98 F | OXYGEN SATURATION: 99 %

## 2022-11-15 NOTE — ED POST DISCHARGE NOTE - RESULT SUMMARY
Overread by dr. cunningham at 8:12pm  - addn finding distal hydroureter.  attempted to call patient, no answer - left message.

## 2022-11-15 NOTE — ED POST DISCHARGE NOTE - DETAILS
[ronan] also attempted call. no answer. voicemail message left. [ronan, 11/16/22 @ 12:16pm] also attempted call. no answer. voicemail message left. [ronan, 11/16/22 @ 12:16pm] also attempted call. no answer. voicemail message left. [ferraro, 11/16/22, 1:53pm] delay in note entry due to limited home access. pt/wife contacted at 12:26pm. aware of findings. reports persistent L hip pain. denies cp/sob. home pulse ox >96%. reccs to go to [facility] for admission given underlying rib fx. wife insistent on returning to [facility]. fully understands that coming to [facility] may delay care. understands risks. questions answered. [ferraro, 11/16/22 @ 12:16pm] also attempted call. no answer. voicemail message left. [ronan, 11/16/22, 1:53pm] delay in note entry due to limited home access. pt/wife successfully contacted at 12:26pm. aware of findings. reports persistent L hip pain. denies cp/sob. home pulse ox >96%. reccs to go to [facility] for admission given underlying rib fx. wife insistent on returning to [facility]. ETA 1hr. fully understands that coming to [facility] may delay care. understands risks. questions answered. 11/17/22 pt and wife informed of 5th left finger fracture, they are at [place], subsuquent ct chest showed no pnuemo, wife is aggressive with staff that pt was sent to [place], they verb understanding of finger fx

## 2022-11-16 ENCOUNTER — EMERGENCY (EMERGENCY)
Facility: HOSPITAL | Age: 87
LOS: 1 days | Discharge: ANOTHER HOSPITAL | End: 2022-11-16
Attending: EMERGENCY MEDICINE | Admitting: STUDENT IN AN ORGANIZED HEALTH CARE EDUCATION/TRAINING PROGRAM
Payer: MEDICARE

## 2022-11-16 VITALS
HEART RATE: 67 BPM | OXYGEN SATURATION: 98 % | RESPIRATION RATE: 18 BRPM | HEIGHT: 71 IN | SYSTOLIC BLOOD PRESSURE: 126 MMHG | TEMPERATURE: 98 F | DIASTOLIC BLOOD PRESSURE: 79 MMHG

## 2022-11-16 VITALS
DIASTOLIC BLOOD PRESSURE: 64 MMHG | TEMPERATURE: 98 F | HEART RATE: 70 BPM | SYSTOLIC BLOOD PRESSURE: 111 MMHG | OXYGEN SATURATION: 99 % | RESPIRATION RATE: 17 BRPM

## 2022-11-16 DIAGNOSIS — S22.079A UNSPECIFIED FRACTURE OF T9-T10 VERTEBRA, INITIAL ENCOUNTER FOR CLOSED FRACTURE: ICD-10-CM
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
## 2022-11-16 DIAGNOSIS — S22.42XA MULTIPLE FRACTURES OF RIBS, LEFT SIDE, INITIAL ENCOUNTER FOR CLOSED FRACTURE: ICD-10-CM

## 2022-11-16 LAB
ALBUMIN SERPL ELPH-MCNC: 3.5 G/DL — SIGNIFICANT CHANGE UP (ref 3.3–5)
ALP SERPL-CCNC: 80 U/L — SIGNIFICANT CHANGE UP (ref 40–120)
ALT FLD-CCNC: 19 U/L — SIGNIFICANT CHANGE UP (ref 10–45)
ANION GAP SERPL CALC-SCNC: 9 MMOL/L — SIGNIFICANT CHANGE UP (ref 5–17)
APTT BLD: 31.7 SEC — SIGNIFICANT CHANGE UP (ref 27.5–35.5)
AST SERPL-CCNC: 30 U/L — SIGNIFICANT CHANGE UP (ref 10–40)
BASOPHILS # BLD AUTO: 0.05 K/UL — SIGNIFICANT CHANGE UP (ref 0–0.2)
BASOPHILS NFR BLD AUTO: 0.5 % — SIGNIFICANT CHANGE UP (ref 0–2)
BILIRUB SERPL-MCNC: 0.5 MG/DL — SIGNIFICANT CHANGE UP (ref 0.2–1.2)
BLD GP AB SCN SERPL QL: NEGATIVE — SIGNIFICANT CHANGE UP
BUN SERPL-MCNC: 26 MG/DL — HIGH (ref 7–23)
CALCIUM SERPL-MCNC: 9.1 MG/DL — SIGNIFICANT CHANGE UP (ref 8.4–10.5)
CHLORIDE SERPL-SCNC: 100 MMOL/L — SIGNIFICANT CHANGE UP (ref 96–108)
CO2 SERPL-SCNC: 26 MMOL/L — SIGNIFICANT CHANGE UP (ref 22–31)
CREAT SERPL-MCNC: 1.11 MG/DL — SIGNIFICANT CHANGE UP (ref 0.5–1.3)
EGFR: 63 ML/MIN/1.73M2 — SIGNIFICANT CHANGE UP
EOSINOPHIL # BLD AUTO: 0.17 K/UL — SIGNIFICANT CHANGE UP (ref 0–0.5)
EOSINOPHIL NFR BLD AUTO: 1.8 % — SIGNIFICANT CHANGE UP (ref 0–6)
GLUCOSE SERPL-MCNC: 172 MG/DL — HIGH (ref 70–99)
HCT VFR BLD CALC: 38.2 % — LOW (ref 39–50)
HGB BLD-MCNC: 11.8 G/DL — LOW (ref 13–17)
IMM GRANULOCYTES NFR BLD AUTO: 0.4 % — SIGNIFICANT CHANGE UP (ref 0–0.9)
INR BLD: 1.15 — SIGNIFICANT CHANGE UP (ref 0.88–1.16)
LIDOCAIN IGE QN: 28 U/L — SIGNIFICANT CHANGE UP (ref 7–60)
LYMPHOCYTES # BLD AUTO: 1.76 K/UL — SIGNIFICANT CHANGE UP (ref 1–3.3)
LYMPHOCYTES # BLD AUTO: 18.2 % — SIGNIFICANT CHANGE UP (ref 13–44)
MCHC RBC-ENTMCNC: 25.5 PG — LOW (ref 27–34)
MCHC RBC-ENTMCNC: 30.9 GM/DL — LOW (ref 32–36)
MCV RBC AUTO: 82.5 FL — SIGNIFICANT CHANGE UP (ref 80–100)
MONOCYTES # BLD AUTO: 0.93 K/UL — HIGH (ref 0–0.9)
MONOCYTES NFR BLD AUTO: 9.6 % — SIGNIFICANT CHANGE UP (ref 2–14)
NEUTROPHILS # BLD AUTO: 6.73 K/UL — SIGNIFICANT CHANGE UP (ref 1.8–7.4)
NEUTROPHILS NFR BLD AUTO: 69.5 % — SIGNIFICANT CHANGE UP (ref 43–77)
NRBC # BLD: 0 /100 WBCS — SIGNIFICANT CHANGE UP (ref 0–0)
PLATELET # BLD AUTO: 230 K/UL — SIGNIFICANT CHANGE UP (ref 150–400)
POTASSIUM SERPL-MCNC: 4.8 MMOL/L — SIGNIFICANT CHANGE UP (ref 3.5–5.3)
POTASSIUM SERPL-SCNC: 4.8 MMOL/L — SIGNIFICANT CHANGE UP (ref 3.5–5.3)
PROT SERPL-MCNC: 7.5 G/DL — SIGNIFICANT CHANGE UP (ref 6–8.3)
PROTHROM AB SERPL-ACNC: 13.7 SEC — HIGH (ref 10.5–13.4)
RBC # BLD: 4.63 M/UL — SIGNIFICANT CHANGE UP (ref 4.2–5.8)
RBC # FLD: 16.7 % — HIGH (ref 10.3–14.5)
RH IG SCN BLD-IMP: POSITIVE — SIGNIFICANT CHANGE UP
SARS-COV-2 RNA SPEC QL NAA+PROBE: NEGATIVE — SIGNIFICANT CHANGE UP
SODIUM SERPL-SCNC: 135 MMOL/L — SIGNIFICANT CHANGE UP (ref 135–145)
WBC # BLD: 9.68 K/UL — SIGNIFICANT CHANGE UP (ref 3.8–10.5)
WBC # FLD AUTO: 9.68 K/UL — SIGNIFICANT CHANGE UP (ref 3.8–10.5)

## 2022-11-16 PROCEDURE — 99285 EMERGENCY DEPT VISIT HI MDM: CPT

## 2022-11-16 PROCEDURE — 71260 CT THORAX DX C+: CPT | Mod: 26,MA

## 2022-11-16 PROCEDURE — 72125 CT NECK SPINE W/O DYE: CPT | Mod: 26,MA

## 2022-11-16 PROCEDURE — 70450 CT HEAD/BRAIN W/O DYE: CPT | Mod: 26,MA

## 2022-11-16 PROCEDURE — 73200 CT UPPER EXTREMITY W/O DYE: CPT | Mod: 26,LT,MA

## 2022-11-16 PROCEDURE — 74177 CT ABD & PELVIS W/CONTRAST: CPT | Mod: 26,MA

## 2022-11-16 RX ORDER — ACETAMINOPHEN 500 MG
1000 TABLET ORAL ONCE
Refills: 0 | Status: COMPLETED | OUTPATIENT
Start: 2022-11-16 | End: 2022-11-16

## 2022-11-16 RX ORDER — ACETAMINOPHEN 500 MG
1000 TABLET ORAL ONCE
Refills: 0 | Status: DISCONTINUED | OUTPATIENT
Start: 2022-11-16 | End: 2022-11-16

## 2022-11-16 RX ADMIN — Medication 400 MILLIGRAM(S): at 17:30

## 2022-11-16 NOTE — ED PROVIDER NOTE - CLINICAL SUMMARY MEDICAL DECISION MAKING FREE TEXT BOX
89 y/o M pt presents to ED s/p being seen for mechanical fall yesterday, called back in due to radiology's concern for L apical pneumothorax. I am unable to pick this out on XR in ED, will CT to evaluate for this. In addition, will trauma scan head/neck/elbow as pt reported episode of dizziness yesterday and has L shoulder achiness with maybe deferred pain. Advised pt transfer to [place] due to rib fracture, but currently pt's wife is refusing transfer.

## 2022-11-16 NOTE — ED PROVIDER NOTE - PROGRESS NOTE DETAILS
Jeff: Pt and wife advised of multiple rib fxs and T10 fx.  Recommended transfer to trauma center, ie [facility], to which the wife adamantly refused. Dr Melgar and I tried to explain the risk for complications, especially in a patient of this age.  She repeatedly interrupts and does not want to hear that we have a policy that prevents us from admitting patients with multiple rib fractures. She insists that we break policy due to the fact that her  is a retired surgeon in the [employer] system. We spoke with hospitalist, who agreed that the patient should be transferred to trauma center.  We offered to try to see if we could arrange a transfer to a trauma center within the [employer] [facility] system.  The wife does not want this, but the patient wishes to try this route. HUGH: After continued counseling, pt and wife accepting transfer to [place]. Counseled on risks vs benefits and express understanding.  Pt cont to have stable vital signs, no increased WOB or chest pain

## 2022-11-16 NOTE — ED ADULT TRIAGE NOTE - CHIEF COMPLAINT QUOTE
Pt sent in for admission d/t 3 broken ribs to the L side s/p fall. Denies chest pain, SOB. Pt sent in for admission d/t L sided pneumothorax s/p fall. Denies chest pain, SOB.
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
## 2022-11-16 NOTE — ED PROVIDER NOTE - MUSCULOSKELETAL, MLM
Spine appears normal, contusion to L elbow, L hip tender but full range of motion, no edema or leg tenderness.
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
## 2022-11-16 NOTE — ED ADULT NURSE REASSESSMENT NOTE - NS ED NURSE REASSESS COMMENT FT1
Pt. wife @ bedside verbally abusive and dismissive to staff when trying to care for pt. ASHLEY Pearson and charge RN made aware.

## 2022-11-16 NOTE — ED PROVIDER NOTE - NSRISKOFTRANSFER_ED_A_ED
Note faxed.    Deterioration of Condition En Route/Death or Disability/Increased Pain/Transportation Risk (There is always a risk of traffic delays resulting in deterioration of condition.)
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
## 2022-11-17 PROCEDURE — 36415 COLL VENOUS BLD VENIPUNCTURE: CPT

## 2022-11-17 PROCEDURE — 93005 ELECTROCARDIOGRAM TRACING: CPT

## 2022-11-17 PROCEDURE — 71260 CT THORAX DX C+: CPT | Mod: MA

## 2022-11-17 PROCEDURE — 85025 COMPLETE CBC W/AUTO DIFF WBC: CPT

## 2022-11-17 PROCEDURE — 86850 RBC ANTIBODY SCREEN: CPT

## 2022-11-17 PROCEDURE — 83690 ASSAY OF LIPASE: CPT

## 2022-11-17 PROCEDURE — 96376 TX/PRO/DX INJ SAME DRUG ADON: CPT

## 2022-11-17 PROCEDURE — 96374 THER/PROPH/DIAG INJ IV PUSH: CPT

## 2022-11-17 PROCEDURE — 86901 BLOOD TYPING SEROLOGIC RH(D): CPT

## 2022-11-17 PROCEDURE — 73200 CT UPPER EXTREMITY W/O DYE: CPT | Mod: MA

## 2022-11-17 PROCEDURE — 72125 CT NECK SPINE W/O DYE: CPT | Mod: MA

## 2022-11-17 PROCEDURE — 87635 SARS-COV-2 COVID-19 AMP PRB: CPT

## 2022-11-17 PROCEDURE — 80053 COMPREHEN METABOLIC PANEL: CPT

## 2022-11-17 PROCEDURE — 70450 CT HEAD/BRAIN W/O DYE: CPT | Mod: MA

## 2022-11-17 PROCEDURE — 85730 THROMBOPLASTIN TIME PARTIAL: CPT

## 2022-11-17 PROCEDURE — 74177 CT ABD & PELVIS W/CONTRAST: CPT | Mod: MA

## 2022-11-17 PROCEDURE — 86900 BLOOD TYPING SEROLOGIC ABO: CPT

## 2022-11-17 PROCEDURE — 85610 PROTHROMBIN TIME: CPT

## 2022-11-17 PROCEDURE — 99212 OFFICE O/P EST SF 10 MIN: CPT | Mod: 25

## 2022-11-17 RX ORDER — ACETAMINOPHEN 500 MG
650 TABLET ORAL ONCE
Refills: 0 | Status: DISCONTINUED | OUTPATIENT
Start: 2022-11-17 | End: 2022-11-17

## 2022-11-17 RX ORDER — ACETAMINOPHEN 500 MG
1000 TABLET ORAL ONCE
Refills: 0 | Status: COMPLETED | OUTPATIENT
Start: 2022-11-17 | End: 2022-11-17

## 2022-11-17 RX ADMIN — Medication 400 MILLIGRAM(S): at 00:58

## 2022-11-17 NOTE — ED ADULT NURSE REASSESSMENT NOTE - NS ED NURSE REASSESS COMMENT FT1
Report received from Paige HERNANDEZ, will assume care of pt at this time. Pt in ER room 20, NAD noted, pt waiting transfer to [facility]

## 2022-11-18 DIAGNOSIS — S22.079D UNSPECIFIED FRACTURE OF T9-T10 VERTEBRA, SUBSEQUENT ENCOUNTER FOR FRACTURE WITH ROUTINE HEALING: ICD-10-CM

## 2022-11-18 DIAGNOSIS — N40.1 BENIGN PROSTATIC HYPERPLASIA WITH LOWER URINARY TRACT SYMPTOMS: ICD-10-CM

## 2022-11-18 DIAGNOSIS — Z87.19 PERSONAL HISTORY OF OTHER DISEASES OF THE DIGESTIVE SYSTEM: ICD-10-CM

## 2022-11-18 DIAGNOSIS — M25.552 PAIN IN LEFT HIP: ICD-10-CM

## 2022-11-18 DIAGNOSIS — S22.42XD MULTIPLE FRACTURES OF RIBS, LEFT SIDE, SUBSEQUENT ENCOUNTER FOR FRACTURE WITH ROUTINE HEALING: ICD-10-CM

## 2022-11-18 DIAGNOSIS — R33.9 RETENTION OF URINE, UNSPECIFIED: ICD-10-CM

## 2022-11-18 DIAGNOSIS — I10 ESSENTIAL (PRIMARY) HYPERTENSION: ICD-10-CM
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
## 2022-11-18 DIAGNOSIS — E03.9 HYPOTHYROIDISM, UNSPECIFIED: ICD-10-CM

## 2022-11-18 DIAGNOSIS — Z79.01 LONG TERM (CURRENT) USE OF ANTICOAGULANTS: ICD-10-CM

## 2022-11-18 DIAGNOSIS — Z20.822 CONTACT WITH AND (SUSPECTED) EXPOSURE TO COVID-19: ICD-10-CM

## 2022-11-18 DIAGNOSIS — Z90.49 ACQUIRED ABSENCE OF OTHER SPECIFIED PARTS OF DIGESTIVE TRACT: ICD-10-CM

## 2022-11-18 DIAGNOSIS — Z88.0 ALLERGY STATUS TO PENICILLIN: ICD-10-CM

## 2023-03-27 VITALS
TEMPERATURE: 97 F | WEIGHT: 139.99 LBS | SYSTOLIC BLOOD PRESSURE: 137 MMHG | OXYGEN SATURATION: 95 % | RESPIRATION RATE: 16 BRPM | DIASTOLIC BLOOD PRESSURE: 67 MMHG | HEART RATE: 56 BPM

## 2023-03-27 LAB
ANION GAP SERPL CALC-SCNC: 9 MMOL/L — SIGNIFICANT CHANGE UP (ref 5–17)
APTT BLD: 33.2 SEC — SIGNIFICANT CHANGE UP (ref 27.5–35.5)
BUN SERPL-MCNC: 31 MG/DL — HIGH (ref 7–23)
CALCIUM SERPL-MCNC: 9.1 MG/DL — SIGNIFICANT CHANGE UP (ref 8.4–10.5)
CHLORIDE SERPL-SCNC: 102 MMOL/L — SIGNIFICANT CHANGE UP (ref 96–108)
CO2 SERPL-SCNC: 27 MMOL/L — SIGNIFICANT CHANGE UP (ref 22–31)
CREAT SERPL-MCNC: 0.82 MG/DL — SIGNIFICANT CHANGE UP (ref 0.5–1.3)
EGFR: 83 ML/MIN/1.73M2 — SIGNIFICANT CHANGE UP
GLUCOSE SERPL-MCNC: 107 MG/DL — HIGH (ref 70–99)
HCT VFR BLD CALC: 41.9 % — SIGNIFICANT CHANGE UP (ref 39–50)
HGB BLD-MCNC: 13.3 G/DL — SIGNIFICANT CHANGE UP (ref 13–17)
INR BLD: 1.06 — SIGNIFICANT CHANGE UP (ref 0.88–1.16)
MCHC RBC-ENTMCNC: 27.4 PG — SIGNIFICANT CHANGE UP (ref 27–34)
MCHC RBC-ENTMCNC: 31.7 GM/DL — LOW (ref 32–36)
MCV RBC AUTO: 86.4 FL — SIGNIFICANT CHANGE UP (ref 80–100)
NRBC # BLD: 0 /100 WBCS — SIGNIFICANT CHANGE UP (ref 0–0)
PLATELET # BLD AUTO: 280 K/UL — SIGNIFICANT CHANGE UP (ref 150–400)
POTASSIUM SERPL-MCNC: 4.6 MMOL/L — SIGNIFICANT CHANGE UP (ref 3.5–5.3)
POTASSIUM SERPL-SCNC: 4.6 MMOL/L — SIGNIFICANT CHANGE UP (ref 3.5–5.3)
PROTHROM AB SERPL-ACNC: 12.6 SEC — SIGNIFICANT CHANGE UP (ref 10.5–13.4)
RBC # BLD: 4.85 M/UL — SIGNIFICANT CHANGE UP (ref 4.2–5.8)
RBC # FLD: 14.4 % — SIGNIFICANT CHANGE UP (ref 10.3–14.5)
SARS-COV-2 RNA SPEC QL NAA+PROBE: SIGNIFICANT CHANGE UP
SODIUM SERPL-SCNC: 138 MMOL/L — SIGNIFICANT CHANGE UP (ref 135–145)
WBC # BLD: 18.02 K/UL — HIGH (ref 3.8–10.5)
WBC # FLD AUTO: 18.02 K/UL — HIGH (ref 3.8–10.5)

## 2023-03-27 PROCEDURE — 72125 CT NECK SPINE W/O DYE: CPT | Mod: 26,MH

## 2023-03-27 PROCEDURE — 72192 CT PELVIS W/O DYE: CPT | Mod: 26,MB

## 2023-03-27 PROCEDURE — 70450 CT HEAD/BRAIN W/O DYE: CPT | Mod: 26,MH

## 2023-03-27 PROCEDURE — 99285 EMERGENCY DEPT VISIT HI MDM: CPT

## 2023-03-27 PROCEDURE — 73552 X-RAY EXAM OF FEMUR 2/>: CPT | Mod: 26,RT

## 2023-03-27 PROCEDURE — 73560 X-RAY EXAM OF KNEE 1 OR 2: CPT | Mod: 26,RT

## 2023-03-27 PROCEDURE — 73502 X-RAY EXAM HIP UNI 2-3 VIEWS: CPT | Mod: 26,RT

## 2023-03-27 PROCEDURE — 71045 X-RAY EXAM CHEST 1 VIEW: CPT | Mod: 26

## 2023-03-27 NOTE — ED ADULT NURSE NOTE - SUICIDE SCREENING DEPRESSION
Cholesterol levels just slightly elevated, otherwise labs look good. Stay active, stay well. Keep weight in check. Negative

## 2023-03-27 NOTE — CONSULT NOTE ADULT - SUBJECTIVE AND OBJECTIVE BOX
Ortho Note    Pt comfortable without complaints, pain controlled  Denies CP, SOB, N/V, new numbness/tingling     Vital Signs Last 24 Hrs  T(C): 36.3 (03-27-23 @ 18:56), Max: 36.3 (03-27-23 @ 18:56)  T(F): 97.4 (03-27-23 @ 18:56), Max: 97.4 (03-27-23 @ 18:56)  HR: 56 (03-27-23 @ 18:56) (56 - 56)  BP: 137/67 (03-27-23 @ 18:56) (137/67 - 137/67)  BP(mean): --  RR: 16 (03-27-23 @ 18:56) (16 - 16)  SpO2: 95% (03-27-23 @ 18:56) (95% - 95%)  I&O's Summary      General: Pt Alert and oriented, NAD  DSG C/D/I-   Pulses:  Sensation:  Motor:   EHL/FHL/TA/GS                          13.3   18.02 )-----------( 280      ( 27 Mar 2023 19:20 )             41.9     03-27    138  |  102  |  31<H>  ----------------------------<  107<H>  4.6   |  27  |  0.82    Ca    9.1      27 Mar 2023 19:20        A/P: 89yMale s/p   - Stable  - Pain Control  - DVT ppx:  - PT, WBS:     Ortho Pager [phone] Orthopaedic Surgery Consult Note    For Surgeon: Vane      Patient is a 89y old  Male who presents with a chief complaint of fall on r hip and head off of Dr. office table. States no LOC. pain in distribution of R hip without radiation, tingling.       Allergies    penicillin (Rash)    Intolerances        MEDICATIONS  (STANDING):    MEDICATIONS  (PRN):      PAST MEDICAL & SURGICAL HISTORY:  Hypothyroidism      HTN (hypertension)      Esophageal spasm      BPH (benign prostatic hyperplasia)      BPH (benign prostatic hyperplasia)      No significant past surgical history          Social History:      FAMILY HISTORY:        Vital Signs Last 24 Hrs  T(C): 36.3 (27 Mar 2023 18:56), Max: 36.3 (27 Mar 2023 18:56)  T(F): 97.4 (27 Mar 2023 18:56), Max: 97.4 (27 Mar 2023 18:56)  HR: 56 (27 Mar 2023 18:56) (56 - 56)  BP: 137/67 (27 Mar 2023 18:56) (137/67 - 137/67)  BP(mean): --  RR: 16 (27 Mar 2023 18:56) (16 - 16)  SpO2: 95% (27 Mar 2023 18:56) (95% - 95%)    Parameters below as of 27 Mar 2023 18:56  Patient On (Oxygen Delivery Method): room air        PE:  B/L LE: sensation intact  DP 2+  brisk cap refill  EHL/FHL/TA/GS firing bilaterally R < L R 2/2 pain  point tenderness in ASIS R side  skin intact                          13.3   18.02 )-----------( 280      ( 27 Mar 2023 19:20 )             41.9     03-27    138  |  102  |  31<H>  ----------------------------<  107<H>  4.6   |  27  |  0.82    Ca    9.1      27 Mar 2023 19:20        Imaging:   < from: CT Cervical Spine No Cont (03.27.23 @ 20:57) >    ******PRELIMINARY REPORT******      ******PRELIMINARY REPORT******         ACC: [id number] EXAM:  CT CERVICAL SPINE   ORDERED BY: ALEX OLIVARES     PROCEDURE DATE:  03/27/2023    ******PRELIMINARY REPORT******      ******PRELIMINARY REPORT******           INTERPRETATION:  PROCEDURE: CT Cervical spine without contrast    INDICATION: Trauma; status post fall.    TECHNIQUE: Multiple axial sections were obtained from the mid orbits to   the sternoclavicular joint. Sagittal and coronal reformats wereobtained   from the axial data set. The images were reviewed in soft tissue and bone   windows.    COMPARISON: CT cervical spine 11/16/2022.    FINDINGS:    Status post fusion spanning C3-T1. There is posterior fusion C3-T1 with   bilateral mass screws at C3-C5 and bilateral transpedicular screws at   C7-T1 with vertical supporting rods. Status post anterior cervical   discectomy fusion spanning C4-C7 with ventral plate and screw fixation   and interbody cage devices at C4-5 through C6-7. There is no evidence of   hardware fracture. Status post decompressive laminectomy at C5-C6. The   hardware is in anatomic alignment.    There is a chronic T1 compression fracture which is stable from   11/16/2022. The remainder the vertebral body heights are maintained.   There is multilevel disc space narrowing of the nonsurgical levels. The   prevertebral soft tissues are unremarkable.    Evaluation the spinal canal is limited within the cervical spine due to   artifact from surgical hardware.      IMPRESSION:    1. Since 11/16/2022, no acute fracture or traumatic malalignment of the   cervical spine.    2. Again noted to be status post fusion C3-T1 without evidence of   hardware complication.        ******PRELIMINARY REPORT******      ******PRELIMINARY REPORT******       YARY VENTURA MD; Resident Radiologist  This document is a PRELIMINARY interpretation and is pending final   attending approval. Mar 27 2023  9:56PM    < end of copied text >      A/P: 89yMale s/p fall off physician exam table presenting with R side pelvic fx    - Patient history, exam, imaging and plan discussed with Dr. Cifuentes    Ortho Pager [phone]   Orthopaedic Surgery Consult Note    For Surgeon: Vane      Patient is a 89y old  Male who presents with a chief complaint of fall on r hip and head off of Dr. office table. States no LOC. pain in distribution of R hip without radiation, tingling.       Allergies    penicillin (Rash)    Intolerances        MEDICATIONS  (STANDING):    MEDICATIONS  (PRN):      PAST MEDICAL & SURGICAL HISTORY:  Hypothyroidism      HTN (hypertension)      Esophageal spasm      BPH (benign prostatic hyperplasia)      BPH (benign prostatic hyperplasia)      No significant past surgical history          Social History:      FAMILY HISTORY:        Vital Signs Last 24 Hrs  T(C): 36.3 (27 Mar 2023 18:56), Max: 36.3 (27 Mar 2023 18:56)  T(F): 97.4 (27 Mar 2023 18:56), Max: 97.4 (27 Mar 2023 18:56)  HR: 56 (27 Mar 2023 18:56) (56 - 56)  BP: 137/67 (27 Mar 2023 18:56) (137/67 - 137/67)  BP(mean): --  RR: 16 (27 Mar 2023 18:56) (16 - 16)  SpO2: 95% (27 Mar 2023 18:56) (95% - 95%)    Parameters below as of 27 Mar 2023 18:56  Patient On (Oxygen Delivery Method): room air        PE:  B/L LE: sensation intact  DP 2+  brisk cap refill  EHL/FHL/TA/GS firing bilaterally R < L R 2/2 pain  point tenderness in ASIS R side  skin intact                          13.3   18.02 )-----------( 280      ( 27 Mar 2023 19:20 )             41.9     03-27    138  |  102  |  31<H>  ----------------------------<  107<H>  4.6   |  27  |  0.82    Ca    9.1      27 Mar 2023 19:20        Imaging:   < from: CT Cervical Spine No Cont (03.27.23 @ 20:57) >    ******PRELIMINARY REPORT******      ******PRELIMINARY REPORT******         ACC: [id number] EXAM:  CT CERVICAL SPINE   ORDERED BY: ALEX OLIVARES     PROCEDURE DATE:  03/27/2023    ******PRELIMINARY REPORT******      ******PRELIMINARY REPORT******           INTERPRETATION:  PROCEDURE: CT Cervical spine without contrast    INDICATION: Trauma; status post fall.    TECHNIQUE: Multiple axial sections were obtained from the mid orbits to   the sternoclavicular joint. Sagittal and coronal reformats wereobtained   from the axial data set. The images were reviewed in soft tissue and bone   windows.    COMPARISON: CT cervical spine 11/16/2022.    FINDINGS:    Status post fusion spanning C3-T1. There is posterior fusion C3-T1 with   bilateral mass screws at C3-C5 and bilateral transpedicular screws at   C7-T1 with vertical supporting rods. Status post anterior cervical   discectomy fusion spanning C4-C7 with ventral plate and screw fixation   and interbody cage devices at C4-5 through C6-7. There is no evidence of   hardware fracture. Status post decompressive laminectomy at C5-C6. The   hardware is in anatomic alignment.    There is a chronic T1 compression fracture which is stable from   11/16/2022. The remainder the vertebral body heights are maintained.   There is multilevel disc space narrowing of the nonsurgical levels. The   prevertebral soft tissues are unremarkable.    Evaluation the spinal canal is limited within the cervical spine due to   artifact from surgical hardware.      IMPRESSION:    1. Since 11/16/2022, no acute fracture or traumatic malalignment of the   cervical spine.    2. Again noted to be status post fusion C3-T1 without evidence of   hardware complication.        ******PRELIMINARY REPORT******      ******PRELIMINARY REPORT******       YARY VENTURA MD; Resident Radiologist  This document is a PRELIMINARY interpretation and is pending final   attending approval. Mar 27 2023  9:56PM    < end of copied text >      A/P: 89yMale s/p fall off physician exam table presenting with R side pelvic fx  -frailty: Toe touch weight bearing  -non-operative management  -PT eval  - Patient history, exam, imaging and plan pending discussion with Dr. Cifuentes      Ortho Pager [phone]   Orthopaedic Surgery Consult Note    For Surgeon: Vane      Patient is a 89y old  Male who presents with a chief complaint of fall on r hip and head off of Dr. office table. States no LOC. pain in distribution of R hip without radiation, tingling.       Allergies    penicillin (Rash)    Intolerances        MEDICATIONS  (STANDING):    MEDICATIONS  (PRN):      PAST MEDICAL & SURGICAL HISTORY:  Hypothyroidism      HTN (hypertension)      Esophageal spasm      BPH (benign prostatic hyperplasia)      BPH (benign prostatic hyperplasia)      No significant past surgical history          Social History:      FAMILY HISTORY:        Vital Signs Last 24 Hrs  T(C): 36.3 (27 Mar 2023 18:56), Max: 36.3 (27 Mar 2023 18:56)  T(F): 97.4 (27 Mar 2023 18:56), Max: 97.4 (27 Mar 2023 18:56)  HR: 56 (27 Mar 2023 18:56) (56 - 56)  BP: 137/67 (27 Mar 2023 18:56) (137/67 - 137/67)  BP(mean): --  RR: 16 (27 Mar 2023 18:56) (16 - 16)  SpO2: 95% (27 Mar 2023 18:56) (95% - 95%)    Parameters below as of 27 Mar 2023 18:56  Patient On (Oxygen Delivery Method): room air        PE:  B/L LE: sensation intact  DP 2+  brisk cap refill  EHL/FHL/TA/GS firing bilaterally R < L R 2/2 pain  point tenderness in ASIS R side  skin intact                          13.3   18.02 )-----------( 280      ( 27 Mar 2023 19:20 )             41.9     03-27    138  |  102  |  31<H>  ----------------------------<  107<H>  4.6   |  27  |  0.82    Ca    9.1      27 Mar 2023 19:20        Imaging:   < from: CT Cervical Spine No Cont (03.27.23 @ 20:57) >    ******PRELIMINARY REPORT******      ******PRELIMINARY REPORT******         ACC: [id number] EXAM:  CT CERVICAL SPINE   ORDERED BY: ALEX OLIVARES     PROCEDURE DATE:  03/27/2023    ******PRELIMINARY REPORT******      ******PRELIMINARY REPORT******           INTERPRETATION:  PROCEDURE: CT Cervical spine without contrast    INDICATION: Trauma; status post fall.    TECHNIQUE: Multiple axial sections were obtained from the mid orbits to   the sternoclavicular joint. Sagittal and coronal reformats wereobtained   from the axial data set. The images were reviewed in soft tissue and bone   windows.    COMPARISON: CT cervical spine 11/16/2022.    FINDINGS:    Status post fusion spanning C3-T1. There is posterior fusion C3-T1 with   bilateral mass screws at C3-C5 and bilateral transpedicular screws at   C7-T1 with vertical supporting rods. Status post anterior cervical   discectomy fusion spanning C4-C7 with ventral plate and screw fixation   and interbody cage devices at C4-5 through C6-7. There is no evidence of   hardware fracture. Status post decompressive laminectomy at C5-C6. The   hardware is in anatomic alignment.    There is a chronic T1 compression fracture which is stable from   11/16/2022. The remainder the vertebral body heights are maintained.   There is multilevel disc space narrowing of the nonsurgical levels. The   prevertebral soft tissues are unremarkable.    Evaluation the spinal canal is limited within the cervical spine due to   artifact from surgical hardware.      IMPRESSION:    1. Since 11/16/2022, no acute fracture or traumatic malalignment of the   cervical spine.    2. Again noted to be status post fusion C3-T1 without evidence of   hardware complication.        ******PRELIMINARY REPORT******      ******PRELIMINARY REPORT******       YARY VENTURA MD; Resident Radiologist  This document is a PRELIMINARY interpretation and is pending final   attending approval. Mar 27 2023  9:56PM    < end of copied text >      A/P: 89yMale s/p fall off physician exam table presenting with R side pelvic fx  -frailty, med admit  -Toe touch weight bearing  -non-operative management  -PT eval  - Patient history, exam, imaging and plan as per discussion with Dr. Cifuentes      Ortho Pager [phone]   Orthopaedic Surgery Consult Note    For Surgeon: Vane      Patient is a 89y old  Male who presents with a chief complaint of fall on r hip and head off of Dr. office table. States no LOC. pain in distribution of R hip without radiation, tingling.       Allergies    penicillin (Rash)    Intolerances        MEDICATIONS  (STANDING):    MEDICATIONS  (PRN):      PAST MEDICAL & SURGICAL HISTORY:  Hypothyroidism      HTN (hypertension)      Esophageal spasm      BPH (benign prostatic hyperplasia)      BPH (benign prostatic hyperplasia)      No significant past surgical history          Social History:      FAMILY HISTORY:        Vital Signs Last 24 Hrs  T(C): 36.3 (27 Mar 2023 18:56), Max: 36.3 (27 Mar 2023 18:56)  T(F): 97.4 (27 Mar 2023 18:56), Max: 97.4 (27 Mar 2023 18:56)  HR: 56 (27 Mar 2023 18:56) (56 - 56)  BP: 137/67 (27 Mar 2023 18:56) (137/67 - 137/67)  BP(mean): --  RR: 16 (27 Mar 2023 18:56) (16 - 16)  SpO2: 95% (27 Mar 2023 18:56) (95% - 95%)    Parameters below as of 27 Mar 2023 18:56  Patient On (Oxygen Delivery Method): room air        PE:  B/L LE: sensation intact  DP 2+  brisk cap refill  EHL/FHL/TA/GS firing bilaterally R < L R 2/2 pain  point tenderness in ASIS R side  skin intact                          13.3   18.02 )-----------( 280      ( 27 Mar 2023 19:20 )             41.9     03-27    138  |  102  |  31<H>  ----------------------------<  107<H>  4.6   |  27  |  0.82    Ca    9.1      27 Mar 2023 19:20        Imaging:   < from: CT Cervical Spine No Cont (03.27.23 @ 20:57) >    ******PRELIMINARY REPORT******      ******PRELIMINARY REPORT******         ACC: [id number] EXAM:  CT CERVICAL SPINE   ORDERED BY: ALEX OLIVARES     PROCEDURE DATE:  03/27/2023    ******PRELIMINARY REPORT******      ******PRELIMINARY REPORT******           INTERPRETATION:  PROCEDURE: CT Cervical spine without contrast    INDICATION: Trauma; status post fall.    TECHNIQUE: Multiple axial sections were obtained from the mid orbits to   the sternoclavicular joint. Sagittal and coronal reformats wereobtained   from the axial data set. The images were reviewed in soft tissue and bone   windows.    COMPARISON: CT cervical spine 11/16/2022.    FINDINGS:    Status post fusion spanning C3-T1. There is posterior fusion C3-T1 with   bilateral mass screws at C3-C5 and bilateral transpedicular screws at   C7-T1 with vertical supporting rods. Status post anterior cervical   discectomy fusion spanning C4-C7 with ventral plate and screw fixation   and interbody cage devices at C4-5 through C6-7. There is no evidence of   hardware fracture. Status post decompressive laminectomy at C5-C6. The   hardware is in anatomic alignment.    There is a chronic T1 compression fracture which is stable from   11/16/2022. The remainder the vertebral body heights are maintained.   There is multilevel disc space narrowing of the nonsurgical levels. The   prevertebral soft tissues are unremarkable.    Evaluation the spinal canal is limited within the cervical spine due to   artifact from surgical hardware.      IMPRESSION:    1. Since 11/16/2022, no acute fracture or traumatic malalignment of the   cervical spine.    2. Again noted to be status post fusion C3-T1 without evidence of   hardware complication.        ******PRELIMINARY REPORT******      ******PRELIMINARY REPORT******       YARY VENTURA MD; Resident Radiologist  This document is a PRELIMINARY interpretation and is pending final   attending approval. Mar 27 2023  9:56PM    < end of copied text >      -CT wet read pelvis  R sided acetabular anterior column fx, bilateral OA both hips. No dislocation or intraarticular loose bodies. R side Displacement of roof <2mm. No marginal impaction    A/P: 89yMale s/p fall off physician exam table presenting with R side pelvic fx  -frailty, med admit  -Toe touch weight bearing  -non-operative management  -PT eval  - Patient history, exam, imaging and plan as per discussion with Dr. Cifuentes      Ortho Pager [phone]   Orthopaedic Surgery Consult Note    For Surgeon: Vane      Patient is a 89y old  Male who presents with a chief complaint of fall on r hip and head off of Dr. office table. States no LOC. pain in distribution of R hip without radiation, tingling.       Allergies    penicillin (Rash)    Intolerances        MEDICATIONS  (STANDING):    MEDICATIONS  (PRN):      PAST MEDICAL & SURGICAL HISTORY:  Hypothyroidism      HTN (hypertension)      Esophageal spasm      BPH (benign prostatic hyperplasia)      BPH (benign prostatic hyperplasia)      No significant past surgical history          Social History:      FAMILY HISTORY:        Vital Signs Last 24 Hrs  T(C): 36.3 (27 Mar 2023 18:56), Max: 36.3 (27 Mar 2023 18:56)  T(F): 97.4 (27 Mar 2023 18:56), Max: 97.4 (27 Mar 2023 18:56)  HR: 56 (27 Mar 2023 18:56) (56 - 56)  BP: 137/67 (27 Mar 2023 18:56) (137/67 - 137/67)  BP(mean): --  RR: 16 (27 Mar 2023 18:56) (16 - 16)  SpO2: 95% (27 Mar 2023 18:56) (95% - 95%)    Parameters below as of 27 Mar 2023 18:56  Patient On (Oxygen Delivery Method): room air        PE:  B/L LE: sensation intact  DP 2+  brisk cap refill  EHL/FHL/TA/GS firing bilaterally R < L R 2/2 pain  point tenderness in ASIS R side  skin intact                          13.3   18.02 )-----------( 280      ( 27 Mar 2023 19:20 )             41.9     03-27    138  |  102  |  31<H>  ----------------------------<  107<H>  4.6   |  27  |  0.82    Ca    9.1      27 Mar 2023 19:20        Imaging:   < from: CT Cervical Spine No Cont (03.27.23 @ 20:57) >    ******PRELIMINARY REPORT******      ******PRELIMINARY REPORT******         ACC: [id number] EXAM:  CT CERVICAL SPINE   ORDERED BY: ALEX OLIVARES     PROCEDURE DATE:  03/27/2023    ******PRELIMINARY REPORT******      ******PRELIMINARY REPORT******           INTERPRETATION:  PROCEDURE: CT Cervical spine without contrast    INDICATION: Trauma; status post fall.    TECHNIQUE: Multiple axial sections were obtained from the mid orbits to   the sternoclavicular joint. Sagittal and coronal reformats wereobtained   from the axial data set. The images were reviewed in soft tissue and bone   windows.    COMPARISON: CT cervical spine 11/16/2022.    FINDINGS:    Status post fusion spanning C3-T1. There is posterior fusion C3-T1 with   bilateral mass screws at C3-C5 and bilateral transpedicular screws at   C7-T1 with vertical supporting rods. Status post anterior cervical   discectomy fusion spanning C4-C7 with ventral plate and screw fixation   and interbody cage devices at C4-5 through C6-7. There is no evidence of   hardware fracture. Status post decompressive laminectomy at C5-C6. The   hardware is in anatomic alignment.    There is a chronic T1 compression fracture which is stable from   11/16/2022. The remainder the vertebral body heights are maintained.   There is multilevel disc space narrowing of the nonsurgical levels. The   prevertebral soft tissues are unremarkable.    Evaluation the spinal canal is limited within the cervical spine due to   artifact from surgical hardware.      IMPRESSION:    1. Since 11/16/2022, no acute fracture or traumatic malalignment of the   cervical spine.    2. Again noted to be status post fusion C3-T1 without evidence of   hardware complication.        ******PRELIMINARY REPORT******      ******PRELIMINARY REPORT******       YARY VENTURA MD; Resident Radiologist  This document is a PRELIMINARY interpretation and is pending final   attending approval. Mar 27 2023  9:56PM    < end of copied text >      -CT wet read pelvis  R sided acetabular anterior column fx, bilateral OA both hips. No dislocation or intraarticular loose bodies. R side Displacement of roof <2mm. No marginal impaction     vrad read  1. Acute comminuted fractures of the anterior and superior walls of the right  acetabulum.  2. Acute nondisplaced right sacral body fracture.  3. Acute right superior and inferior pubic rami fractures.  4. Moderate to large degree of right-sided pelvic sidewall hemorrhage  extending along the right lateral aspect of the urinary bladder.  5. Right obturator internus intramuscular hematoma.    A/P: 89yMale s/p fall off physician exam table presenting with R side pelvic fx  -frailty, med admit  -Toe touch weight bearing  -non-operative management  -PT eval  -DVT proph: primary team dispo  - Patient history, exam, imaging and plan as per discussion with Dr. Cifuentes      Ortho Pager [phone]   Orthopaedic Surgery Consult Note    For Surgeon: Vane      Patient is a 89y old  Male pmh HTN, hypothyroid, esophageal spasm, BPH, urinary retention requiring self-cath, SBO s/p partial resection, and malnutrition with PEG tube,  who presents with a chief complaint of fall on r hip and head off of Dr. office table. States no LOC. pain in distribution of R hip without radiation, tingling.       Allergies    penicillin (Rash)    Intolerances        MEDICATIONS  (STANDING):    MEDICATIONS  (PRN):      PAST MEDICAL & SURGICAL HISTORY:  Hypothyroidism      HTN (hypertension)      Esophageal spasm      BPH (benign prostatic hyperplasia)      BPH (benign prostatic hyperplasia)      No significant past surgical history          Social History:      FAMILY HISTORY:        Vital Signs Last 24 Hrs  T(C): 36.3 (27 Mar 2023 18:56), Max: 36.3 (27 Mar 2023 18:56)  T(F): 97.4 (27 Mar 2023 18:56), Max: 97.4 (27 Mar 2023 18:56)  HR: 56 (27 Mar 2023 18:56) (56 - 56)  BP: 137/67 (27 Mar 2023 18:56) (137/67 - 137/67)  BP(mean): --  RR: 16 (27 Mar 2023 18:56) (16 - 16)  SpO2: 95% (27 Mar 2023 18:56) (95% - 95%)    Parameters below as of 27 Mar 2023 18:56  Patient On (Oxygen Delivery Method): room air        PE:  B/L LE: sensation intact  DP 2+  brisk cap refill  EHL/FHL/TA/GS firing bilaterally R < L R 2/2 pain  point tenderness in ASIS R side  skin intact                          13.3   18.02 )-----------( 280      ( 27 Mar 2023 19:20 )             41.9     03-27    138  |  102  |  31<H>  ----------------------------<  107<H>  4.6   |  27  |  0.82    Ca    9.1      27 Mar 2023 19:20        Imaging:   < from: CT Cervical Spine No Cont (03.27.23 @ 20:57) >    ******PRELIMINARY REPORT******      ******PRELIMINARY REPORT******         ACC: [id number] EXAM:  CT CERVICAL SPINE   ORDERED BY: ALEX OLIVARES     PROCEDURE DATE:  03/27/2023    ******PRELIMINARY REPORT******      ******PRELIMINARY REPORT******           INTERPRETATION:  PROCEDURE: CT Cervical spine without contrast    INDICATION: Trauma; status post fall.    TECHNIQUE: Multiple axial sections were obtained from the mid orbits to   the sternoclavicular joint. Sagittal and coronal reformats wereobtained   from the axial data set. The images were reviewed in soft tissue and bone   windows.    COMPARISON: CT cervical spine 11/16/2022.    FINDINGS:    Status post fusion spanning C3-T1. There is posterior fusion C3-T1 with   bilateral mass screws at C3-C5 and bilateral transpedicular screws at   C7-T1 with vertical supporting rods. Status post anterior cervical   discectomy fusion spanning C4-C7 with ventral plate and screw fixation   and interbody cage devices at C4-5 through C6-7. There is no evidence of   hardware fracture. Status post decompressive laminectomy at C5-C6. The   hardware is in anatomic alignment.    There is a chronic T1 compression fracture which is stable from   11/16/2022. The remainder the vertebral body heights are maintained.   There is multilevel disc space narrowing of the nonsurgical levels. The   prevertebral soft tissues are unremarkable.    Evaluation the spinal canal is limited within the cervical spine due to   artifact from surgical hardware.      IMPRESSION:    1. Since 11/16/2022, no acute fracture or traumatic malalignment of the   cervical spine.    2. Again noted to be status post fusion C3-T1 without evidence of   hardware complication.        ******PRELIMINARY REPORT******      ******PRELIMINARY REPORT******       YARY VENTURA MD; Resident Radiologist  This document is a PRELIMINARY interpretation and is pending final   attending approval. Mar 27 2023  9:56PM    < end of copied text >      -CT wet read pelvis  R sided acetabular anterior column fx, bilateral OA both hips. No dislocation or intraarticular loose bodies. R side Displacement of roof <2mm. No marginal impaction     vrad read  1. Acute comminuted fractures of the anterior and superior walls of the right  acetabulum.  2. Acute nondisplaced right sacral body fracture.  3. Acute right superior and inferior pubic rami fractures.  4. Moderate to large degree of right-sided pelvic sidewall hemorrhage  extending along the right lateral aspect of the urinary bladder.  5. Right obturator internus intramuscular hematoma.    A/P: 89yMale PMH HTN, hypothyroid, esophageal spasm, BPH, urinary retention requiring self-cath, SBO s/p partial resection, and malnutrition with PEG tube,   s/p fall off physician exam table presenting with R side acetabular fx  -frailty, med admit  -Toe touch weight bearing r side  -non-operative management  -PT eval  -DVT proph: primary team dispo  - Patient history, exam, imaging and plan as per discussion with Dr. Cifuentes      Ortho Pager [phone]

## 2023-03-27 NOTE — ED ADULT NURSE NOTE - OBJECTIVE STATEMENT
Pt presents alert and oriented X3. Pt reports he fell out of the exam bed table at the doctor's office around 1600 today. Pt denies lost of LOC. + head injury w bump to right forehead and right head. Severe pain to right hip only w movement. Unable to fully assess body. Wife refuses gown for pt, pt agreed. G-tube to RUQ intact.

## 2023-03-27 NOTE — ED ADULT NURSE NOTE - NSFALLRSKPASTHIST_ED_ALL_ED
Last 5 Patient Entered Readings                                      Current 30 Day Average: 132/73     Recent Readings 7/28/2019 7/28/2019 7/23/2019 7/21/2019 7/21/2019    SBP (mmHg) 145 143 130 134 141    DBP (mmHg) 74 74 68 72 78    Pulse 60 60 58 58 56        Mr. Victoria' BP has trended up over the past few weeks. He had Chinese food recently. He will work on monitoring his salt intake more closely to ensure BP remains controlled. He goes deer hunting for most of Oct-Dec, so will follow up before this time frame, as he is unreachable while out hunting.     Per 30 day average, 132/73 mmHg, patient's BP is approaching goal.     Will continue to monitor regularly. Will follow up in 2-3 months, sooner if BP begins to trend upward or downward.    Asked patient to call or message with questions or concerns.     Current HTN regimen:  Hypertension Medications             amlodipine-valsartan (EXFORGE)  mg per tablet Take 1 tablet by mouth once daily.    hydroCHLOROthiazide (HYDRODIURIL) 25 MG tablet TAKE 1 TABLET BY MOUTH EVERY DAY    metoprolol succinate (TOPROL-XL) 200 MG 24 hr tablet TAKE 1 TABLET BY MOUTH EVERY DAY IN THE EVENING                         yes

## 2023-03-27 NOTE — CONSULT NOTE ADULT - ATTENDING COMMENTS
Patient seen and examined at bedside.     Agree with above plan for non-operative management of right nondisplaced acetabular fracture. TTWB if patient able. If not, can progress to 50% partial. Will need rehab. All questions answered.

## 2023-03-27 NOTE — ED ADULT TRIAGE NOTE - INTERNATIONAL TRAVEL
No Double Island Pedicle Flap Text: The defect edges were debeveled with a #15 scalpel blade.  Given the location of the defect, shape of the defect and the proximity to free margins a double island pedicle advancement flap was deemed most appropriate.  Using a sterile surgical marker, an appropriate advancement flap was drawn incorporating the defect, outlining the appropriate donor tissue and placing the expected incisions within the relaxed skin tension lines where possible.    The area thus outlined was incised deep to adipose tissue with a #15 scalpel blade.  The skin margins were undermined to an appropriate distance in all directions around the primary defect and laterally outward around the island pedicle utilizing iris scissors.  There was minimal undermining beneath the pedicle flap. The wound edges were sutured in a layered fashion.

## 2023-03-28 ENCOUNTER — INPATIENT (INPATIENT)
Facility: HOSPITAL | Age: 88
LOS: 5 days | Discharge: HOME | DRG: 964 | End: 2023-04-03
Attending: INTERNAL MEDICINE | Admitting: GENERAL ACUTE CARE HOSPITAL
Payer: MEDICARE

## 2023-03-28 DIAGNOSIS — I48.0 PAROXYSMAL ATRIAL FIBRILLATION: ICD-10-CM

## 2023-03-28 DIAGNOSIS — E03.9 HYPOTHYROIDISM, UNSPECIFIED: ICD-10-CM

## 2023-03-28 DIAGNOSIS — R33.9 RETENTION OF URINE, UNSPECIFIED: ICD-10-CM

## 2023-03-28 DIAGNOSIS — T14.8XXA OTHER INJURY OF UNSPECIFIED BODY REGION, INITIAL ENCOUNTER: ICD-10-CM

## 2023-03-28 DIAGNOSIS — Z86.711 PERSONAL HISTORY OF PULMONARY EMBOLISM: ICD-10-CM

## 2023-03-28 DIAGNOSIS — R33.8 OTHER RETENTION OF URINE: ICD-10-CM

## 2023-03-28 DIAGNOSIS — Z29.9 ENCOUNTER FOR PROPHYLACTIC MEASURES, UNSPECIFIED: ICD-10-CM

## 2023-03-28 DIAGNOSIS — N40.0 BENIGN PROSTATIC HYPERPLASIA WITHOUT LOWER URINARY TRACT SYMPTOMS: ICD-10-CM

## 2023-03-28 DIAGNOSIS — S32.591A OTHER SPECIFIED FRACTURE OF RIGHT PUBIS, INITIAL ENCOUNTER FOR CLOSED FRACTURE: ICD-10-CM
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
## 2023-03-28 DIAGNOSIS — I10 ESSENTIAL (PRIMARY) HYPERTENSION: ICD-10-CM

## 2023-03-28 DIAGNOSIS — D72.829 ELEVATED WHITE BLOOD CELL COUNT, UNSPECIFIED: ICD-10-CM

## 2023-03-28 DIAGNOSIS — S32.10XA UNSPECIFIED FRACTURE OF SACRUM, INITIAL ENCOUNTER FOR CLOSED FRACTURE: ICD-10-CM

## 2023-03-28 DIAGNOSIS — S32.401A UNSPECIFIED FRACTURE OF RIGHT ACETABULUM, INITIAL ENCOUNTER FOR CLOSED FRACTURE: ICD-10-CM

## 2023-03-28 DIAGNOSIS — Z88.0 ALLERGY STATUS TO PENICILLIN: ICD-10-CM
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
## 2023-03-28 LAB
ALBUMIN SERPL ELPH-MCNC: 3.3 G/DL — SIGNIFICANT CHANGE UP (ref 3.3–5)
ALP SERPL-CCNC: 72 U/L — SIGNIFICANT CHANGE UP (ref 40–120)
ALT FLD-CCNC: 26 U/L — SIGNIFICANT CHANGE UP (ref 10–45)
ANION GAP SERPL CALC-SCNC: 9 MMOL/L — SIGNIFICANT CHANGE UP (ref 5–17)
AST SERPL-CCNC: 25 U/L — SIGNIFICANT CHANGE UP (ref 10–40)
BASOPHILS # BLD AUTO: 0.04 K/UL — SIGNIFICANT CHANGE UP (ref 0–0.2)
BASOPHILS NFR BLD AUTO: 0.3 % — SIGNIFICANT CHANGE UP (ref 0–2)
BILIRUB SERPL-MCNC: 0.5 MG/DL — SIGNIFICANT CHANGE UP (ref 0.2–1.2)
BLD GP AB SCN SERPL QL: NEGATIVE — SIGNIFICANT CHANGE UP
BUN SERPL-MCNC: 28 MG/DL — HIGH (ref 7–23)
CALCIUM SERPL-MCNC: 8.8 MG/DL — SIGNIFICANT CHANGE UP (ref 8.4–10.5)
CHLORIDE SERPL-SCNC: 102 MMOL/L — SIGNIFICANT CHANGE UP (ref 96–108)
CO2 SERPL-SCNC: 25 MMOL/L — SIGNIFICANT CHANGE UP (ref 22–31)
CREAT SERPL-MCNC: 0.78 MG/DL — SIGNIFICANT CHANGE UP (ref 0.5–1.3)
EGFR: 84 ML/MIN/1.73M2 — SIGNIFICANT CHANGE UP
EOSINOPHIL # BLD AUTO: 0.07 K/UL — SIGNIFICANT CHANGE UP (ref 0–0.5)
EOSINOPHIL NFR BLD AUTO: 0.6 % — SIGNIFICANT CHANGE UP (ref 0–6)
GLUCOSE SERPL-MCNC: 126 MG/DL — HIGH (ref 70–99)
HCT VFR BLD CALC: 38.6 % — LOW (ref 39–50)
HCT VFR BLD CALC: 41.3 % — SIGNIFICANT CHANGE UP (ref 39–50)
HGB BLD-MCNC: 12.4 G/DL — LOW (ref 13–17)
HGB BLD-MCNC: 13.1 G/DL — SIGNIFICANT CHANGE UP (ref 13–17)
IMM GRANULOCYTES NFR BLD AUTO: 0.4 % — SIGNIFICANT CHANGE UP (ref 0–0.9)
LYMPHOCYTES # BLD AUTO: 1.39 K/UL — SIGNIFICANT CHANGE UP (ref 1–3.3)
LYMPHOCYTES # BLD AUTO: 11.8 % — LOW (ref 13–44)
MAGNESIUM SERPL-MCNC: 2.1 MG/DL — SIGNIFICANT CHANGE UP (ref 1.6–2.6)
MCHC RBC-ENTMCNC: 27.6 PG — SIGNIFICANT CHANGE UP (ref 27–34)
MCHC RBC-ENTMCNC: 27.8 PG — SIGNIFICANT CHANGE UP (ref 27–34)
MCHC RBC-ENTMCNC: 31.7 GM/DL — LOW (ref 32–36)
MCHC RBC-ENTMCNC: 32.1 GM/DL — SIGNIFICANT CHANGE UP (ref 32–36)
MCV RBC AUTO: 86.5 FL — SIGNIFICANT CHANGE UP (ref 80–100)
MCV RBC AUTO: 86.9 FL — SIGNIFICANT CHANGE UP (ref 80–100)
MONOCYTES # BLD AUTO: 1.25 K/UL — HIGH (ref 0–0.9)
MONOCYTES NFR BLD AUTO: 10.6 % — SIGNIFICANT CHANGE UP (ref 2–14)
NEUTROPHILS # BLD AUTO: 8.97 K/UL — HIGH (ref 1.8–7.4)
NEUTROPHILS NFR BLD AUTO: 76.3 % — SIGNIFICANT CHANGE UP (ref 43–77)
NRBC # BLD: 0 /100 WBCS — SIGNIFICANT CHANGE UP (ref 0–0)
NRBC # BLD: 0 /100 WBCS — SIGNIFICANT CHANGE UP (ref 0–0)
PHOSPHATE SERPL-MCNC: 4 MG/DL — SIGNIFICANT CHANGE UP (ref 2.5–4.5)
PLATELET # BLD AUTO: 222 K/UL — SIGNIFICANT CHANGE UP (ref 150–400)
PLATELET # BLD AUTO: 229 K/UL — SIGNIFICANT CHANGE UP (ref 150–400)
POTASSIUM SERPL-MCNC: 4 MMOL/L — SIGNIFICANT CHANGE UP (ref 3.5–5.3)
POTASSIUM SERPL-SCNC: 4 MMOL/L — SIGNIFICANT CHANGE UP (ref 3.5–5.3)
PROT SERPL-MCNC: 6.8 G/DL — SIGNIFICANT CHANGE UP (ref 6–8.3)
RBC # BLD: 4.46 M/UL — SIGNIFICANT CHANGE UP (ref 4.2–5.8)
RBC # BLD: 4.75 M/UL — SIGNIFICANT CHANGE UP (ref 4.2–5.8)
RBC # FLD: 14.5 % — SIGNIFICANT CHANGE UP (ref 10.3–14.5)
RBC # FLD: 14.7 % — HIGH (ref 10.3–14.5)
RH IG SCN BLD-IMP: POSITIVE — SIGNIFICANT CHANGE UP
SODIUM SERPL-SCNC: 136 MMOL/L — SIGNIFICANT CHANGE UP (ref 135–145)
WBC # BLD: 11.7 K/UL — HIGH (ref 3.8–10.5)
WBC # BLD: 11.77 K/UL — HIGH (ref 3.8–10.5)
WBC # FLD AUTO: 11.7 K/UL — HIGH (ref 3.8–10.5)
WBC # FLD AUTO: 11.77 K/UL — HIGH (ref 3.8–10.5)

## 2023-03-28 PROCEDURE — 99222 1ST HOSP IP/OBS MODERATE 55: CPT

## 2023-03-28 RX ORDER — METOCLOPRAMIDE HCL 10 MG
10 TABLET ORAL EVERY 8 HOURS
Refills: 0 | Status: DISCONTINUED | OUTPATIENT
Start: 2023-03-28 | End: 2023-04-03

## 2023-03-28 RX ORDER — FINASTERIDE 5 MG/1
1 TABLET, FILM COATED ORAL
Qty: 0 | Refills: 0 | DISCHARGE

## 2023-03-28 RX ORDER — METOPROLOL TARTRATE 50 MG
0.5 TABLET ORAL
Refills: 0 | DISCHARGE

## 2023-03-28 RX ORDER — METOPROLOL TARTRATE 50 MG
12.5 TABLET ORAL EVERY 12 HOURS
Refills: 0 | Status: DISCONTINUED | OUTPATIENT
Start: 2023-03-28 | End: 2023-03-28

## 2023-03-28 RX ORDER — APIXABAN 2.5 MG/1
1 TABLET, FILM COATED ORAL
Refills: 0 | DISCHARGE

## 2023-03-28 RX ORDER — BENAZEPRIL HYDROCHLORIDE 40 MG/1
1 TABLET ORAL
Qty: 0 | Refills: 0 | DISCHARGE

## 2023-03-28 RX ORDER — OXYCODONE HYDROCHLORIDE 5 MG/1
5 TABLET ORAL EVERY 8 HOURS
Refills: 0 | Status: DISCONTINUED | OUTPATIENT
Start: 2023-03-28 | End: 2023-03-28

## 2023-03-28 RX ORDER — METOPROLOL TARTRATE 50 MG
6.25 TABLET ORAL EVERY 12 HOURS
Refills: 0 | Status: DISCONTINUED | OUTPATIENT
Start: 2023-03-28 | End: 2023-03-28

## 2023-03-28 RX ORDER — FAMOTIDINE 10 MG/ML
20 INJECTION INTRAVENOUS
Refills: 0 | Status: DISCONTINUED | OUTPATIENT
Start: 2023-03-28 | End: 2023-03-30

## 2023-03-28 RX ORDER — METOPROLOL TARTRATE 50 MG
12.5 TABLET ORAL EVERY 8 HOURS
Refills: 0 | Status: DISCONTINUED | OUTPATIENT
Start: 2023-03-28 | End: 2023-04-03

## 2023-03-28 RX ORDER — SENNA PLUS 8.6 MG/1
2 TABLET ORAL AT BEDTIME
Refills: 0 | Status: DISCONTINUED | OUTPATIENT
Start: 2023-03-28 | End: 2023-04-03

## 2023-03-28 RX ORDER — LEVOTHYROXINE SODIUM 125 MCG
1 TABLET ORAL
Refills: 0 | DISCHARGE

## 2023-03-28 RX ORDER — POLYETHYLENE GLYCOL 3350 17 G/17G
17 POWDER, FOR SOLUTION ORAL DAILY
Refills: 0 | Status: DISCONTINUED | OUTPATIENT
Start: 2023-03-28 | End: 2023-04-03

## 2023-03-28 RX ORDER — SODIUM CHLORIDE 9 MG/ML
4 INJECTION INTRAMUSCULAR; INTRAVENOUS; SUBCUTANEOUS EVERY 24 HOURS
Refills: 0 | Status: DISCONTINUED | OUTPATIENT
Start: 2023-03-28 | End: 2023-03-30

## 2023-03-28 RX ORDER — SILODOSIN 4 MG/1
1 CAPSULE ORAL
Qty: 0 | Refills: 0 | DISCHARGE

## 2023-03-28 RX ORDER — ACETAMINOPHEN 500 MG
650 TABLET ORAL EVERY 6 HOURS
Refills: 0 | Status: DISCONTINUED | OUTPATIENT
Start: 2023-03-28 | End: 2023-04-03

## 2023-03-28 RX ORDER — HEPARIN SODIUM 5000 [USP'U]/ML
5000 INJECTION INTRAVENOUS; SUBCUTANEOUS EVERY 8 HOURS
Refills: 0 | Status: DISCONTINUED | OUTPATIENT
Start: 2023-03-28 | End: 2023-04-03

## 2023-03-28 RX ORDER — PANTOPRAZOLE SODIUM 20 MG/1
40 TABLET, DELAYED RELEASE ORAL
Refills: 0 | Status: DISCONTINUED | OUTPATIENT
Start: 2023-03-28 | End: 2023-04-03

## 2023-03-28 RX ORDER — LEVOTHYROXINE SODIUM 125 MCG
75 TABLET ORAL DAILY
Refills: 0 | Status: DISCONTINUED | OUTPATIENT
Start: 2023-03-28 | End: 2023-04-03

## 2023-03-28 RX ORDER — INFLUENZA VIRUS VACCINE 15; 15; 15; 15 UG/.5ML; UG/.5ML; UG/.5ML; UG/.5ML
0.7 SUSPENSION INTRAMUSCULAR ONCE
Refills: 0 | Status: COMPLETED | OUTPATIENT
Start: 2023-03-28 | End: 2023-03-28

## 2023-03-28 RX ORDER — FINASTERIDE 5 MG/1
5 TABLET, FILM COATED ORAL DAILY
Refills: 0 | Status: DISCONTINUED | OUTPATIENT
Start: 2023-03-28 | End: 2023-04-03

## 2023-03-28 RX ADMIN — HEPARIN SODIUM 5000 UNIT(S): 5000 INJECTION INTRAVENOUS; SUBCUTANEOUS at 21:39

## 2023-03-28 RX ADMIN — FAMOTIDINE 20 MILLIGRAM(S): 10 INJECTION INTRAVENOUS at 07:45

## 2023-03-28 RX ADMIN — Medication 12.5 MILLIGRAM(S): at 02:23

## 2023-03-28 RX ADMIN — Medication 75 MICROGRAM(S): at 22:21

## 2023-03-28 RX ADMIN — FINASTERIDE 5 MILLIGRAM(S): 5 TABLET, FILM COATED ORAL at 12:02

## 2023-03-28 RX ADMIN — Medication 10 MILLIGRAM(S): at 22:26

## 2023-03-28 RX ADMIN — Medication 12.5 MILLIGRAM(S): at 22:02

## 2023-03-28 RX ADMIN — POLYETHYLENE GLYCOL 3350 17 GRAM(S): 17 POWDER, FOR SOLUTION ORAL at 12:02

## 2023-03-28 NOTE — PHYSICAL THERAPY INITIAL EVALUATION ADULT - TRANSFER SAFETY CONCERNS NOTED: SIT/STAND, REHAB EVAL
Demo fair adherence to TTWB RLE. Impaired eccentric control during descend. Significant increase in pain when standing. Mod verbal and tactile cues to adhere to TTWB RLE./decreased weight-shifting ability

## 2023-03-28 NOTE — PROGRESS NOTE ADULT - PROBLEM SELECTOR PLAN 11
Pt has hx or PEs, on apixaban 2.5mg PO q12hrs    Plan:  - hold apixaban for re-evaluation in AM Pt has hx or PEs    Plan:  - f/u outpatient pulmonologist Prior history of PE, no longer taking eliquis.     - f/u outpatient pulmonologist Dr. Irving Wilson

## 2023-03-28 NOTE — H&P ADULT - PROBLEM SELECTOR PLAN 4
hx of BPH, pt self caths 4x a day    Plan:  - intermittent cath q6hrs  - BUS q6hrs Home meds: lopressor 12.5mg PO q12hrs    Plan:  - cont lopressor 12.5mg PO q12hrs CT Pelvis:  Acute comminuted fractures of the anterior and superior walls of the right acetabulum. Acute nondisplaced right sacral body fracture. Acute right superior and inferior pubic rami fractures. Moderate to large degree of right-sided pelvic sidewall hemorrhage extending along the right lateral aspect of the urinary bladder. Right obturator internus intramuscular hematoma.    Plan:  - Monitor CBC  - Home BB for now in order to avoid masking HD change 2/2 to bleeding  - Maintain active T&S    #R pelvis hemorrhage  imaging performed w/o IV contrast, so unclear of source of bleeding    Plan:  - F/U U/A to rule out hematuria -- if present, would consult urology for concern of bleeding into bladder

## 2023-03-28 NOTE — PROGRESS NOTE ADULT - PROBLEM SELECTOR PLAN 7
Home meds: lopressor 12.5mg PO q12hrs    Plan:  - hold lopressor 12.5mg PO q12hrs, to avoid masking change in hemodynamics iso hemorrhage Home meds: lopressor 12.5mg PO q12hrs.     Plan:  - Restart 12.5mg PO q12hrs Hx of paroxysmal atrial fibrillation, no longer on eliquis. Takes metoprolol 12.5mg once a day for rate control. NSR.  - c/w home medication

## 2023-03-28 NOTE — H&P ADULT - HISTORY OF PRESENT ILLNESS
89 y/o M pt with PM-Hx of HTN, hypothyroid, esophageal spasm, BPH, urinary retention requiring self-cath, SBO s/p partial resection, and malnutrition with PEG tube,    In the ED:  Initial vital signs: T: XX F, HR: XX, BP: XX, R: XX, SpO2: XX% on RA  Labs: significant for  Imaging:  CXR:   EKG:   Medications:   Consults: none  89 y/o M pt with PM-Hx of HTN, hypothyroid, esophageal spasm, BPH, urinary retention requiring self-cath, SBO s/p partial resection, and malnutrition with PEG tube -- no longer in use. Pt presents to the ED following fall off of examination bed at doctor's appointment. Pt was in normal state of health prior to fall. Pt complains of R hip pain with flexion at hip, however no other complaints at this time. Pt denies fever, chills, N/V, abdominal pain, melena, hematochezia, new edema, new paresthesias.     Pt has a hx of multiple falls, including fall that resulted in multiple rib fractures and pneumothorax requiring intubation that resulted in esophageal injury. As a result, pt utilized a PEG for nutrition for a limited amount of time, he now tolerates PO in soft and bite sized.    In the ED:  Initial vital signs: T: 97.4F, HR: 16, BP: 137/67, R: 16, SpO2: 95% on RA  Labs: significant for WBC 18.02, BUN 31  Imaging:  CXR: no acute cardiopulmonary findings  CTH:  no acute intracranial hemorrhage or calvarial fracture, Possible mild contusive change along right frontal scalp.   CT cervical: post fusion C3-T1 without evidence of   hardware complication.  CT Pelvis:  Acute comminuted fractures of the anterior and superior walls of the right acetabulum. Acute nondisplaced right sacral body fracture. Acute right superior and inferior pubic rami fractures. Moderate to large degree of right-sided pelvic sidewall hemorrhage extending along the right lateral aspect of the urinary bladder. Right obturator internus intramuscular hematoma.  EKG:   Medications: acetaminophen 1g IV  Consults: none

## 2023-03-28 NOTE — PROGRESS NOTE ADULT - PROBLEM SELECTOR PLAN 6
Pt presenting with WBC of 18, afebrile. Otherwise, not meeting SIRS criteria. Likely 2/2 to trauma    Plan:   - Monitor CBC Pt presenting with WBC of 18, afebrile. Otherwise, not meeting SIRS criteria. Likely 2/2 to trauma    Plan:   - downtrending, continue to monitor

## 2023-03-28 NOTE — H&P ADULT - PROBLEM SELECTOR PLAN 6
Home meds: finasteride 5mg PO qd    Plan:  - Cont home meds Home med: levothyroxine 75mcg PO qd    Plan:  - Cont levothyroxine 75mcg PO qd Pt presenting with WBC of 18, afebrile. Otherwise, not meeting SIRS criteria. Likely 2/2 to trauma    Plan:   - Monitor CBC

## 2023-03-28 NOTE — H&P ADULT - ATTENDING COMMENTS
89M with PMH of HTN, hypothyroidism, BPH and SBO presenting after a fall at doctor's office, suffering R acetabular, R sacral and R pubic rami fractures.  Pelvic hematoma and intramuscular hematoma seen on imaging as well.     #Multiple fractures  #Pelvic hematoma  - Hemoglobin stable  - DVT risk is high, given presence of multiple fracture of large bones  - start cautiously on sqh for DVT ppx in the evening, if HH remains stable  - PT consult  - appreciate input from orthopedics    #Atrial fibrillation  - no longer on anticoagulation  - continue metoprolol    >65 minutes spent on this encounter, including face to face with patient, care coordination and documentation

## 2023-03-28 NOTE — H&P ADULT - NSHPLABSRESULTS_GEN_ALL_CORE
.  LABS:                         13.3   18.02 )-----------( 280      ( 27 Mar 2023 19:20 )             41.9     03-27    138  |  102  |  31<H>  ----------------------------<  107<H>  4.6   |  27  |  0.82    Ca    9.1      27 Mar 2023 19:20      PT/INR - ( 27 Mar 2023 19:20 )   PT: 12.6 sec;   INR: 1.06          PTT - ( 27 Mar 2023 19:20 )  PTT:33.2 sec          RADIOLOGY, EKG & ADDITIONAL TESTS: Reviewed.

## 2023-03-28 NOTE — PHYSICAL THERAPY INITIAL EVALUATION ADULT - GENERAL OBSERVATIONS, REHAB EVAL
PT IE completed. Patient received semi supine in bed +heplock IV, +(B) SCDs, NAD, willing to work with PT.

## 2023-03-28 NOTE — PHYSICAL THERAPY INITIAL EVALUATION ADULT - LEVEL OF INDEPENDENCE: GAIT, REHAB EVAL
2/2 poor adherence to TTWB RLE and significant increase in R hip pain. Patient requesting to return semi supine in bed/unable to perform

## 2023-03-28 NOTE — H&P ADULT - PROBLEM SELECTOR PLAN 8
F: none  E: replete K<4, Mg<2  N: soft & bite sized  D: hold apixaban     Code: FULL  Dispo: WILL Pt has hx or PEs, on apixaban 2.5mg PO q12hrs    Plan:  - hold apixaban for re-evaluation in AM hx of BPH, pt self caths 4x a day    Plan:  - intermittent cath q6hrs  - BUS q6hrs

## 2023-03-28 NOTE — PATIENT PROFILE ADULT - CENTRAL VENOUS CATHETER/PICC LINE
PT DAILY TREATMENT NOTE  NON [facility]     Patient Name: Herbert Schroeder  Date:2022  : 1985  [x]  Patient  Verified  Payor: Rosanna Maier / [place]: [place] [employer] / Product Type: Managed Care Medicaid /    Treatment Area: Other low back pain [M54.59]  Low back pain, unspecified [M54.50]  Other chronic pain [G89.29]       Next MD APPT: 22  In time: 7940 Out time: 1625    Total Treatment Time (min): 60  Visit #:      SUBJECTIVE  Pain Level (0-10 scale) pre treatment: 3/10       Pain Level (0-10 scale) post treatment: 0/10    Any medication changes, allergies to medications, adverse drug reactions, diagnosis change, or new procedure performed?:   [x] No    [] Yes (see summary sheet for update)    Subjective functional status/changes:   [x] No changes reported  Pt reports he feels about the same, just tightness in the low back area. Pt has not been able to start work yet due to his employer not having all his information yet. Returns to the Dr next week and thinks the Dr will want him to continue.   Pt is going to a spine specialist  OBJECTIVE    Modality rationale: decrease edema, decrease inflammation, decrease pain, increase tissue extensibility and increase muscle contraction/control to improve the patients ability to reduce pain with ambulation and ADLs   Min Type Additional Details   15 [x] Estim: [x]UnAtt   []Att       []TENS instruct                  [x]IFC  []Premod   []NMES                     []Other:  []w/US   []w/ice   [x]w/heat  Position:supine with wedge   Location:lower back     []  Ice     [x]  Heat  []  Ice massage Position: seated   Location: lower back     []  Traction: [] Cervical       []Lumbar                       [] Prone          []Supine                       []Intermittent   []Continuous Lbs:  []w/heat  []W/heat and Estim    []  Ultrasound: []Continuous   [] Pulsed at:                           []1MHz   []3MHz Location:  W/cm2:      [x] Skin assessment post-treatment: [x]intact  []redness- no adverse reaction   []redness  adverse reaction:   25 min Therapeutic Exercise:  [x] See flow sheet :   Rationale: increase ROM, increase strength, improve coordination, improve balance and increase proprioception to improve the patients ability to ambulate with less back pain. 15 min Neuromuscular Re-education:  [x]  See flow sheet : PPT, quadruped exercises , core strengthening   Rationale: increase ROM, increase strength, improve coordination, improve balance and increase proprioception  to improve the patients ability to reduce pain with ambulation and ADLs      With   [x] TE   [] TA   [] Neuro   [] SC   [] other: Patient Education: [] Review HEP    [] Progressed/Changed HEP based on:   [] positioning   [] body mechanics   [] transfers   [] Use of heat/ice     [x] other: Need to work on stretching        Other Objective/Functional Measures:   Continued with ex per flow sheet, added alt arm /leg raises in quadruped. Pt demonstrated instability due to core weakness. ASSESSMENT/Changes in Function:   The pt demonstrated some improvement in PPT today, needing decreased cues. The pt does have decreased mobility of the lumbar spine/pelvis. Hip flexor/HS/paraspinal tightness. Patient will continue to benefit from skilled PT services to modify and progress therapeutic interventions, address functional mobility deficits, address ROM deficits, address strength deficits, analyze and address soft tissue restrictions, analyze and cue movement patterns, analyze and modify body mechanics/ergonomics and assess and modify postural abnormalities to attain remaining goals. GOALS/Progress towards goals:    Patient Goal (s): more flexibility    []? Met []? Not met []? Partially met   Short Term Goals: To be accomplished in 6 treatments. 1. Patient will report compliance to a progressive HEP. [x] Met [] Not met [] Partially met 4/13   2.  Patient will report decrease in pain intensity to a maximum of 5/10. [] Met [] Not met [] Partially met   3. Patient will demonstrate understanding of proper body mechanics for bending and lifting light objects. [] Met [] Not met [] Partially met   Long Term Goals: To be accomplished in 12-18 treatments. 1. Patient will stand up from a chair without increased pain symptoms. 2. Patient will tolerate ambulating community distances without increased pain. 3. Patient will demo ability to  >20 lbs from floor without increased pain. 4. Patient will report decrease in overall pain to average of 1-2/10 at maximum.     Frequency / Duration: Patient to be seen 2 times per week for 12-18 treatments.   Certification Period: 4/7/22 - 7/6/22    PLAN  [x]  Continue plan of care  [x]  Upgrade activities as tolerated      [x]  Update interventions per flow sheet       []  Discharge due to:  [x]  Other: continue with body mechanics training, strengthening of core/extensors, Re eval next session 5/11/22    Marcella Daley, PT 5/6/2022 no

## 2023-03-28 NOTE — H&P ADULT - ASSESSMENT
89 y/o M pt with PM-Hx of HTN, hypothyroid, esophageal spasm, BPH, urinary retention requiring self-cath, SBO s/p partial resection, presents following fall, found to have R side acetabular fracture, non-surgical. Admitted to [facility] for medical management   89 y/o M pt with PM-Hx of HTN, hypothyroid, esophageal spasm, BPH, urinary retention requiring self-cath, SBO s/p partial resection, presents following fall, found to have R side acetabular fracture, R sacral fracture and R rami fractures, non-surgical per ortho. Admitted to [facility] for medical management

## 2023-03-28 NOTE — PROGRESS NOTE ADULT - PROBLEM SELECTOR PLAN 1
S/P mechanical fall, non-surgical management per ortho.   CT Pelvis:  Acute comminuted fractures of the anterior and superior walls of the right acetabulum. Acute nondisplaced right sacral body fracture. Acute right superior and inferior pubic rami fractures. Moderate to large degree of right-sided pelvic sidewall hemorrhage extending along the right lateral aspect of the urinary bladder. Right obturator internus intramuscular hematoma.    Plan:  - f/u ortho recs  - pain mgmt: tylenol 650mg PO q6hrs  - PT eval S/P mechanical fall, non-surgical management per ortho.   CT Pelvis:  Acute comminuted fractures of the anterior and superior walls of the right acetabulum. Acute nondisplaced right sacral body fracture. Acute right superior and inferior pubic rami fractures. Moderate to large degree of right-sided pelvic sidewall hemorrhage extending along the right lateral aspect of the urinary bladder. Right obturator internus intramuscular hematoma.    Plan:  - f/u ortho recs  - pain mgmt: Tylenol 650mg PO q6hrs  - PT eval

## 2023-03-28 NOTE — PATIENT PROFILE ADULT - FALL HARM RISK - HARM RISK INTERVENTIONS

## 2023-03-28 NOTE — H&P ADULT - PROBLEM SELECTOR PLAN 7
Pt has hx or PEs, on apixaban 2.5mg PO q12hrs    Plan:  - hold apixaban for re-evaluation in AM Home meds: finasteride 5mg PO qd    Plan:  - Cont home meds Home meds: lopressor 12.5mg PO q12hrs    Plan:  - hold lopressor 12.5mg PO q12hrs, to avoid masking change in hemodynamics iso hemorrhage

## 2023-03-28 NOTE — H&P ADULT - PROBLEM SELECTOR PLAN 1
S/P mechanical fall, non-surgical management per ortho.   CT Pelvis:  Acute comminuted fractures of the anterior and superior walls of the right acetabulum. Acute nondisplaced right sacral body fracture. Acute right superior and inferior pubic rami fractures. Moderate to large degree of right-sided pelvic sidewall hemorrhage extending along the right lateral aspect of the urinary bladder. Right obturator internus intramuscular hematoma.    Plan:  - f/u ortho recs  - pain mgmt: tylenol 650mg PO q6hrs  - PT eval

## 2023-03-28 NOTE — H&P ADULT - PROBLEM SELECTOR PLAN 5
Home med: levothyroxine 75mcg PO qd    Plan:  - Cont levothyroxine 75mcg PO qd hx of BPH, pt self caths 4x a day    Plan:  - intermittent cath q6hrs  - BUS q6hrs Pt has hx of multiple falls.  - Plan as above

## 2023-03-28 NOTE — PROGRESS NOTE ADULT - PROBLEM SELECTOR PLAN 8
hx of BPH, pt self caths 4x a day    Plan:  - intermittent cath q6hrs  - BUS q6hrs hx of BPH, pt self caths 2-4x a day    Plan:  - intermittent cath q6hrs  - BUS q6hrs

## 2023-03-28 NOTE — H&P ADULT - PROBLEM SELECTOR PLAN 3
Home meds: lopressor 12.5mg PO q12hrs    Plan:  - cont lopressor 12.5mg PO q12hrs Pt presenting with WBC of 18, afebrile. Otherwise, not meeting SIRS criteria. Likely 2/2 to trauma    Plan:   - Monitor CBC - plan as above

## 2023-03-28 NOTE — PHYSICAL THERAPY INITIAL EVALUATION ADULT - PERTINENT HX OF CURRENT PROBLEM, REHAB EVAL
89 y/o M pt with PM-Hx of HTN, hypothyroid, esophageal spasm, BPH, urinary retention requiring self-cath, SBO s/p partial resection, presents following fall, found to have R side acetabular fracture, R sacral fracture and R rami fractures, non-surgical per ortho. Admitted to [email or web address] for medical management

## 2023-03-28 NOTE — PHYSICAL THERAPY INITIAL EVALUATION ADULT - ADDITIONAL COMMENTS
Community ambulator who lives with his wife in an elevator access apartment, ~[address]. Ambulates with RW and SC and wife assist with all ADLs at home.

## 2023-03-28 NOTE — PHYSICAL THERAPY INITIAL EVALUATION ADULT - DID THE PATIENT HAVE SURGERY?
[address] [facility]  [facility]  [phone]  [phone]               Thank you for choosing us for your health care visit with ADRIEL Jacobs.   We are glad to serve you and happy to provide you n/a

## 2023-03-28 NOTE — PROGRESS NOTE ADULT - PROBLEM SELECTOR PLAN 9
Home med: levothyroxine 75mcg PO qd    Plan:  - Cont levothyroxine 75mcg PO qd Home med: levothyroxine 75mcg PO qd    Plan:  - Cont levothyroxine 75mcg PO qd  - f/u AM TSH/T4

## 2023-03-28 NOTE — PROGRESS NOTE ADULT - PROBLEM SELECTOR PLAN 12
F: none  E: replete K<4, Mg<2  N: soft & bite sized  D: hold apixaban     Code: FULL  Dispo: WILL F: none  E: replete K<4, Mg<2  N: soft & bite sized  DvT: SCD  Code: FULL  Dispo: [facility] [email or web address]]

## 2023-03-28 NOTE — H&P ADULT - PROBLEM SELECTOR PLAN 9
F: none  E: replete K<4, Mg<2  N: soft & bite sized  D: hold apixaban     Code: FULL  Dispo: WILL Home med: levothyroxine 75mcg PO qd    Plan:  - Cont levothyroxine 75mcg PO qd

## 2023-03-28 NOTE — PROGRESS NOTE ADULT - PROBLEM SELECTOR PLAN 4
CT Pelvis:  Acute comminuted fractures of the anterior and superior walls of the right acetabulum. Acute nondisplaced right sacral body fracture. Acute right superior and inferior pubic rami fractures. Moderate to large degree of right-sided pelvic sidewall hemorrhage extending along the right lateral aspect of the urinary bladder. Right obturator internus intramuscular hematoma.    Plan:  - Monitor CBC  - Home BB for now in order to avoid masking HD change 2/2 to bleeding  - Maintain active T&S    #R pelvis hemorrhage  imaging performed w/o IV contrast, so unclear of source of bleeding    Plan:  - F/U U/A to rule out hematuria -- if present, would consult urology for concern of bleeding into bladder CT Pelvis:  Acute comminuted fractures of the anterior and superior walls of the right acetabulum. Acute nondisplaced right sacral body fracture. Acute right superior and inferior pubic rami fractures. Moderate to large degree of right-sided pelvic sidewall hemorrhage extending along the right lateral aspect of the urinary bladder. Right obturator internus intramuscular hematoma.    Plan:  - Monitor CBC  - Maintain active T&S    #R pelvis hemorrhage  imaging performed w/o IV contrast, so unclear of source of bleeding    Plan:  - F/U U/A to rule out hematuria -- if present, would consult urology for concern of bleeding into bladder CT Pelvis:  Acute comminuted fractures of the anterior and superior walls of the right acetabulum. Acute nondisplaced right sacral body fracture. Acute right superior and inferior pubic rami fractures. Moderate to large degree of right-sided pelvic sidewall hemorrhage extending along the right lateral aspect of the urinary bladder. Right obturator internus intramuscular hematoma.    Plan:  - f/u repeat CBC 2pm  - Maintain active T&S    #R pelvis hemorrhage  imaging performed w/o IV contrast, so unclear of source of bleeding    Plan:  - F/U U/A to rule out hematuria -- if present, would consult urology for concern of bleeding into bladder

## 2023-03-28 NOTE — PROGRESS NOTE ADULT - SUBJECTIVE AND OBJECTIVE BOX
OVERNIGHT EVENTS:    SUBJECTIVE / INTERVAL HPI: Patient seen and examined at bedside.     VITAL SIGNS:  Vital Signs Last 24 Hrs  T(C): 36.7 (28 Mar 2023 02:04), Max: 36.7 (28 Mar 2023 02:04)  T(F): 98 (28 Mar 2023 02:04), Max: 98 (28 Mar 2023 02:04)  HR: 67 (28 Mar 2023 05:48) (56 - 67)  BP: 161/72 (28 Mar 2023 05:48) (137/67 - 168/95)  BP(mean): --  RR: 16 (28 Mar 2023 05:48) (16 - 16)  SpO2: 95% (28 Mar 2023 05:48) (95% - 95%)    Parameters below as of 28 Mar 2023 05:48  Patient On (Oxygen Delivery Method): room air      I&O's Summary      PHYSICAL EXAM:    General: WDWN  HEENT: NC/AT; PERRL, anicteric sclera; MMM  Neck: supple  Cardiovascular: +S1/S2; RRR  Respiratory: CTA B/L; no W/R/R  Gastrointestinal: soft, NT/ND; +BSx4  Extremities: WWP; no edema, clubbing or cyanosis  Vascular: 2+ radial, DP/PT pulses B/L  Neurological: AAOx3; no focal deficits    MEDICATIONS:  MEDICATIONS  (STANDING):  famotidine    Tablet 20 milliGRAM(s) Oral two times a day  finasteride 5 milliGRAM(s) Oral daily  influenza  Vaccine (HIGH DOSE) 0.7 milliLiter(s) IntraMuscular once  metoprolol tartrate 12.5 milliGRAM(s) Oral every 12 hours  polyethylene glycol 3350 17 Gram(s) Oral daily  senna 2 Tablet(s) Oral at bedtime    MEDICATIONS  (PRN):  acetaminophen     Tablet .. 650 milliGRAM(s) Oral every 6 hours PRN Temp greater or equal to 38C (100.4F), Mild Pain (1 - 3)      ALLERGIES:  Allergies    penicillin (Rash)    Intolerances        LABS:                        12.4   11.77 )-----------( 229      ( 28 Mar 2023 04:43 )             38.6     03-28    136  |  102  |  28<H>  ----------------------------<  126<H>  4.0   |  25  |  0.78    Ca    8.8      28 Mar 2023 04:43  Phos  4.0     03-28  Mg     2.1     03-28    TPro  6.8  /  Alb  3.3  /  TBili  0.5  /  DBili  x   /  AST  25  /  ALT  26  /  AlkPhos  72  03-28    PT/INR - ( 27 Mar 2023 19:20 )   PT: 12.6 sec;   INR: 1.06          PTT - ( 27 Mar 2023 19:20 )  PTT:33.2 sec    CAPILLARY BLOOD GLUCOSE          RADIOLOGY & ADDITIONAL TESTS: Reviewed.   OVERNIGHT EVENTS: No acute events overnight.     SUBJECTIVE / INTERVAL HPI: Patient seen and examined at bedside. Patient sitting comfortably in semi fowlers position. Pt's wife states that he was at Dr. Villatoro's office yesterday for a f/u pulmonology appointment when the patient was instructed to have a seat on the exam table. Pt slipped on paper covering exam table and fell off of table landing on his right side. Pt states that he was then taken to the ER. Pt endorses 10/10 pain along the superior/anterior aspect of his right LE with movement however denies pain with rest. Pt also expressed concerns about constant hiccups that began last night. Pt endorsed urinary retention that began following a spinal fusion that was performed in 2017 and confirms that he straight cath's himself 3-4 times a day; patient states that last cath was last night. Pt's wife expressed concerns regarding surgery not being advised and was counseled that the orthopedic surgery team would meet with them for a further consult. Pt requested that pain be managed with Tylenol only. Pt's wife clarified that patient has not been taking apixaban for a few months and that he was prescribed it for paroxysmal afib and not for hx of PE's. Pt's wife endorsed metoprolol is also for paroxsymal afib and that pt does not have HTN.    VITAL SIGNS:  Vital Signs Last 24 Hrs  T(C): 36.7 (28 Mar 2023 02:04), Max: 36.7 (28 Mar 2023 02:04)  T(F): 98 (28 Mar 2023 02:04), Max: 98 (28 Mar 2023 02:04)  HR: 67 (28 Mar 2023 05:48) (56 - 67)  BP: 161/72 (28 Mar 2023 05:48) (137/67 - 168/95)  BP(mean): --  RR: 16 (28 Mar 2023 05:48) (16 - 16)  SpO2: 95% (28 Mar 2023 05:48) (95% - 95%)    Parameters below as of 28 Mar 2023 05:48  Patient On (Oxygen Delivery Method): room air      I&O's Summary      PHYSICAL EXAM:    General: WDWN  HEENT: NC/AT; PERRL, anicteric sclera; MMM  Neck: supple  Cardiovascular: +S1/S2; RRR  Respiratory: CTA B/L; no W/R/R  Gastrointestinal: soft, NT/ND; +BSx4  Extremities: WWP; no edema, clubbing or cyanosis. No ecchymosis or obvious swelling noted.   Vascular: 2+ radial, DP/PT pulses B/L  Neurological: AAOx3; no focal deficits    MEDICATIONS:  MEDICATIONS  (STANDING):  famotidine    Tablet 20 milliGRAM(s) Oral two times a day  finasteride 5 milliGRAM(s) Oral daily  influenza  Vaccine (HIGH DOSE) 0.7 milliLiter(s) IntraMuscular once  metoprolol tartrate 12.5 milliGRAM(s) Oral every 12 hours  polyethylene glycol 3350 17 Gram(s) Oral daily  senna 2 Tablet(s) Oral at bedtime    MEDICATIONS  (PRN):  acetaminophen     Tablet .. 650 milliGRAM(s) Oral every 6 hours PRN Temp greater or equal to 38C (100.4F), Mild Pain (1 - 3)      ALLERGIES:  Allergies    penicillin (Rash)    Intolerances        LABS:                        12.4   11.77 )-----------( 229      ( 28 Mar 2023 04:43 )             38.6     03-28    136  |  102  |  28<H>  ----------------------------<  126<H>  4.0   |  25  |  0.78    Ca    8.8      28 Mar 2023 04:43  Phos  4.0     03-28  Mg     2.1     03-28    TPro  6.8  /  Alb  3.3  /  TBili  0.5  /  DBili  x   /  AST  25  /  ALT  26  /  AlkPhos  72  03-28    PT/INR - ( 27 Mar 2023 19:20 )   PT: 12.6 sec;   INR: 1.06          PTT - ( 27 Mar 2023 19:20 )  PTT:33.2 sec    CAPILLARY BLOOD GLUCOSE          RADIOLOGY & ADDITIONAL TESTS: Reviewed.   OVERNIGHT EVENTS: No acute events overnight.     SUBJECTIVE / INTERVAL HPI: Patient seen and examined at bedside. Patient sitting comfortably in semi fowlers position. Pt's wife states that he was at Dr. Villatoro's office yesterday for a f/u pulmonology appointment when the patient was instructed to have a seat on the exam table. Pt slipped on paper covering exam table and fell off of table landing on his right side. Pt states that he was then taken to the ER. Pt endorses 10/10 pain along the superior/anterior aspect of his right LE with movement however denies pain with rest. Pt also expressed concerns about constant hiccups that began last night. Pt endorsed urinary retention that began following a spinal fusion that was performed in 2017 and confirms that he straight cath's himself 3-4 times a day; patient states that last cath was last night. Pt's wife expressed concerns regarding surgery not being advised and was counseled that the orthopedic surgery team would meet with them for a further consult. Pt requested that pain be managed with Tylenol only. Pt's wife clarified that patient has not been taking apixaban for a few months and that he was prescribed it for paroxysmal afib and not for hx of PE's. Pt's wife endorsed metoprolol is also for paroxsymal afib and that pt does not have HTN.    VITAL SIGNS:  Vital Signs Last 24 Hrs  T(C): 36.7 (28 Mar 2023 02:04), Max: 36.7 (28 Mar 2023 02:04)  T(F): 98 (28 Mar 2023 02:04), Max: 98 (28 Mar 2023 02:04)  HR: 67 (28 Mar 2023 05:48) (56 - 67)  BP: 161/72 (28 Mar 2023 05:48) (137/67 - 168/95)  BP(mean): --  RR: 16 (28 Mar 2023 05:48) (16 - 16)  SpO2: 95% (28 Mar 2023 05:48) (95% - 95%)    Parameters below as of 28 Mar 2023 05:48  Patient On (Oxygen Delivery Method): room air      I&O's Summary      PHYSICAL EXAM:    General: NAD  HEENT: NC/AT; PERRL, anicteric sclera; MMM  Neck: supple  Cardiovascular: +S1/S2; RRR  Respiratory: CTA B/L; no W/R/R  Gastrointestinal: soft, NT/ND; +BSx4  Extremities: WWP; no edema, clubbing or cyanosis. No ecchymosis or obvious swelling noted.   Vascular: 2+ radial, DP/PT pulses B/L  Neurological: AAOx3; no focal deficits    MEDICATIONS:  MEDICATIONS  (STANDING):  famotidine    Tablet 20 milliGRAM(s) Oral two times a day  finasteride 5 milliGRAM(s) Oral daily  influenza  Vaccine (HIGH DOSE) 0.7 milliLiter(s) IntraMuscular once  metoprolol tartrate 12.5 milliGRAM(s) Oral every 12 hours  polyethylene glycol 3350 17 Gram(s) Oral daily  senna 2 Tablet(s) Oral at bedtime    MEDICATIONS  (PRN):  acetaminophen     Tablet .. 650 milliGRAM(s) Oral every 6 hours PRN Temp greater or equal to 38C (100.4F), Mild Pain (1 - 3)      ALLERGIES:  Allergies    penicillin (Rash)    Intolerances        LABS:                        12.4   11.77 )-----------( 229      ( 28 Mar 2023 04:43 )             38.6     03-28    136  |  102  |  28<H>  ----------------------------<  126<H>  4.0   |  25  |  0.78    Ca    8.8      28 Mar 2023 04:43  Phos  4.0     03-28  Mg     2.1     03-28    TPro  6.8  /  Alb  3.3  /  TBili  0.5  /  DBili  x   /  AST  25  /  ALT  26  /  AlkPhos  72  03-28    PT/INR - ( 27 Mar 2023 19:20 )   PT: 12.6 sec;   INR: 1.06          PTT - ( 27 Mar 2023 19:20 )  PTT:33.2 sec    CAPILLARY BLOOD GLUCOSE          RADIOLOGY & ADDITIONAL TESTS: Reviewed.

## 2023-03-28 NOTE — ED PROVIDER NOTE - CLINICAL SUMMARY MEDICAL DECISION MAKING FREE TEXT BOX
hx obtained from pt, wife, ortho family friend and chart review. avss. nontoxic. NAD. no systemic sx. found to have R acetabular fx on xray/ct. preop labs/ekg/cxr done. ct head/c spine w/o acute abnl. ortho consulted. see progress notes (scanned). reccs nonop at this time. requesting admission to medicine. will admit.    PLEASE NOTE: wife of pt completely inappropriate. despite multiple updates and explanations, wife insisted on following myself around ED literally into other pt rooms interrupting evaluations/exams to ask the same questions repeatedly. wife even followed myself into resus room while taking care of an actively decompensating pt. wife had to be instructed (again) to remain out of other pt's rooms. throughout entire ED stay and even after admission and talking w/ admitting doctor, wife continued to harass/berate myself, primary nurse and charge nurse.

## 2023-03-28 NOTE — H&P ADULT - NSHPPHYSICALEXAM_GEN_ALL_CORE
VITALS: T(C): 36.3 (03-27-23 @ 18:56), Max: 36.3 (03-27-23 @ 18:56)  T(F): 97.4 (03-27-23 @ 18:56), Max: 97.4 (03-27-23 @ 18:56)  HR: 56 (03-27-23 @ 18:56) (56 - 56)  BP: 137/67 (03-27-23 @ 18:56) (137/67 - 137/67)  ABP: --  ABP(mean): --  RR: 16 (03-27-23 @ 18:56) (16 - 16)  SpO2: 95% (03-27-23 @ 18:56) (95% - 95%)    GENERAL: NAD, lying in bed comfortably  HEAD:  Atraumatic, Normocephalic  EYES: EOMI, PERRLA, conjunctiva and sclera clear  ENT: Moist mucous membranes  NECK: Supple, No JVD  CHEST/LUNG: Clear to auscultation bilaterally; No rales, rhonchi, wheezing, or rubs. Unlabored respirations  HEART: Regular rate and rhythm; No murmurs, rubs, or gallops  ABDOMEN: Bowel sounds present; Soft, Nontender, Nondistended. No hepatomegally  EXTREMITIES:  2+ Peripheral Pulses, brisk capillary refill. No clubbing, cyanosis, or edema  NERVOUS SYSTEM:  Alert & Oriented X3, speech clear. No deficits   MSK: FROM all 4 extremities, full and equal strength  SKIN: No rashes or lesions VITALS: T(C): 36.3 (03-27-23 @ 18:56), Max: 36.3 (03-27-23 @ 18:56)  T(F): 97.4 (03-27-23 @ 18:56), Max: 97.4 (03-27-23 @ 18:56)  HR: 56 (03-27-23 @ 18:56) (56 - 56)  BP: 137/67 (03-27-23 @ 18:56) (137/67 - 137/67)  ABP: --  ABP(mean): --  RR: 16 (03-27-23 @ 18:56) (16 - 16)  SpO2: 95% (03-27-23 @ 18:56) (95% - 95%)    GENERAL: NAD, lying in bed comfortably  HEAD:  Atraumatic, Normocephalic  EYES: EOMI, PERRLA, conjunctiva and sclera clear  ENT: Moist mucous membranes  NECK: Supple, No JVD  CHEST/LUNG: pectus excavatum, CTA B/L, No rales, rhonchi, wheezing, or rubs. Unlabored respirations  HEART: +S1/S2, Regular rate and rhythm; No murmurs, rubs, or gallops  ABDOMEN: PEG in place, surrounding skin, clean and dry. Soft, Nontender, Nondistended. No hepatomegally  EXTREMITIES:  2+ Peripheral Pulses, brisk capillary refill. No clubbing, cyanosis, or edema  NERVOUS SYSTEM:  Alert & Oriented X3, speech clear. No deficits   MSK: RLE ROM limited 2/2 pain, no ecchymosis or visible hematoma  SKIN: No rashes or lesions

## 2023-03-29 ENCOUNTER — TRANSCRIPTION ENCOUNTER (OUTPATIENT)
Age: 88
End: 2023-03-29

## 2023-03-29 LAB
ALBUMIN SERPL ELPH-MCNC: 3.2 G/DL — LOW (ref 3.3–5)
ALP SERPL-CCNC: 79 U/L — SIGNIFICANT CHANGE UP (ref 40–120)
ALT FLD-CCNC: 23 U/L — SIGNIFICANT CHANGE UP (ref 10–45)
ANION GAP SERPL CALC-SCNC: 13 MMOL/L — SIGNIFICANT CHANGE UP (ref 5–17)
APPEARANCE UR: ABNORMAL
AST SERPL-CCNC: 23 U/L — SIGNIFICANT CHANGE UP (ref 10–40)
BACTERIA # UR AUTO: PRESENT /HPF
BASOPHILS # BLD AUTO: 0.05 K/UL — SIGNIFICANT CHANGE UP (ref 0–0.2)
BASOPHILS NFR BLD AUTO: 0.4 % — SIGNIFICANT CHANGE UP (ref 0–2)
BILIRUB SERPL-MCNC: 0.5 MG/DL — SIGNIFICANT CHANGE UP (ref 0.2–1.2)
BILIRUB UR-MCNC: NEGATIVE — SIGNIFICANT CHANGE UP
BUN SERPL-MCNC: 23 MG/DL — SIGNIFICANT CHANGE UP (ref 7–23)
CALCIUM SERPL-MCNC: 9 MG/DL — SIGNIFICANT CHANGE UP (ref 8.4–10.5)
CALCIUM SERPL-MCNC: 9.1 MG/DL — SIGNIFICANT CHANGE UP (ref 8.4–10.5)
CHLORIDE SERPL-SCNC: 102 MMOL/L — SIGNIFICANT CHANGE UP (ref 96–108)
CO2 SERPL-SCNC: 24 MMOL/L — SIGNIFICANT CHANGE UP (ref 22–31)
COLOR SPEC: YELLOW — SIGNIFICANT CHANGE UP
CREAT SERPL-MCNC: 0.79 MG/DL — SIGNIFICANT CHANGE UP (ref 0.5–1.3)
DIFF PNL FLD: ABNORMAL
EGFR: 84 ML/MIN/1.73M2 — SIGNIFICANT CHANGE UP
EOSINOPHIL # BLD AUTO: 0.07 K/UL — SIGNIFICANT CHANGE UP (ref 0–0.5)
EOSINOPHIL NFR BLD AUTO: 0.5 % — SIGNIFICANT CHANGE UP (ref 0–6)
EPI CELLS # UR: SIGNIFICANT CHANGE UP /HPF (ref 0–5)
GLUCOSE SERPL-MCNC: 113 MG/DL — HIGH (ref 70–99)
GLUCOSE UR QL: NEGATIVE — SIGNIFICANT CHANGE UP
HCT VFR BLD CALC: 41.4 % — SIGNIFICANT CHANGE UP (ref 39–50)
HGB BLD-MCNC: 13 G/DL — SIGNIFICANT CHANGE UP (ref 13–17)
IMM GRANULOCYTES NFR BLD AUTO: 0.4 % — SIGNIFICANT CHANGE UP (ref 0–0.9)
KETONES UR-MCNC: NEGATIVE — SIGNIFICANT CHANGE UP
LEUKOCYTE ESTERASE UR-ACNC: ABNORMAL
LYMPHOCYTES # BLD AUTO: 1.23 K/UL — SIGNIFICANT CHANGE UP (ref 1–3.3)
LYMPHOCYTES # BLD AUTO: 8.9 % — LOW (ref 13–44)
MAGNESIUM SERPL-MCNC: 2 MG/DL — SIGNIFICANT CHANGE UP (ref 1.6–2.6)
MCHC RBC-ENTMCNC: 27.4 PG — SIGNIFICANT CHANGE UP (ref 27–34)
MCHC RBC-ENTMCNC: 31.4 GM/DL — LOW (ref 32–36)
MCV RBC AUTO: 87.2 FL — SIGNIFICANT CHANGE UP (ref 80–100)
MONOCYTES # BLD AUTO: 1.21 K/UL — HIGH (ref 0–0.9)
MONOCYTES NFR BLD AUTO: 8.8 % — SIGNIFICANT CHANGE UP (ref 2–14)
NEUTROPHILS # BLD AUTO: 11.15 K/UL — HIGH (ref 1.8–7.4)
NEUTROPHILS NFR BLD AUTO: 81 % — HIGH (ref 43–77)
NITRITE UR-MCNC: NEGATIVE — SIGNIFICANT CHANGE UP
NRBC # BLD: 0 /100 WBCS — SIGNIFICANT CHANGE UP (ref 0–0)
PH UR: 6 — SIGNIFICANT CHANGE UP (ref 5–8)
PHOSPHATE SERPL-MCNC: 2.5 MG/DL — SIGNIFICANT CHANGE UP (ref 2.5–4.5)
PLATELET # BLD AUTO: 201 K/UL — SIGNIFICANT CHANGE UP (ref 150–400)
POTASSIUM SERPL-MCNC: 3.9 MMOL/L — SIGNIFICANT CHANGE UP (ref 3.5–5.3)
POTASSIUM SERPL-SCNC: 3.9 MMOL/L — SIGNIFICANT CHANGE UP (ref 3.5–5.3)
PROT SERPL-MCNC: 6.9 G/DL — SIGNIFICANT CHANGE UP (ref 6–8.3)
PROT UR-MCNC: 30 MG/DL
PTH-INTACT FLD-MCNC: 50 PG/ML — SIGNIFICANT CHANGE UP (ref 15–65)
RBC # BLD: 4.75 M/UL — SIGNIFICANT CHANGE UP (ref 4.2–5.8)
RBC # FLD: 14.6 % — HIGH (ref 10.3–14.5)
RBC CASTS # UR COMP ASSIST: < 5 /HPF — SIGNIFICANT CHANGE UP
SODIUM SERPL-SCNC: 139 MMOL/L — SIGNIFICANT CHANGE UP (ref 135–145)
SP GR SPEC: 1.02 — SIGNIFICANT CHANGE UP (ref 1–1.03)
T4 AB SER-ACNC: 6.03 UG/DL — SIGNIFICANT CHANGE UP (ref 4.5–11.7)
T4 AB SER-ACNC: 6.09 UG/DL — SIGNIFICANT CHANGE UP (ref 4.5–11.7)
TSH SERPL-MCNC: 7.49 UIU/ML — HIGH (ref 0.27–4.2)
TSH SERPL-MCNC: 7.55 UIU/ML — HIGH (ref 0.27–4.2)
UROBILINOGEN FLD QL: 0.2 E.U./DL — SIGNIFICANT CHANGE UP
VIT D25+D1,25 OH+D1,25 PNL SERPL-MCNC: 52.6 PG/ML — SIGNIFICANT CHANGE UP (ref 19.9–79.3)
WBC # BLD: 13.76 K/UL — HIGH (ref 3.8–10.5)
WBC # FLD AUTO: 13.76 K/UL — HIGH (ref 3.8–10.5)
WBC UR QL: ABNORMAL /HPF

## 2023-03-29 PROCEDURE — 99232 SBSQ HOSP IP/OBS MODERATE 35: CPT

## 2023-03-29 PROCEDURE — 72192 CT PELVIS W/O DYE: CPT | Mod: 26

## 2023-03-29 RX ADMIN — Medication 12.5 MILLIGRAM(S): at 14:30

## 2023-03-29 RX ADMIN — HEPARIN SODIUM 5000 UNIT(S): 5000 INJECTION INTRAVENOUS; SUBCUTANEOUS at 23:39

## 2023-03-29 RX ADMIN — Medication 12.5 MILLIGRAM(S): at 05:47

## 2023-03-29 RX ADMIN — HEPARIN SODIUM 5000 UNIT(S): 5000 INJECTION INTRAVENOUS; SUBCUTANEOUS at 05:47

## 2023-03-29 RX ADMIN — HEPARIN SODIUM 5000 UNIT(S): 5000 INJECTION INTRAVENOUS; SUBCUTANEOUS at 14:30

## 2023-03-29 RX ADMIN — PANTOPRAZOLE SODIUM 40 MILLIGRAM(S): 20 TABLET, DELAYED RELEASE ORAL at 05:47

## 2023-03-29 RX ADMIN — Medication 75 MICROGRAM(S): at 23:38

## 2023-03-29 RX ADMIN — FINASTERIDE 5 MILLIGRAM(S): 5 TABLET, FILM COATED ORAL at 14:30

## 2023-03-29 RX ADMIN — FAMOTIDINE 20 MILLIGRAM(S): 10 INJECTION INTRAVENOUS at 05:47

## 2023-03-29 RX ADMIN — SODIUM CHLORIDE 4 MILLILITER(S): 9 INJECTION INTRAMUSCULAR; INTRAVENOUS; SUBCUTANEOUS at 05:48

## 2023-03-29 RX ADMIN — FAMOTIDINE 20 MILLIGRAM(S): 10 INJECTION INTRAVENOUS at 17:08

## 2023-03-29 RX ADMIN — SENNA PLUS 1 TABLET(S): 8.6 TABLET ORAL at 23:38

## 2023-03-29 NOTE — DISCHARGE NOTE PROVIDER - CARE PROVIDER_API CALL
Yong Cifuentes)  Orthopaedic Surgery  [address] [facility], [address]  [place] [address]  Phone: [phone]  Fax: [phone]  Follow Up Time:    Yong Cifuentes  [address], [place]  Phone: [phone]  Fax: (   )    -  Scheduled Appointment: 04/12/2023 09:30 AM

## 2023-03-29 NOTE — DISCHARGE NOTE PROVIDER - NSDCMRMEDTOKEN_GEN_ALL_CORE_FT
finasteride 5 mg oral tablet: 1 tab(s) orally once a day  metoprolol tartrate 25 mg oral tablet: 0.5 tablet orally once a day  Synthroid 75 mcg (0.075 mg) oral tablet: 1 orally once a day   famotidine 10 mg oral tablet: 1 tab(s) orally 2 times a day  finasteride 5 mg oral tablet: 1 tab(s) orally once a day  metoprolol tartrate 25 mg oral tablet: 0.5 tablet orally once a day  pantoprazole 40 mg oral delayed release tablet: 1 tab(s) orally once a day (before a meal)  Synthroid 75 mcg (0.075 mg) oral tablet: 1 orally once a day

## 2023-03-29 NOTE — SWALLOW BEDSIDE ASSESSMENT ADULT - SLP PERTINENT HISTORY OF CURRENT PROBLEM
h/o multiple falls resulting in rib fx & pneumothorax requiring intubation with resulting esophageal injury; Pt s/p PEG which is no longer in use

## 2023-03-29 NOTE — DISCHARGE NOTE PROVIDER - NSDCCPCAREPLAN_GEN_ALL_CORE_FT
PRINCIPAL DISCHARGE DIAGNOSIS  Diagnosis: Acetabular fracture  Assessment and Plan of Treatment: You have had a fall today. It appears that the cause is “mechanical”. That means that you slipped, tripped or lost your balance. If your fall had been due to fainting or a seizure, further tests would be required. Health conditions which change your blood pressure, vision, muscle strength and coordination may increase your risk for falls. Medication can also increase your risk if they make you dizzy, weak, or sleepy. To prevent falls, you can stand or sit up slowly, use assistive devices as directed, pwear shoes that fit well and have soles that , wear a personal alarm, stay active, and manage your medical conditions. Home safety tips include keeping your path clear, removing small rugs, not walking on wet surfaces, installing bright lights at home, and keeping items you use often on shelves within reach.       PRINCIPAL DISCHARGE DIAGNOSIS  Diagnosis: Acetabular fracture  Assessment and Plan of Treatment: You have had a fall today. It appears that the cause is “mechanical”. That means that you slipped, tripped or lost your balance. If your fall had been due to fainting or a seizure, further tests would be required. Health conditions which change your blood pressure, vision, muscle strength and coordination may increase your risk for falls. Medication can also increase your risk if they make you dizzy, weak, or sleepy. To prevent falls, you can stand or sit up slowly, use assistive devices as directed, pwear shoes that fit well and have soles that , wear a personal alarm, stay active, and manage your medical conditions. Home safety tips include keeping your path clear, removing small rugs, not walking on wet surfaces, installing bright lights at home, and keeping items you use often on shelves within reach.  You were found to have a fracture of the right hip after a mechanical fall. You were evaluated by the orthopedic team and they determined you did not require surgery Physical therapy saw you and determined that you need to be in a rehabilitation facility to ensure that you make a successful recovery. Please call 911 or seek immediate care if you have chest pain, shortness of breath, fever, stiffness or inability to move the hip, increased swelling in your leg, or increased hip pain.  Please follow up with Dr. Cifuentes _____     PRINCIPAL DISCHARGE DIAGNOSIS  Diagnosis: Acetabular fracture  Assessment and Plan of Treatment: You have had a fall today. It appears that the cause is “mechanical”. That means that you slipped, tripped or lost your balance. If your fall had been due to fainting or a seizure, further tests would be required. Health conditions which change your blood pressure, vision, muscle strength and coordination may increase your risk for falls. Medication can also increase your risk if they make you dizzy, weak, or sleepy. To prevent falls, you can stand or sit up slowly, use assistive devices as directed, pwear shoes that fit well and have soles that , wear a personal alarm, stay active, and manage your medical conditions. Home safety tips include keeping your path clear, removing small rugs, not walking on wet surfaces, installing bright lights at home, and keeping items you use often on shelves within reach.  You were found to have a fracture of the right hip after a mechanical fall. You were evaluated by the orthopedic team and they determined you did not require surgery Physical therapy saw you and determined that you need to be in a rehabilitation facility to ensure that you make a successful recovery. Please call 911 or seek immediate care if you have chest pain, shortness of breath, fever, stiffness or inability to move the hip, increased swelling in your leg, or increased hip pain.  Please follow up with Dr. Cifuentes on 4/12.

## 2023-03-29 NOTE — PROGRESS NOTE ADULT - PROBLEM SELECTOR PLAN 1
#Sacrum Fracture  #Pubic Rami Fracture  S/P mechanical fall of examining table. CT Pelvis:  Acute comminuted fractures of the anterior and superior walls of the right acetabulum. Acute nondisplaced right sacral body fracture. Acute right superior and inferior pubic rami fractures. Moderate to large degree of right-sided pelvic sidewall hemorrhage extending along the right lateral aspect of the urinary bladder. Right obturator internus intramuscular hematoma.    Plan:  - nonoperative management per Ortho  - Toe touch weight bearing r side  - pain mgmt: Tylenol 650mg PO q6hrs  - PT recs BRISA  - Ortho following, appreciate recs

## 2023-03-29 NOTE — PROGRESS NOTE ADULT - PROBLEM SELECTOR PLAN 5
Hx of paroxysmal atrial fibrillation, no longer on eliquis. Takes metoprolol 12.5mg once a day for rate control. NSR.  - c/w home medication  - Unclear actual home dose as wife has contacted providers several times changing BP dose.  - c/w 12.5 TID dosing at this time

## 2023-03-29 NOTE — PROGRESS NOTE ADULT - ATTENDING COMMENTS
89M with PMH of HTN, hypothyroidism, BPH and SBO presenting after a fall at doctor's office, suffering R acetabular, R sacral and R pubic rami fractures.  Pelvic hematoma and intramuscular hematoma seen on imaging as well.     #Multiple fractures  #Pelvic hematoma  - Hemoglobin stable even after starting prophylactic heparin  - repeat CT today to follow up on stability of hematoma  - patient will be going to [facility] pending stability of scan  - PT consult  - appreciate input from orthopedics    #Atrial fibrillation  - no longer on anticoagulation  - continue metoprolol    Moderate level of decision making required - presence of 2 chronic medical conditions and acetabular fracture with no further workup.

## 2023-03-29 NOTE — DISCHARGE NOTE PROVIDER - ATTENDING DISCHARGE PHYSICAL EXAMINATION:
General: no acute distress, lying comfortably in bed  HEENT: normocephalic, atraumatic, MMM  Cards: RRR, normal S1/S2, no murmurs, rubs or gallops  Pulm: CTAB, no wheeze, crackles, rales or rhonchi  Ab: PEG in place, nondistended, nontender  Ext: in SCDs, no edema appreciated  Skin: no visible hematoma or tenderness  Neuro: grossly nonfocal exam, follows commands, no facial asymmetry    #R acetabular fracture  #Pelvic wall hematoma  - repeat imaging with resolving hematoma, CBC stable.  No hemodynamic instability.  Labs can be repeated in 3-5 days, or if any significant clinical indication while at acute rehab.   - being discharged to acute rehab.  Patient was not a surgical candidate, per orthopedics.  Will need to follow up with orthopedic team.     #Constipation  - resolved.  Had multiple bowel movements with bowel regimen initiation.

## 2023-03-29 NOTE — DISCHARGE NOTE PROVIDER - HOSPITAL COURSE
#Discharge: do not delete    Patient is __ yo M/F with past medical history of _____ presented with _____, found to have _____ (one liner)    Hospital course (by problem):     Patient was discharged to: (home/BRISA/acute rehab/hospice, etc, and with what services – home health PT/RN? Home O2?)    New medications:   Changes to old medications:  Medications that were stopped:    Items to follow up as outpatient:    Physical exam at the time of discharge:       #Discharge: do not delete    89 y/o M pt with PM-Hx of HTN, hypothyroid, esophageal spasm, BPH, urinary retention requiring self-cath, SBO s/p partial resection, presents following fall, found to have R side acetabular fracture, R sacral fracture and R rami fractures, non-surgical per ortho. Admitted to [facility] for medical management    #Right acetabular fracture.   #Sacrum Fracture  #Pubic Rami Fracture  S/P mechanical fall of examining table. CT Pelvis:  Acute comminuted fractures of the anterior and superior walls of the right acetabulum. Acute nondisplaced right sacral body fracture. Acute right superior and inferior pubic rami fractures. Moderate to large degree of right-sided pelvic sidewall hemorrhage extending along the right lateral aspect of the urinary bladder. Right obturator internus intramuscular hematoma.    - nonoperative management per Ortho  - Toe touch weight bearing r side  - pain mgmt: Tylenol 650mg PO q6hrs  - PT evaluated and rec BRISA  - F/u with Dr. Cifuentes outpatient     #Intramuscular hematoma  #R pelvis hemorrhage    CT Pelvis showing Moderate to large degree of right-sided pelvic sidewall hemorrhage extending along the right lateral aspect of the urinary bladder. Right obturator internus intramuscular hematoma.    - repeat CT Pelvis for shows interval improvement of hematoma and hemorrhage   - hematoma not appreciated on physical exam  - Hg stable at time of discharge      #Constipation  CT Pelvis c/w dilated rectum containing large volume stool.     - Large volume BM s/p suppository 3/31    Patient was discharged to:     New medications: none  Changes to old medications: none  Medications that were stopped: none    Items to follow up as outpatient: F/u with Dr. Cifuentes (ortho)    Physical exam at the time of discharge:  GENERAL: NAD, lying in bed comfortably  HEAD:  Atraumatic, Normocephalic  EYES: EOMI, PERRLA, conjunctiva and sclera clear  ENT: Moist mucous membranes  NECK: Supple, No JVD  CHEST/LUNG: pectus excavatum, CTA B/L, No rales, rhonchi, wheezing, or rubs. Unlabored respirations  HEART: +S1/S2, Regular rate and rhythm; No murmurs, rubs, or gallops  ABDOMEN: PEG in place, surrounding skin, clean and dry. Soft, Nontender, Mild distension. No hepatomegally  EXTREMITIES:  2+ Peripheral Pulses, brisk capillary refill. No clubbing, cyanosis, or edema  NERVOUS SYSTEM:  Alert & Oriented X3, speech clear. No deficits   MSK: RLE ROM limited, no ecchymosis or visible hematoma  SKIN: No rashes or lesions     #Discharge: do not delete    89 y/o M pt with PM-Hx of HTN, hypothyroid, esophageal spasm, BPH, urinary retention requiring self-cath, SBO s/p partial resection, presents following fall, found to have R side acetabular fracture, R sacral fracture and R rami fractures, non-surgical per ortho. Admitted to [facility] for medical management    #Right acetabular fracture.   #Sacrum Fracture  #Pubic Rami Fracture  S/P mechanical fall of examining table. CT Pelvis:  Acute comminuted fractures of the anterior and superior walls of the right acetabulum. Acute nondisplaced right sacral body fracture. Acute right superior and inferior pubic rami fractures. Moderate to large degree of right-sided pelvic sidewall hemorrhage extending along the right lateral aspect of the urinary bladder. Right obturator internus intramuscular hematoma.    - nonoperative management per Ortho  - Toe touch weight bearing r side  - pain mgmt: Tylenol 650mg PO q6hrs  - PT evaluated and rec acute Rehab  - F/u with Dr. Cifuentes outpatient     #Intramuscular hematoma  #R pelvis hemorrhage    CT Pelvis showing Moderate to large degree of right-sided pelvic sidewall hemorrhage extending along the right lateral aspect of the urinary bladder. Right obturator internus intramuscular hematoma.    - repeat CT Pelvis for shows interval improvement of hematoma and hemorrhage   - hematoma not appreciated on physical exam  - Hg stable at time of discharge      #Constipation  CT Pelvis c/w dilated rectum containing large volume stool.     - Large volume BM s/p suppository 3/31    Patient was discharged to: Acute Rehab    New medications: none  Changes to old medications: none  Medications that were stopped: none    Items to follow up as outpatient: F/u with Dr. Cifuentes (ortho)    Physical exam at the time of discharge:  GENERAL: NAD, lying in bed comfortably  HEAD:  Atraumatic, Normocephalic  EYES: EOMI, PERRLA, conjunctiva and sclera clear  ENT: Moist mucous membranes  NECK: Supple, No JVD  CHEST/LUNG: pectus excavatum, CTA B/L, No rales, rhonchi, wheezing, or rubs. Unlabored respirations  HEART: +S1/S2, Regular rate and rhythm; No murmurs, rubs, or gallops  ABDOMEN: PEG in place, surrounding skin, clean and dry. Soft, Nontender, Mild distension. No hepatomegally  EXTREMITIES:  2+ Peripheral Pulses, brisk capillary refill. No clubbing, cyanosis, or edema  NERVOUS SYSTEM:  Alert & Oriented X3, speech clear. No deficits   MSK: RLE ROM limited, no ecchymosis or visible hematoma  SKIN: No rashes or lesions

## 2023-03-29 NOTE — PROGRESS NOTE ADULT - SUBJECTIVE AND OBJECTIVE BOX
OVERNIGHT EVENTS: BELEN    SUBJECTIVE / INTERVAL HPI: Patient seen and examined at bedside. Stating he had a "busy night, was out in the woods working on the trees." Redirectable to situation and place. Aox3 able to state why he is in the hospital. Otherwise slept well, denies pain at this time. ROS otherwise negative.     VITAL SIGNS:  Vital Signs Last 24 Hrs  T(C): 36.8 (29 Mar 2023 09:00), Max: 36.9 (28 Mar 2023 20:45)  T(F): 98.2 (29 Mar 2023 09:00), Max: 98.4 (28 Mar 2023 20:45)  HR: 78 (29 Mar 2023 09:00) (72 - 100)  BP: 154/80 (29 Mar 2023 09:00) (143/67 - 160/80)  BP(mean): 105 (29 Mar 2023 09:00) (105 - 105)  RR: 16 (29 Mar 2023 09:) (16 - 17)  SpO2: 95% (29 Mar 2023 09:00) (91% - 96%)    Parameters below as of 29 Mar 2023 09:00  Patient On (Oxygen Delivery Method): room air      I&O's Summary    28 Mar 2023 07:01  -  29 Mar 2023 07:00  --------------------------------------------------------  IN: 600 mL / OUT: 300 mL / NET: 300 mL        PHYSICAL EXAM:    GENERAL: NAD, lying in bed comfortably  HEAD:  Atraumatic, Normocephalic  EYES: EOMI, PERRLA, conjunctiva and sclera clear  ENT: Moist mucous membranes  NECK: Supple, No JVD  CHEST/LUNG: pectus excavatum, CTA B/L, No rales, rhonchi, wheezing, or rubs. Unlabored respirations  HEART: +S1/S2, Regular rate and rhythm; No murmurs, rubs, or gallops  ABDOMEN: PEG in place, surrounding skin, clean and dry. Soft, Nontender, Nondistended. No hepatomegally  EXTREMITIES:  2+ Peripheral Pulses, brisk capillary refill. No clubbing, cyanosis, or edema  NERVOUS SYSTEM:  Alert & Oriented X3, speech clear. No deficits   MSK: RLE ROM limited 2/2 pain, no ecchymosis or visible hematoma  SKIN: No rashes or lesions    MEDICATIONS:  MEDICATIONS  (STANDING):  famotidine    Tablet 20 milliGRAM(s) Oral two times a day  finasteride 5 milliGRAM(s) Oral daily  heparin   Injectable 5000 Unit(s) SubCutaneous every 8 hours  levothyroxine 75 MICROGram(s) Oral daily  metoprolol tartrate 12.5 milliGRAM(s) Oral every 8 hours  pantoprazole    Tablet 40 milliGRAM(s) Oral before breakfast  polyethylene glycol 3350 17 Gram(s) Oral daily  senna 2 Tablet(s) Oral at bedtime  sodium chloride 3%  Inhalation 4 milliLiter(s) Inhalation every 24 hours    MEDICATIONS  (PRN):  acetaminophen     Tablet .. 650 milliGRAM(s) Oral every 6 hours PRN Temp greater or equal to 38C (100.4F), Mild Pain (1 - 3)  metoclopramide 10 milliGRAM(s) Oral every 8 hours PRN hiccups      ALLERGIES:  Allergies    penicillin (Rash)    Intolerances        LABS:                        13.0   13.76 )-----------( 201      ( 29 Mar 2023 05:30 )             41.4     03-    139  |  102  |  23  ----------------------------<  113<H>  3.9   |  24  |  0.79    Ca    9.1      29 Mar 2023 05:30  Phos  2.5     03-  Mg     2.0     -    TPro  6.9  /  Alb  3.2<L>  /  TBili  0.5  /  DBili  x   /  AST  23  /  ALT  23  /  AlkPhos  79  03-29    PT/INR - ( 27 Mar 2023 19:20 )   PT: 12.6 sec;   INR: 1.06          PTT - ( 27 Mar 2023 19:20 )  PTT:33.2 sec  Urinalysis Basic - ( 29 Mar 2023 06:21 )    Color: Yellow / Appearance: SL Cloudy / S.020 / pH: x  Gluc: x / Ketone: NEGATIVE  / Bili: Negative / Urobili: 0.2 E.U./dL   Blood: x / Protein: 30 mg/dL / Nitrite: NEGATIVE   Leuk Esterase: Moderate / RBC: < 5 /HPF / WBC Many /HPF   Sq Epi: x / Non Sq Epi: 0-5 /HPF / Bacteria: Present /HPF      CAPILLARY BLOOD GLUCOSE          RADIOLOGY & ADDITIONAL TESTS: Reviewed.

## 2023-03-29 NOTE — SWALLOW BEDSIDE ASSESSMENT ADULT - NS SPL SWALLOW CLINIC TRIAL FT
Pt presents with functional natalya-pharyngeal swallow on this exam with no overt signs of airway protection deficits. Pt can resume regular diet with thin liquids. No further f/u is warranted from this service.

## 2023-03-29 NOTE — PROGRESS NOTE ADULT - SUBJECTIVE AND OBJECTIVE BOX
Ortho Note    Pt comfortable without complaints, pain controlled  Aox2 difficult to ilicit exam    Vital Signs Last 24 Hrs  T(C): 36.8 (03-29-23 @ 09:00), Max: 36.8 (03-29-23 @ 09:00)  T(F): 98.2 (03-29-23 @ 09:00), Max: 98.2 (03-29-23 @ 09:00)  HR: 78 (03-29-23 @ 09:00) (78 - 78)  BP: 154/80 (03-29-23 @ 09:00) (154/80 - 154/80)  BP(mean): 105 (03-29-23 @ 09:00) (105 - 105)  RR: 16 (03-29-23 @ 09:00) (16 - 16)  SpO2: 95% (03-29-23 @ 09:00) (95% - 95%)  I&O's Summary    28 Mar 2023 07:01  -  29 Mar 2023 07:00  --------------------------------------------------------  IN: 600 mL / OUT: 300 mL / NET: 300 mL        General: Pt Alert and oriented x2  B/L LE: sensation intact  DP 2+  brisk cap refill  EHL/FHL/TA/GS firing bilaterally R < L R 2/2 pain  point tenderness in ASIS R side  skin intact                        13.0   13.76 )-----------( 201      ( 29 Mar 2023 05:30 )             41.4     03-29    139  |  102  |  23  ----------------------------<  113<H>  3.9   |  24  |  0.79    Ca    9.1      29 Mar 2023 05:30  Phos  2.5     03-29  Mg     2.0     03-29    TPro  6.9  /  Alb  3.2<L>  /  TBili  0.5  /  DBili  x   /  AST  23  /  ALT  23  /  AlkPhos  79  03-29      A/P: 89yMale PMH HTN, hypothyroid, esophageal spasm, BPH, urinary retention requiring self-cath, SBO s/p partial resection, and malnutrition with PEG tube,   s/p fall off physician exam table presenting with R side acetabular fx (anterior column +/- posterior wall)  -frailty, med admit  -Toe touch weight bearing r side  -non-operative management  -PT eval  -DVT proph: primary team dispo  - Patient history, exam, imaging and plan as per discussion with Dr. Cifuentes    Ortho Pager [phone] Ortho Note    Pt comfortable without complaints, pain controlled  Aox2 difficult to ilicit exam    Vital Signs Last 24 Hrs  T(C): 36.8 (03-29-23 @ 09:00), Max: 36.8 (03-29-23 @ 09:00)  T(F): 98.2 (03-29-23 @ 09:00), Max: 98.2 (03-29-23 @ 09:00)  HR: 78 (03-29-23 @ 09:00) (78 - 78)  BP: 154/80 (03-29-23 @ 09:00) (154/80 - 154/80)  BP(mean): 105 (03-29-23 @ 09:00) (105 - 105)  RR: 16 (03-29-23 @ 09:00) (16 - 16)  SpO2: 95% (03-29-23 @ 09:00) (95% - 95%)  I&O's Summary    28 Mar 2023 07:01  -  29 Mar 2023 07:00  --------------------------------------------------------  IN: 600 mL / OUT: 300 mL / NET: 300 mL        General: Pt Alert and oriented x2  B/L LE: sensation intact  DP 2+  brisk cap refill  EHL/FHL/TA/GS firing bilaterally R < L R 2/2 pain  point tenderness in ASIS R side  skin intact                        13.0   13.76 )-----------( 201      ( 29 Mar 2023 05:30 )             41.4     03-29    139  |  102  |  23  ----------------------------<  113<H>  3.9   |  24  |  0.79    Ca    9.1      29 Mar 2023 05:30  Phos  2.5     03-29  Mg     2.0     03-29    TPro  6.9  /  Alb  3.2<L>  /  TBili  0.5  /  DBili  x   /  AST  23  /  ALT  23  /  AlkPhos  79  03-29      A/P: 89yMale PMH HTN, hypothyroid, esophageal spasm, BPH, urinary retention requiring self-cath, SBO s/p partial resection, and malnutrition with PEG tube,   s/p fall off physician exam table presenting with R side acetabular fx (anterior column +/- posterior hemitransverse)  -frailty, med admit  -Toe touch weight bearing r side  -non-operative management  -PT eval  -DVT proph: primary team dispo  - Patient history, exam, imaging and plan as per discussion with Dr. Cifuentes    Ortho Pager [phone]

## 2023-03-29 NOTE — PROGRESS NOTE ADULT - PROBLEM SELECTOR PLAN 2
#R pelvis hemorrhage    CT Pelvis showing Moderate to large degree of right-sided pelvic sidewall hemorrhage extending along the right lateral aspect of the urinary bladder. Right obturator internus intramuscular hematoma.    Plan:  - repeat CT Pelvis for resolution of hematoma  - hematoma not appreciated on physical exam  - Hg stable 13.1  - resumed DVT ppx heparin subq  - Maintain active T&S

## 2023-03-29 NOTE — PROGRESS NOTE ADULT - PROBLEM SELECTOR PLAN 4
Pt presenting with WBC of 18, afebrile. Otherwise, not meeting SIRS criteria. Likely 2/2 to trauma.    Plan:   - downtrending, continue to monitor

## 2023-03-29 NOTE — DISCHARGE NOTE PROVIDER - PROVIDER TOKENS
PROVIDER:[TOKEN:[[id number]]] FREE:[LAST:[Vane],FIRST:[Yong],PHONE:[phone]],FAX:[(   [phone]    -],ADDRESS:[[facility] [address] [place], [place]],SCHEDULEDAPPT:[04/12/2023],SCHEDULEDAPPTTIME:[09:30 AM]]

## 2023-03-30 LAB
ANION GAP SERPL CALC-SCNC: 11 MMOL/L — SIGNIFICANT CHANGE UP (ref 5–17)
BUN SERPL-MCNC: 24 MG/DL — HIGH (ref 7–23)
CALCIUM SERPL-MCNC: 8.2 MG/DL — LOW (ref 8.4–10.5)
CHLORIDE SERPL-SCNC: 106 MMOL/L — SIGNIFICANT CHANGE UP (ref 96–108)
CO2 SERPL-SCNC: 23 MMOL/L — SIGNIFICANT CHANGE UP (ref 22–31)
CREAT SERPL-MCNC: 0.84 MG/DL — SIGNIFICANT CHANGE UP (ref 0.5–1.3)
EGFR: 82 ML/MIN/1.73M2 — SIGNIFICANT CHANGE UP
GLUCOSE SERPL-MCNC: 110 MG/DL — HIGH (ref 70–99)
HCT VFR BLD CALC: 36.6 % — LOW (ref 39–50)
HCT VFR BLD CALC: 36.8 % — LOW (ref 39–50)
HGB BLD-MCNC: 11.3 G/DL — LOW (ref 13–17)
HGB BLD-MCNC: 11.5 G/DL — LOW (ref 13–17)
MCHC RBC-ENTMCNC: 27.4 PG — SIGNIFICANT CHANGE UP (ref 27–34)
MCHC RBC-ENTMCNC: 27.4 PG — SIGNIFICANT CHANGE UP (ref 27–34)
MCHC RBC-ENTMCNC: 30.9 GM/DL — LOW (ref 32–36)
MCHC RBC-ENTMCNC: 31.3 GM/DL — LOW (ref 32–36)
MCV RBC AUTO: 87.8 FL — SIGNIFICANT CHANGE UP (ref 80–100)
MCV RBC AUTO: 88.6 FL — SIGNIFICANT CHANGE UP (ref 80–100)
NRBC # BLD: 0 /100 WBCS — SIGNIFICANT CHANGE UP (ref 0–0)
NRBC # BLD: 0 /100 WBCS — SIGNIFICANT CHANGE UP (ref 0–0)
PLATELET # BLD AUTO: 174 K/UL — SIGNIFICANT CHANGE UP (ref 150–400)
PLATELET # BLD AUTO: 190 K/UL — SIGNIFICANT CHANGE UP (ref 150–400)
POTASSIUM SERPL-MCNC: 4.2 MMOL/L — SIGNIFICANT CHANGE UP (ref 3.5–5.3)
POTASSIUM SERPL-SCNC: 4.2 MMOL/L — SIGNIFICANT CHANGE UP (ref 3.5–5.3)
RBC # BLD: 4.13 M/UL — LOW (ref 4.2–5.8)
RBC # BLD: 4.19 M/UL — LOW (ref 4.2–5.8)
RBC # FLD: 14.9 % — HIGH (ref 10.3–14.5)
RBC # FLD: 15.1 % — HIGH (ref 10.3–14.5)
SODIUM SERPL-SCNC: 140 MMOL/L — SIGNIFICANT CHANGE UP (ref 135–145)
WBC # BLD: 11.36 K/UL — HIGH (ref 3.8–10.5)
WBC # BLD: 9.89 K/UL — SIGNIFICANT CHANGE UP (ref 3.8–10.5)
WBC # FLD AUTO: 11.36 K/UL — HIGH (ref 3.8–10.5)
WBC # FLD AUTO: 9.89 K/UL — SIGNIFICANT CHANGE UP (ref 3.8–10.5)

## 2023-03-30 PROCEDURE — 99232 SBSQ HOSP IP/OBS MODERATE 35: CPT

## 2023-03-30 RX ORDER — FAMOTIDINE 10 MG/ML
10 INJECTION INTRAVENOUS
Refills: 0 | Status: DISCONTINUED | OUTPATIENT
Start: 2023-03-30 | End: 2023-04-03

## 2023-03-30 RX ORDER — ACETAMINOPHEN 500 MG
1000 TABLET ORAL ONCE
Refills: 0 | Status: COMPLETED | OUTPATIENT
Start: 2023-03-30 | End: 2023-03-30

## 2023-03-30 RX ADMIN — HEPARIN SODIUM 5000 UNIT(S): 5000 INJECTION INTRAVENOUS; SUBCUTANEOUS at 22:09

## 2023-03-30 RX ADMIN — Medication 650 MILLIGRAM(S): at 14:21

## 2023-03-30 RX ADMIN — Medication 400 MILLIGRAM(S): at 19:13

## 2023-03-30 RX ADMIN — Medication 12.5 MILLIGRAM(S): at 13:38

## 2023-03-30 RX ADMIN — Medication 12.5 MILLIGRAM(S): at 06:56

## 2023-03-30 RX ADMIN — FINASTERIDE 5 MILLIGRAM(S): 5 TABLET, FILM COATED ORAL at 11:52

## 2023-03-30 RX ADMIN — HEPARIN SODIUM 5000 UNIT(S): 5000 INJECTION INTRAVENOUS; SUBCUTANEOUS at 13:39

## 2023-03-30 RX ADMIN — SODIUM CHLORIDE 4 MILLILITER(S): 9 INJECTION INTRAMUSCULAR; INTRAVENOUS; SUBCUTANEOUS at 06:57

## 2023-03-30 RX ADMIN — HEPARIN SODIUM 5000 UNIT(S): 5000 INJECTION INTRAVENOUS; SUBCUTANEOUS at 06:57

## 2023-03-30 RX ADMIN — PANTOPRAZOLE SODIUM 40 MILLIGRAM(S): 20 TABLET, DELAYED RELEASE ORAL at 06:56

## 2023-03-30 RX ADMIN — FAMOTIDINE 20 MILLIGRAM(S): 10 INJECTION INTRAVENOUS at 06:57

## 2023-03-30 RX ADMIN — Medication 10 MILLIGRAM(S): at 11:51

## 2023-03-30 RX ADMIN — Medication 12.5 MILLIGRAM(S): at 22:09

## 2023-03-30 RX ADMIN — Medication 650 MILLIGRAM(S): at 15:20

## 2023-03-30 RX ADMIN — FAMOTIDINE 20 MILLIGRAM(S): 10 INJECTION INTRAVENOUS at 18:28

## 2023-03-30 RX ADMIN — POLYETHYLENE GLYCOL 3350 17 GRAM(S): 17 POWDER, FOR SOLUTION ORAL at 11:52

## 2023-03-30 NOTE — PROGRESS NOTE ADULT - PROBLEM SELECTOR PLAN 2
#R pelvis hemorrhage    CT Pelvis showing Moderate to large degree of right-sided pelvic sidewall hemorrhage extending along the right lateral aspect of the urinary bladder. Right obturator internus intramuscular hematoma.    Plan:  - repeat CT Pelvis for shows interval improvement of hematoma and hemorrhage   - hematoma not appreciated on physical exam  - Hg downtrended to 11 however likely dilutional, repeat CBC in PM  - c/w DVT ppx heparin subq  - Maintain active T&S

## 2023-03-30 NOTE — PROGRESS NOTE ADULT - PROBLEM SELECTOR PLAN 4
-- resolved. Pt presenting with WBC of 18, afebrile. Otherwise, not meeting SIRS criteria. Likely 2/2 to trauma.    Plan:   - downtrending, continue to monitor

## 2023-03-30 NOTE — PROGRESS NOTE ADULT - ATTENDING COMMENTS
89M retired [employer] with PMH of HTN, hypothyroidism, BPH and SBO presenting after a fall at doctor's office, suffering R acetabular, R sacral and R pubic rami fractures.  Pelvic hematoma and intramuscular hematoma seen on imaging as well.     #Multiple fractures  #Pelvic hematoma  - Hemoglobin stable even after starting prophylactic heparin  - repeat CT with resolving hematomas  - dispo pending - wife is refusing BRISA.  Requesting re-eval by physical therapy.  Wants her  to go to acute rehab.   - PT consult  - appreciate input from orthopedics - continue with nonoperative management  - slight drop in hemoglobin, without any other instability.  Likely delayed reduction in counts following event  - repeat tomorrow    #Atrial fibrillation  - no longer on anticoagulation  - continue metoprolol    Moderate level of decision making required - presence of 2 chronic medical conditions and acetabular fracture with no further workup.

## 2023-03-30 NOTE — PROGRESS NOTE ADULT - SUBJECTIVE AND OBJECTIVE BOX
OVERNIGHT EVENTS: BELEN    SUBJECTIVE / INTERVAL HPI: Patient seen and examined at bedside. AOx3. Denies pain in left leg this morning while sitting, mild pain while attempting to lift leg. ROS otherwise negative.     VITAL SIGNS:  Vital Signs Last 24 Hrs  T(C): 36.8 (30 Mar 2023 06:28), Max: 37.2 (29 Mar 2023 16:26)  T(F): 98.2 (30 Mar 2023 06:28), Max: 98.9 (29 Mar 2023 16:26)  HR: 79 (30 Mar 2023 06:28) (71 - 87)  BP: 117/69 (30 Mar 2023 06:28) (114/68 - 127/68)  BP(mean): --  RR: 16 (30 Mar 2023 06:28) (16 - 16)  SpO2: 94% (30 Mar 2023 06:28) (92% - 95%)    Parameters below as of 30 Mar 2023 06:28  Patient On (Oxygen Delivery Method): room air      I&O's Summary    29 Mar 2023 07:01  -  30 Mar 2023 07:00  --------------------------------------------------------  IN: 680 mL / OUT: 1074 mL / NET: -394 mL        PHYSICAL EXAM:    GENERAL: NAD, lying in bed comfortably  HEAD:  Atraumatic, Normocephalic  EYES: EOMI, PERRLA, conjunctiva and sclera clear  ENT: Moist mucous membranes  NECK: Supple, No JVD  CHEST/LUNG: pectus excavatum, CTA B/L, No rales, rhonchi, wheezing, or rubs. Unlabored respirations  HEART: +S1/S2, Regular rate and rhythm; No murmurs, rubs, or gallops  ABDOMEN: PEG in place, surrounding skin, clean and dry. Soft, Nontender, Nondistended. No hepatomegally  EXTREMITIES:  2+ Peripheral Pulses, brisk capillary refill. No clubbing, cyanosis, or edema  NERVOUS SYSTEM:  Alert & Oriented X3, speech clear. No deficits   MSK: RLE ROM limited 2/2 pain, no ecchymosis or visible hematoma  SKIN: No rashes or lesions    MEDICATIONS:  MEDICATIONS  (STANDING):  famotidine    Tablet 20 milliGRAM(s) Oral two times a day  finasteride 5 milliGRAM(s) Oral daily  heparin   Injectable 5000 Unit(s) SubCutaneous every 8 hours  levothyroxine 75 MICROGram(s) Oral daily  metoprolol tartrate 12.5 milliGRAM(s) Oral every 8 hours  pantoprazole    Tablet 40 milliGRAM(s) Oral before breakfast  polyethylene glycol 3350 17 Gram(s) Oral daily  senna 2 Tablet(s) Oral at bedtime    MEDICATIONS  (PRN):  acetaminophen     Tablet .. 650 milliGRAM(s) Oral every 6 hours PRN Temp greater or equal to 38C (100.4F), Mild Pain (1 - 3)  metoclopramide 10 milliGRAM(s) Oral every 8 hours PRN hiccups      ALLERGIES:  Allergies    penicillin (Rash)    Intolerances        LABS:                        11.3   9.89  )-----------( 174      ( 30 Mar 2023 05:30 )             36.6     03    140  |  106  |  24<H>  ----------------------------<  110<H>  4.2   |  23  |  0.84    Ca    8.2<L>      30 Mar 2023 05:30  Phos  2.5       Mg     2.0         TPro  6.9  /  Alb  3.2<L>  /  TBili  0.5  /  DBili  x   /  AST  23  /  ALT  23  /  AlkPhos  79        Urinalysis Basic - ( 29 Mar 2023 06:21 )    Color: Yellow / Appearance: SL Cloudy / S.020 / pH: x  Gluc: x / Ketone: NEGATIVE  / Bili: Negative / Urobili: 0.2 E.U./dL   Blood: x / Protein: 30 mg/dL / Nitrite: NEGATIVE   Leuk Esterase: Moderate / RBC: < 5 /HPF / WBC Many /HPF   Sq Epi: x / Non Sq Epi: 0-5 /HPF / Bacteria: Present /HPF      CAPILLARY BLOOD GLUCOSE          RADIOLOGY & ADDITIONAL TESTS: Reviewed.

## 2023-03-30 NOTE — CHART NOTE - NSCHARTNOTEFT_GEN_A_CORE
At 12:05PM, MD called wife Julia ([phone]) to update her on CT Pelvis results, which showed interval decreased in right obturator internus intramuscular hematoma and right pelvic sidewall hemorrhage. Discussed today's Hgb 11.3, which is likely a reflection of known hematoma. Patient has not had other signs of bleeding. Physical exam is benign for worsening hematoma. Endorsed to the wife that we will obtain repeat labs in the afternoon to monitor Hgb and if stable, the patient will be medically ready and can pursue the next steps for Rehab. Wife became progressively verbally abusive, stating that he needs more than BRISA and then stated she would rather take him home with home PT. I requested the wife to discuss with SW regarding their preferences for physical therapy multiple times, however she continued to interrupt provider with derogatory comments. I told the wife that she and the patient have the right to refuse BRISA, but should discuss with SW regarding safe dispo for later today (will likely be medically ready, pending labs). She then said "are you crazy?" Wife continued to harass and repeat same questions. Encounter terminated.

## 2023-03-31 DIAGNOSIS — K59.00 CONSTIPATION, UNSPECIFIED: ICD-10-CM

## 2023-03-31 LAB
ANION GAP SERPL CALC-SCNC: 8 MMOL/L — SIGNIFICANT CHANGE UP (ref 5–17)
BUN SERPL-MCNC: 31 MG/DL — HIGH (ref 7–23)
CALCIUM SERPL-MCNC: 8.9 MG/DL — SIGNIFICANT CHANGE UP (ref 8.4–10.5)
CHLORIDE SERPL-SCNC: 105 MMOL/L — SIGNIFICANT CHANGE UP (ref 96–108)
CO2 SERPL-SCNC: 27 MMOL/L — SIGNIFICANT CHANGE UP (ref 22–31)
CREAT SERPL-MCNC: 0.93 MG/DL — SIGNIFICANT CHANGE UP (ref 0.5–1.3)
EGFR: 78 ML/MIN/1.73M2 — SIGNIFICANT CHANGE UP
GLUCOSE SERPL-MCNC: 118 MG/DL — HIGH (ref 70–99)
HCT VFR BLD CALC: 39.5 % — SIGNIFICANT CHANGE UP (ref 39–50)
HGB BLD-MCNC: 12.3 G/DL — LOW (ref 13–17)
MAGNESIUM SERPL-MCNC: 2.2 MG/DL — SIGNIFICANT CHANGE UP (ref 1.6–2.6)
MCHC RBC-ENTMCNC: 27.5 PG — SIGNIFICANT CHANGE UP (ref 27–34)
MCHC RBC-ENTMCNC: 31.1 GM/DL — LOW (ref 32–36)
MCV RBC AUTO: 88.2 FL — SIGNIFICANT CHANGE UP (ref 80–100)
NRBC # BLD: 0 /100 WBCS — SIGNIFICANT CHANGE UP (ref 0–0)
PHOSPHATE SERPL-MCNC: 3.8 MG/DL — SIGNIFICANT CHANGE UP (ref 2.5–4.5)
PLATELET # BLD AUTO: 225 K/UL — SIGNIFICANT CHANGE UP (ref 150–400)
POTASSIUM SERPL-MCNC: 4.9 MMOL/L — SIGNIFICANT CHANGE UP (ref 3.5–5.3)
POTASSIUM SERPL-SCNC: 4.9 MMOL/L — SIGNIFICANT CHANGE UP (ref 3.5–5.3)
RBC # BLD: 4.48 M/UL — SIGNIFICANT CHANGE UP (ref 4.2–5.8)
RBC # FLD: 14.8 % — HIGH (ref 10.3–14.5)
SODIUM SERPL-SCNC: 140 MMOL/L — SIGNIFICANT CHANGE UP (ref 135–145)
WBC # BLD: 11.6 K/UL — HIGH (ref 3.8–10.5)
WBC # FLD AUTO: 11.6 K/UL — HIGH (ref 3.8–10.5)

## 2023-03-31 PROCEDURE — 74019 RADEX ABDOMEN 2 VIEWS: CPT | Mod: 26

## 2023-03-31 PROCEDURE — 99232 SBSQ HOSP IP/OBS MODERATE 35: CPT

## 2023-03-31 RX ORDER — SENNA PLUS 8.6 MG/1
2 TABLET ORAL ONCE
Refills: 0 | Status: COMPLETED | OUTPATIENT
Start: 2023-03-31 | End: 2023-03-31

## 2023-03-31 RX ORDER — OXYCODONE HYDROCHLORIDE 5 MG/1
2.5 TABLET ORAL EVERY 12 HOURS
Refills: 0 | Status: DISCONTINUED | OUTPATIENT
Start: 2023-03-31 | End: 2023-04-03

## 2023-03-31 RX ADMIN — Medication 75 MICROGRAM(S): at 06:24

## 2023-03-31 RX ADMIN — Medication 650 MILLIGRAM(S): at 06:20

## 2023-03-31 RX ADMIN — Medication 650 MILLIGRAM(S): at 12:57

## 2023-03-31 RX ADMIN — PANTOPRAZOLE SODIUM 40 MILLIGRAM(S): 20 TABLET, DELAYED RELEASE ORAL at 06:20

## 2023-03-31 RX ADMIN — FAMOTIDINE 10 MILLIGRAM(S): 10 INJECTION INTRAVENOUS at 06:19

## 2023-03-31 RX ADMIN — HEPARIN SODIUM 5000 UNIT(S): 5000 INJECTION INTRAVENOUS; SUBCUTANEOUS at 21:36

## 2023-03-31 RX ADMIN — Medication 650 MILLIGRAM(S): at 11:57

## 2023-03-31 RX ADMIN — FINASTERIDE 5 MILLIGRAM(S): 5 TABLET, FILM COATED ORAL at 11:55

## 2023-03-31 RX ADMIN — OXYCODONE HYDROCHLORIDE 2.5 MILLIGRAM(S): 5 TABLET ORAL at 13:21

## 2023-03-31 RX ADMIN — Medication 650 MILLIGRAM(S): at 07:20

## 2023-03-31 RX ADMIN — Medication 12.5 MILLIGRAM(S): at 21:36

## 2023-03-31 RX ADMIN — Medication 12.5 MILLIGRAM(S): at 06:19

## 2023-03-31 RX ADMIN — OXYCODONE HYDROCHLORIDE 2.5 MILLIGRAM(S): 5 TABLET ORAL at 12:21

## 2023-03-31 RX ADMIN — HEPARIN SODIUM 5000 UNIT(S): 5000 INJECTION INTRAVENOUS; SUBCUTANEOUS at 14:38

## 2023-03-31 RX ADMIN — Medication 12.5 MILLIGRAM(S): at 14:38

## 2023-03-31 RX ADMIN — FAMOTIDINE 10 MILLIGRAM(S): 10 INJECTION INTRAVENOUS at 17:44

## 2023-03-31 RX ADMIN — HEPARIN SODIUM 5000 UNIT(S): 5000 INJECTION INTRAVENOUS; SUBCUTANEOUS at 06:19

## 2023-03-31 NOTE — PROGRESS NOTE ADULT - SUBJECTIVE AND OBJECTIVE BOX
OVERNIGHT EVENTS: BELEN    SUBJECTIVE / INTERVAL HPI: Patient seen and examined at bedside. Reports he had one small BM last night. Feeling well this morning, no pain in his left leg. ROS otherwise negative.     VITAL SIGNS:  Vital Signs Last 24 Hrs  T(C): 36.4 (31 Mar 2023 09:00), Max: 36.7 (31 Mar 2023 06:13)  T(F): 97.5 (31 Mar 2023 09:00), Max: 98.1 (31 Mar 2023 06:13)  HR: 64 (31 Mar 2023 09:00) (62 - 84)  BP: 124/71 (31 Mar 2023 09:00) (114/66 - 146/76)  BP(mean): --  RR: 18 (31 Mar 2023 09:00) (17 - 18)  SpO2: 93% (31 Mar 2023 09:00) (93% - 97%)    Parameters below as of 31 Mar 2023 09:00  Patient On (Oxygen Delivery Method): room air      I&O's Summary    30 Mar 2023 07:01  -  31 Mar 2023 07:00  --------------------------------------------------------  IN: 0 mL / OUT: 600 mL / NET: -600 mL        PHYSICAL EXAM:    GENERAL: NAD, lying in bed comfortably  HEAD:  Atraumatic, Normocephalic  EYES: EOMI, PERRLA, conjunctiva and sclera clear  ENT: Moist mucous membranes  NECK: Supple, No JVD  CHEST/LUNG: pectus excavatum, CTA B/L, No rales, rhonchi, wheezing, or rubs. Unlabored respirations  HEART: +S1/S2, Regular rate and rhythm; No murmurs, rubs, or gallops  ABDOMEN: PEG in place, surrounding skin, clean and dry. Soft, Nontender, Mild distension. No hepatomegally  EXTREMITIES:  2+ Peripheral Pulses, brisk capillary refill. No clubbing, cyanosis, or edema  NERVOUS SYSTEM:  Alert & Oriented X3, speech clear. No deficits   MSK: RLE ROM limited, no ecchymosis or visible hematoma  SKIN: No rashes or lesions    MEDICATIONS:  MEDICATIONS  (STANDING):  famotidine    Tablet 10 milliGRAM(s) Oral two times a day  finasteride 5 milliGRAM(s) Oral daily  heparin   Injectable 5000 Unit(s) SubCutaneous every 8 hours  levothyroxine 75 MICROGram(s) Oral daily  metoprolol tartrate 12.5 milliGRAM(s) Oral every 8 hours  pantoprazole    Tablet 40 milliGRAM(s) Oral before breakfast  polyethylene glycol 3350 17 Gram(s) Oral daily  senna 2 Tablet(s) Oral at bedtime    MEDICATIONS  (PRN):  acetaminophen     Tablet .. 650 milliGRAM(s) Oral every 6 hours PRN Temp greater or equal to 38C (100.4F), Mild Pain (1 - 3)  metoclopramide 10 milliGRAM(s) Oral every 8 hours PRN hiccups      ALLERGIES:  Allergies    penicillin (Rash)    Intolerances        LABS:                        12.3   11.60 )-----------( 225      ( 31 Mar 2023 06:15 )             39.5     03-31    140  |  105  |  31<H>  ----------------------------<  118<H>  4.9   |  27  |  0.93    Ca    8.9      31 Mar 2023 06:15  Phos  3.8     03-31  Mg     2.2     03-31          CAPILLARY BLOOD GLUCOSE          RADIOLOGY & ADDITIONAL TESTS: Reviewed.

## 2023-03-31 NOTE — PROGRESS NOTE ADULT - PROBLEM SELECTOR PLAN 2
#R pelvis hemorrhage    CT Pelvis showing Moderate to large degree of right-sided pelvic sidewall hemorrhage extending along the right lateral aspect of the urinary bladder. Right obturator internus intramuscular hematoma.    Plan:  - repeat CT Pelvis for shows interval improvement of hematoma and hemorrhage   - hematoma not appreciated on physical exam  - Hg downtrended to 11 however likely dilutional, repeat CBC in PM  - c/w DVT ppx heparin subq  - Maintain active T&S #R pelvis hemorrhage    CT Pelvis showing Moderate to large degree of right-sided pelvic sidewall hemorrhage extending along the right lateral aspect of the urinary bladder. Right obturator internus intramuscular hematoma.    Plan:  - repeat CT Pelvis for shows interval improvement of hematoma and hemorrhage   - hematoma not appreciated on physical exam  - Hg stable  - c/w DVT ppx heparin subq  - Maintain active T&S

## 2023-03-31 NOTE — PROGRESS NOTE ADULT - PROBLEM SELECTOR PLAN 5
Hx of paroxysmal atrial fibrillation, no longer on eliquis. Takes metoprolol 12.5mg once a day for rate control. NSR.  - c/w home medication  - Unclear actual home dose as wife has contacted providers several times changing BP dose.  - c/w 12.5 TID dosing at this time -- resolved. Pt presenting with WBC of 18, afebrile. Otherwise, not meeting SIRS criteria. Likely 2/2 to trauma.    Plan:   - downtrending, continue to monitor

## 2023-03-31 NOTE — OCCUPATIONAL THERAPY INITIAL EVALUATION ADULT - MODALITIES TREATMENT COMMENTS
Pt able to tolerate standing with Min Ax2 using RW for ~3 min during toilet hygiene (soiled with BM) requiring Max Ax1 for pericare 2/2 impaired ability to weight shift and impaired functional reach. Pt unable to ambulate at this time 2/2 impaired ability to weight shift onto RLE.

## 2023-03-31 NOTE — OCCUPATIONAL THERAPY INITIAL EVALUATION ADULT - PERTINENT HX OF CURRENT PROBLEM, REHAB EVAL
89 y/o M pt with PM-Hx of HTN, hypothyroid, esophageal spasm, BPH, urinary retention requiring self-cath, SBO s/p partial resection, presents following fall, found to have R side acetabular fracture, R sacral fracture and R rami fractures, non-surgical per ortho. Admitted to [facility] for medical management.

## 2023-03-31 NOTE — PROGRESS NOTE ADULT - SUBJECTIVE AND OBJECTIVE BOX
Ortho Note    Pt comfortable without complaints, pain controlled  Denies CP, SOB, N/V, new numbness/tingling   Has not had bowel movement, denies flatuance. Endorses increasing stomache pain    Vital Signs Last 24 Hrs  T(C): --  T(F): --  HR: --  BP: --  BP(mean): --  RR: --  SpO2: --  I&O's Summary    29 Mar 2023 07:01  -  30 Mar 2023 07:00  --------------------------------------------------------  IN: 680 mL / OUT: 1074 mL / NET: -394 mL    30 Mar 2023 07:01  -  31 Mar 2023 06:08  --------------------------------------------------------  IN: 0 mL / OUT: 600 mL / NET: -600 mL    PE:  B/L LE: sensation intact  DP 2+  brisk cap refill  EHL/FHL/TA/GS firing bilaterally R < L R 2/2 pain  point tenderness in ASIS R side  skin intact                            11.5   11.36 )-----------( 190      ( 30 Mar 2023 16:42 )             36.8     03-30    140  |  106  |  24<H>  ----------------------------<  110<H>  4.2   |  23  |  0.84    Ca    8.2<L>      30 Mar 2023 05:30        A/P: 89yMale PMH HTN, hypothyroid, esophageal spasm, BPH, urinary retention requiring self-cath, SBO s/p partial resection, and malnutrition with PEG tube,   s/p fall off physician exam table presenting with R side acetabular fx  -frailty, med admit  -Toe touch weight bearing r side  -non-operative management at the current time  -PT eval  -DVT proph: primary team dispo  -Patient endorses abdominal pain, no bowel movement or flatus with increased distention of abdomen. Please consider ileus vs SBO and order abdominal xray in working up pts new acute onset sxs  - Patient history, exam, imaging and plan as per discussion with Dr. Cifuentes      Ortho Pager [phone]    Ortho Pager [phone] Ortho Note    Pt comfortable without complaints, pain controlled  Denies CP, SOB, N/V, new numbness/tingling   denies flatuance. Endorses increasing stomache pain. unclear last bm     Vital Signs Last 24 Hrs  T(C): --  T(F): --  HR: --  BP: --  BP(mean): --  RR: --  SpO2: --  I&O's Summary    29 Mar 2023 07:01  -  30 Mar 2023 07:00  --------------------------------------------------------  IN: 680 mL / OUT: 1074 mL / NET: -394 mL    30 Mar 2023 07:01  -  31 Mar 2023 06:08  --------------------------------------------------------  IN: 0 mL / OUT: 600 mL / NET: -600 mL    PE:  B/L LE: sensation intact  DP 2+  brisk cap refill  EHL/FHL/TA/GS firing bilaterally R < L R 2/2 pain  point tenderness in ASIS R side  skin intact                            11.5   11.36 )-----------( 190      ( 30 Mar 2023 16:42 )             36.8     03-30    140  |  106  |  24<H>  ----------------------------<  110<H>  4.2   |  23  |  0.84    Ca    8.2<L>      30 Mar 2023 05:30        A/P: 89yMale PMH HTN, hypothyroid, esophageal spasm, BPH, urinary retention requiring self-cath, SBO s/p partial resection, and malnutrition with PEG tube,   s/p fall off physician exam table presenting with R side acetabular fx  -frailty, med admit  -Toe touch weight bearing r side  -non-operative management at the current time  -PT eval  -DVT proph: primary team dispo  -Patient endorses abdominal pain,  increased distention of abdomen. Please consider serial abdominal exams/further workup  - Patient history, exam, imaging and plan as per discussion with Dr. Cifuentes      Ortho Pager [phone]    Ortho Pager [phone]

## 2023-03-31 NOTE — OCCUPATIONAL THERAPY INITIAL EVALUATION ADULT - GENERAL OBSERVATIONS, REHAB EVAL
OT IE completed. Orders received, chart reviewed, pt cleared for OT by DAVID Chapman. Pt received semi supine in bed, NAD, +heplock. Pt A&Ox4, agreeable to OT, and tolerated session well.

## 2023-03-31 NOTE — OCCUPATIONAL THERAPY INITIAL EVALUATION ADULT - DIAGNOSIS, OT EVAL
Pt presenting with R hip pain, impaired strength, impaired postural control, impaired balance, and impaired functional activity tolerance, impacting ability to perform functional mobility/ADLs.

## 2023-03-31 NOTE — OCCUPATIONAL THERAPY INITIAL EVALUATION ADULT - LEVEL OF INDEPENDENCE:TOILET, OT EVAL
pericare while standing, requiring assist 2/2 impaired functional reach and impaired weight shifting ability/maximum assist (25% patients effort)

## 2023-03-31 NOTE — OCCUPATIONAL THERAPY INITIAL EVALUATION ADULT - NSOTDISCHREC_GEN_A_CORE
Inpatient Rehab-to continue to evaluate and re-assess pt's progress with functional mobility//REDD Metz and MÓNICA Ordaz aware//Pt is highly motivated to participate and improve, with good family/social support upon discharge. Inpatient Rehab-to continue to evaluate and re-assess pt's progress with functional mobility and ADLs//REDD Mezt and MÓNICA Ordaz aware//Pt is highly motivated to participate and improve, with good family/social support upon discharge.

## 2023-03-31 NOTE — PROGRESS NOTE ADULT - PROBLEM SELECTOR PLAN 6
hx of BPH, pt self caths 2-4x a day    Plan:  - intermittent cath q6hrs  - BUS q6hrs Hx of paroxysmal atrial fibrillation, no longer on eliquis. Takes metoprolol 12.5mg once a day for rate control. NSR.  - c/w home medication  - Unclear actual home dose as wife has contacted providers several times changing BP dose.  - c/w 12.5 TID dosing at this time

## 2023-03-31 NOTE — OCCUPATIONAL THERAPY INITIAL EVALUATION ADULT - LEVEL OF INDEPENDENCE: DRESS LOWER BODY, OT EVAL
don socks sitting EOB, requiring assist 2/2 impaired functional reach and postural control/moderate assist (50% patients effort)

## 2023-03-31 NOTE — PROGRESS NOTE ADULT - PROBLEM SELECTOR PLAN 9
Prior history of PE, no longer taking eliquis.     - f/u outpatient pulmonologist Dr. Irving Wilson Home meds: finasteride 5mg PO qd    Plan:  - Cont home meds

## 2023-03-31 NOTE — PROGRESS NOTE ADULT - PROBLEM SELECTOR PLAN 7
Home med: levothyroxine 75mcg PO qd    Plan:  - Cont levothyroxine 75mcg PO qd hx of BPH, pt self caths 2-4x a day    Plan:  - intermittent cath q6hrs  - BUS q6hrs

## 2023-03-31 NOTE — OCCUPATIONAL THERAPY INITIAL EVALUATION ADULT - LEVEL OF INDEPENDENCE: DRESS UPPER BODY, OT EVAL
pt required assist to don back gown sitting EOB 2/2 impaired functional reach (LUE weaker than RUE)/minimum assist (75% patients effort)

## 2023-03-31 NOTE — PROGRESS NOTE ADULT - PROBLEM SELECTOR PLAN 8
Home meds: finasteride 5mg PO qd    Plan:  - Cont home meds Home med: levothyroxine 75mcg PO qd    Plan:  - Cont levothyroxine 75mcg PO qd

## 2023-03-31 NOTE — OCCUPATIONAL THERAPY INITIAL EVALUATION ADULT - PLANNED THERAPY INTERVENTIONS, OT EVAL
ADL retraining/balance training/bed mobility training/motor coordination training/neuromuscular re-education/strengthening/stretching/transfer training

## 2023-03-31 NOTE — PROGRESS NOTE ADULT - PROBLEM SELECTOR PLAN 10
F: none  E: replete K<4, Mg<2  N: regular  DvT: heparin subq  Code: FULL  Dispo: WILL Prior history of PE, no longer taking eliquis.     - f/u outpatient pulmonologist Dr. Irving Wilson

## 2023-03-31 NOTE — PROGRESS NOTE ADULT - PROBLEM SELECTOR PLAN 4
-- resolved. Pt presenting with WBC of 18, afebrile. Otherwise, not meeting SIRS criteria. Likely 2/2 to trauma.    Plan:   - downtrending, continue to monitor CT Pelvis c/w dilated rectum containing large volume stool.     - Large volume BM s/p suppository this AM 3/31  - Abdominal XR with dilated rectum however patient is now s/p large volume BM  - c/w bowel regimen

## 2023-04-01 LAB
ANION GAP SERPL CALC-SCNC: 8 MMOL/L — SIGNIFICANT CHANGE UP (ref 5–17)
BUN SERPL-MCNC: 34 MG/DL — HIGH (ref 7–23)
CALCIUM SERPL-MCNC: 8.5 MG/DL — SIGNIFICANT CHANGE UP (ref 8.4–10.5)
CHLORIDE SERPL-SCNC: 107 MMOL/L — SIGNIFICANT CHANGE UP (ref 96–108)
CO2 SERPL-SCNC: 25 MMOL/L — SIGNIFICANT CHANGE UP (ref 22–31)
CREAT SERPL-MCNC: 0.9 MG/DL — SIGNIFICANT CHANGE UP (ref 0.5–1.3)
EGFR: 81 ML/MIN/1.73M2 — SIGNIFICANT CHANGE UP
GLUCOSE SERPL-MCNC: 114 MG/DL — HIGH (ref 70–99)
HCT VFR BLD CALC: 35.2 % — LOW (ref 39–50)
HGB BLD-MCNC: 11.3 G/DL — LOW (ref 13–17)
MAGNESIUM SERPL-MCNC: 2.1 MG/DL — SIGNIFICANT CHANGE UP (ref 1.6–2.6)
MCHC RBC-ENTMCNC: 27.4 PG — SIGNIFICANT CHANGE UP (ref 27–34)
MCHC RBC-ENTMCNC: 32.1 GM/DL — SIGNIFICANT CHANGE UP (ref 32–36)
MCV RBC AUTO: 85.2 FL — SIGNIFICANT CHANGE UP (ref 80–100)
NRBC # BLD: 0 /100 WBCS — SIGNIFICANT CHANGE UP (ref 0–0)
PHOSPHATE SERPL-MCNC: 3.5 MG/DL — SIGNIFICANT CHANGE UP (ref 2.5–4.5)
PLATELET # BLD AUTO: 248 K/UL — SIGNIFICANT CHANGE UP (ref 150–400)
POTASSIUM SERPL-MCNC: 4.4 MMOL/L — SIGNIFICANT CHANGE UP (ref 3.5–5.3)
POTASSIUM SERPL-SCNC: 4.4 MMOL/L — SIGNIFICANT CHANGE UP (ref 3.5–5.3)
RBC # BLD: 4.13 M/UL — LOW (ref 4.2–5.8)
RBC # FLD: 15 % — HIGH (ref 10.3–14.5)
SODIUM SERPL-SCNC: 140 MMOL/L — SIGNIFICANT CHANGE UP (ref 135–145)
WBC # BLD: 11.57 K/UL — HIGH (ref 3.8–10.5)
WBC # FLD AUTO: 11.57 K/UL — HIGH (ref 3.8–10.5)

## 2023-04-01 PROCEDURE — 99232 SBSQ HOSP IP/OBS MODERATE 35: CPT | Mod: GC

## 2023-04-01 RX ADMIN — Medication 650 MILLIGRAM(S): at 06:09

## 2023-04-01 RX ADMIN — Medication 10 MILLIGRAM(S): at 23:52

## 2023-04-01 RX ADMIN — HEPARIN SODIUM 5000 UNIT(S): 5000 INJECTION INTRAVENOUS; SUBCUTANEOUS at 15:49

## 2023-04-01 RX ADMIN — FINASTERIDE 5 MILLIGRAM(S): 5 TABLET, FILM COATED ORAL at 11:40

## 2023-04-01 RX ADMIN — Medication 12.5 MILLIGRAM(S): at 15:49

## 2023-04-01 RX ADMIN — FAMOTIDINE 10 MILLIGRAM(S): 10 INJECTION INTRAVENOUS at 06:10

## 2023-04-01 RX ADMIN — OXYCODONE HYDROCHLORIDE 2.5 MILLIGRAM(S): 5 TABLET ORAL at 23:54

## 2023-04-01 RX ADMIN — PANTOPRAZOLE SODIUM 40 MILLIGRAM(S): 20 TABLET, DELAYED RELEASE ORAL at 06:10

## 2023-04-01 RX ADMIN — Medication 75 MICROGRAM(S): at 06:10

## 2023-04-01 RX ADMIN — OXYCODONE HYDROCHLORIDE 2.5 MILLIGRAM(S): 5 TABLET ORAL at 11:40

## 2023-04-01 RX ADMIN — HEPARIN SODIUM 5000 UNIT(S): 5000 INJECTION INTRAVENOUS; SUBCUTANEOUS at 23:53

## 2023-04-01 RX ADMIN — Medication 12.5 MILLIGRAM(S): at 23:55

## 2023-04-01 RX ADMIN — Medication 650 MILLIGRAM(S): at 07:09

## 2023-04-01 RX ADMIN — HEPARIN SODIUM 5000 UNIT(S): 5000 INJECTION INTRAVENOUS; SUBCUTANEOUS at 06:10

## 2023-04-01 RX ADMIN — OXYCODONE HYDROCHLORIDE 2.5 MILLIGRAM(S): 5 TABLET ORAL at 12:40

## 2023-04-01 RX ADMIN — SENNA PLUS 2 TABLET(S): 8.6 TABLET ORAL at 23:52

## 2023-04-01 RX ADMIN — FAMOTIDINE 10 MILLIGRAM(S): 10 INJECTION INTRAVENOUS at 18:05

## 2023-04-01 RX ADMIN — Medication 650 MILLIGRAM(S): at 12:40

## 2023-04-01 RX ADMIN — Medication 650 MILLIGRAM(S): at 11:40

## 2023-04-01 RX ADMIN — Medication 12.5 MILLIGRAM(S): at 06:10

## 2023-04-01 NOTE — PROGRESS NOTE ADULT - PROBLEM SELECTOR PLAN 4
CT Pelvis c/w dilated rectum containing large volume stool.     - Large volume BM s/p suppository this AM 3/31  - Abdominal XR with dilated rectum however patient is now s/p large volume BM  - c/w bowel regimen

## 2023-04-01 NOTE — PROGRESS NOTE ADULT - SUBJECTIVE AND OBJECTIVE BOX
OVERNIGHT EVENTS: BELEN    SUBJECTIVE / INTERVAL HPI: Patient seen and examined at bedside. Reports he had one large BM last night. Feeling well this morning, no pain in his left leg. ROS otherwise negative.       Vital Signs Last 24 Hrs  T(C): 36.6 (01 Apr 2023 08:30), Max: 36.7 (31 Mar 2023 20:49)  T(F): 97.9 (01 Apr 2023 08:30), Max: 98.1 (31 Mar 2023 20:49)  HR: 84 (01 Apr 2023 08:30) (78 - 86)  BP: 133/69 (01 Apr 2023 08:30) (123/69 - 139/81)  BP(mean): 90 (01 Apr 2023 08:30) (90 - 90)  RR: 18 (01 Apr 2023 08:30) (17 - 19)  SpO2: 98% (01 Apr 2023 08:30) (93% - 98%)    Parameters below as of 01 Apr 2023 08:30  Patient On (Oxygen Delivery Method): room air            PHYSICAL EXAM:    GENERAL: NAD, lying in bed comfortably  HEAD:  Atraumatic, Normocephalic  EYES: EOMI, PERRLA, conjunctiva and sclera clear  ENT: Moist mucous membranes  NECK: Supple, No JVD  CHEST/LUNG: pectus excavatum, CTA B/L, No rales, rhonchi, wheezing, or rubs. Unlabored respirations  HEART: +S1/S2, Regular rate and rhythm; No murmurs, rubs, or gallops  ABDOMEN: PEG in place, surrounding skin, clean and dry. Soft, Nontender, Mild distension. No hepatomegally  EXTREMITIES:  2+ Peripheral Pulses, brisk capillary refill. No clubbing, cyanosis, or edema  NERVOUS SYSTEM:  Alert & Oriented X3, speech clear. No deficits   MSK: RLE ROM limited, no ecchymosis or visible hematoma  SKIN: No rashes or lesions      MEDICATIONS  (STANDING):  famotidine    Tablet 10 milliGRAM(s) Oral two times a day  finasteride 5 milliGRAM(s) Oral daily  heparin   Injectable 5000 Unit(s) SubCutaneous every 8 hours  levothyroxine 75 MICROGram(s) Oral daily  metoprolol tartrate 12.5 milliGRAM(s) Oral every 8 hours  pantoprazole    Tablet 40 milliGRAM(s) Oral before breakfast  polyethylene glycol 3350 17 Gram(s) Oral daily  senna 2 Tablet(s) Oral at bedtime    MEDICATIONS  (PRN):  acetaminophen     Tablet .. 650 milliGRAM(s) Oral every 6 hours PRN Temp greater or equal to 38C (100.4F), Mild Pain (1 - 3)  metoclopramide 10 milliGRAM(s) Oral every 8 hours PRN hiccups  oxyCODONE    IR 2.5 milliGRAM(s) Oral every 12 hours PRN Moderate Pain (4 - 6)          ALLERGIES:  Allergies    penicillin (Rash)    Intolerances        LABS:                                   11.3   11.57 )-----------( 248      ( 01 Apr 2023 06:59 )             35.2   04-01    140  |  107  |  34<H>  ----------------------------<  114<H>  4.4   |  25  |  0.90    Ca    8.5      01 Apr 2023 06:59  Phos  3.5     04-01  Mg     2.1     04-01              CAPILLARY BLOOD GLUCOSE          RADIOLOGY & ADDITIONAL TESTS: Reviewed.

## 2023-04-01 NOTE — PROGRESS NOTE ADULT - PROBLEM SELECTOR PLAN 2
#R pelvis hemorrhage    CT Pelvis showing Moderate to large degree of right-sided pelvic sidewall hemorrhage extending along the right lateral aspect of the urinary bladder. Right obturator internus intramuscular hematoma.    Plan:  - repeat CT Pelvis for shows interval improvement of hematoma and hemorrhage   - hematoma not appreciated on physical exam  - Hg stable  - c/w DVT ppx heparin subq  - Maintain active T&S

## 2023-04-02 LAB
ALBUMIN SERPL ELPH-MCNC: 2.7 G/DL — LOW (ref 3.3–5)
ALP SERPL-CCNC: 68 U/L — SIGNIFICANT CHANGE UP (ref 40–120)
ALT FLD-CCNC: 20 U/L — SIGNIFICANT CHANGE UP (ref 10–45)
ANION GAP SERPL CALC-SCNC: 12 MMOL/L — SIGNIFICANT CHANGE UP (ref 5–17)
AST SERPL-CCNC: 16 U/L — SIGNIFICANT CHANGE UP (ref 10–40)
BASOPHILS # BLD AUTO: 0.04 K/UL — SIGNIFICANT CHANGE UP (ref 0–0.2)
BASOPHILS NFR BLD AUTO: 0.5 % — SIGNIFICANT CHANGE UP (ref 0–2)
BILIRUB SERPL-MCNC: 0.4 MG/DL — SIGNIFICANT CHANGE UP (ref 0.2–1.2)
BLD GP AB SCN SERPL QL: NEGATIVE — SIGNIFICANT CHANGE UP
BUN SERPL-MCNC: 35 MG/DL — HIGH (ref 7–23)
CALCIUM SERPL-MCNC: 8.2 MG/DL — LOW (ref 8.4–10.5)
CHLORIDE SERPL-SCNC: 105 MMOL/L — SIGNIFICANT CHANGE UP (ref 96–108)
CO2 SERPL-SCNC: 24 MMOL/L — SIGNIFICANT CHANGE UP (ref 22–31)
CREAT SERPL-MCNC: 0.94 MG/DL — SIGNIFICANT CHANGE UP (ref 0.5–1.3)
EGFR: 77 ML/MIN/1.73M2 — SIGNIFICANT CHANGE UP
EOSINOPHIL # BLD AUTO: 0.29 K/UL — SIGNIFICANT CHANGE UP (ref 0–0.5)
EOSINOPHIL NFR BLD AUTO: 3.3 % — SIGNIFICANT CHANGE UP (ref 0–6)
GLUCOSE SERPL-MCNC: 104 MG/DL — HIGH (ref 70–99)
HCT VFR BLD CALC: 34.8 % — LOW (ref 39–50)
HGB BLD-MCNC: 10.8 G/DL — LOW (ref 13–17)
IMM GRANULOCYTES NFR BLD AUTO: 0.2 % — SIGNIFICANT CHANGE UP (ref 0–0.9)
LYMPHOCYTES # BLD AUTO: 2.23 K/UL — SIGNIFICANT CHANGE UP (ref 1–3.3)
LYMPHOCYTES # BLD AUTO: 25.1 % — SIGNIFICANT CHANGE UP (ref 13–44)
MAGNESIUM SERPL-MCNC: 2.2 MG/DL — SIGNIFICANT CHANGE UP (ref 1.6–2.6)
MCHC RBC-ENTMCNC: 27.3 PG — SIGNIFICANT CHANGE UP (ref 27–34)
MCHC RBC-ENTMCNC: 31 GM/DL — LOW (ref 32–36)
MCV RBC AUTO: 87.9 FL — SIGNIFICANT CHANGE UP (ref 80–100)
MONOCYTES # BLD AUTO: 1.06 K/UL — HIGH (ref 0–0.9)
MONOCYTES NFR BLD AUTO: 11.9 % — SIGNIFICANT CHANGE UP (ref 2–14)
NEUTROPHILS # BLD AUTO: 5.24 K/UL — SIGNIFICANT CHANGE UP (ref 1.8–7.4)
NEUTROPHILS NFR BLD AUTO: 59 % — SIGNIFICANT CHANGE UP (ref 43–77)
NRBC # BLD: 0 /100 WBCS — SIGNIFICANT CHANGE UP (ref 0–0)
PHOSPHATE SERPL-MCNC: 3.8 MG/DL — SIGNIFICANT CHANGE UP (ref 2.5–4.5)
PLATELET # BLD AUTO: 262 K/UL — SIGNIFICANT CHANGE UP (ref 150–400)
POTASSIUM SERPL-MCNC: 4.4 MMOL/L — SIGNIFICANT CHANGE UP (ref 3.5–5.3)
POTASSIUM SERPL-SCNC: 4.4 MMOL/L — SIGNIFICANT CHANGE UP (ref 3.5–5.3)
PROT SERPL-MCNC: 5.9 G/DL — LOW (ref 6–8.3)
RBC # BLD: 3.96 M/UL — LOW (ref 4.2–5.8)
RBC # FLD: 15.1 % — HIGH (ref 10.3–14.5)
RH IG SCN BLD-IMP: POSITIVE — SIGNIFICANT CHANGE UP
SODIUM SERPL-SCNC: 141 MMOL/L — SIGNIFICANT CHANGE UP (ref 135–145)
WBC # BLD: 8.88 K/UL — SIGNIFICANT CHANGE UP (ref 3.8–10.5)
WBC # FLD AUTO: 8.88 K/UL — SIGNIFICANT CHANGE UP (ref 3.8–10.5)

## 2023-04-02 PROCEDURE — 99232 SBSQ HOSP IP/OBS MODERATE 35: CPT | Mod: GC

## 2023-04-02 RX ORDER — SODIUM CHLORIDE 9 MG/ML
500 INJECTION INTRAMUSCULAR; INTRAVENOUS; SUBCUTANEOUS
Refills: 0 | Status: DISCONTINUED | OUTPATIENT
Start: 2023-04-02 | End: 2023-04-03

## 2023-04-02 RX ADMIN — SODIUM CHLORIDE 75 MILLILITER(S): 9 INJECTION INTRAMUSCULAR; INTRAVENOUS; SUBCUTANEOUS at 17:47

## 2023-04-02 RX ADMIN — HEPARIN SODIUM 5000 UNIT(S): 5000 INJECTION INTRAVENOUS; SUBCUTANEOUS at 06:28

## 2023-04-02 RX ADMIN — OXYCODONE HYDROCHLORIDE 2.5 MILLIGRAM(S): 5 TABLET ORAL at 13:00

## 2023-04-02 RX ADMIN — Medication 12.5 MILLIGRAM(S): at 16:07

## 2023-04-02 RX ADMIN — PANTOPRAZOLE SODIUM 40 MILLIGRAM(S): 20 TABLET, DELAYED RELEASE ORAL at 07:30

## 2023-04-02 RX ADMIN — Medication 75 MICROGRAM(S): at 06:27

## 2023-04-02 RX ADMIN — Medication 12.5 MILLIGRAM(S): at 06:29

## 2023-04-02 RX ADMIN — OXYCODONE HYDROCHLORIDE 2.5 MILLIGRAM(S): 5 TABLET ORAL at 00:50

## 2023-04-02 RX ADMIN — HEPARIN SODIUM 5000 UNIT(S): 5000 INJECTION INTRAVENOUS; SUBCUTANEOUS at 22:57

## 2023-04-02 RX ADMIN — HEPARIN SODIUM 5000 UNIT(S): 5000 INJECTION INTRAVENOUS; SUBCUTANEOUS at 16:08

## 2023-04-02 RX ADMIN — POLYETHYLENE GLYCOL 3350 17 GRAM(S): 17 POWDER, FOR SOLUTION ORAL at 12:08

## 2023-04-02 RX ADMIN — FINASTERIDE 5 MILLIGRAM(S): 5 TABLET, FILM COATED ORAL at 12:00

## 2023-04-02 RX ADMIN — Medication 12.5 MILLIGRAM(S): at 22:57

## 2023-04-02 RX ADMIN — Medication 650 MILLIGRAM(S): at 11:20

## 2023-04-02 RX ADMIN — FAMOTIDINE 10 MILLIGRAM(S): 10 INJECTION INTRAVENOUS at 17:04

## 2023-04-02 RX ADMIN — FAMOTIDINE 10 MILLIGRAM(S): 10 INJECTION INTRAVENOUS at 06:26

## 2023-04-02 RX ADMIN — SENNA PLUS 2 TABLET(S): 8.6 TABLET ORAL at 22:57

## 2023-04-02 RX ADMIN — Medication 650 MILLIGRAM(S): at 10:28

## 2023-04-02 RX ADMIN — OXYCODONE HYDROCHLORIDE 2.5 MILLIGRAM(S): 5 TABLET ORAL at 12:01

## 2023-04-02 NOTE — PROGRESS NOTE ADULT - ASSESSMENT
89 y/o M pt with PM-Hx of HTN, hypothyroid, esophageal spasm, BPH, urinary retention requiring self-cath, SBO s/p partial resection, presents following fall, found to have R side acetabular fracture, R sacral fracture and R rami fractures, non-surgical per ortho. Admitted to [facility] for medical management  
89 y/o M pt with PM-Hx of HTN, hypothyroid, esophageal spasm, BPH, urinary retention requiring self-cath, SBO s/p partial resection, presents following fall, found to have R side acetabular fracture, R sacral fracture and R rami fractures, non-surgical per ortho. Admitted to [facility] for medical management  
89 y/o M pt with PM-Hx of HTN, hypothyroid, esophageal spasm, BPH, urinary retention requiring self-cath, SBO s/p partial resection, presents following fall, found to have R side acetabular fracture, R sacral fracture and R rami fractures, non-surgical per ortho. Admitted to [facility] for medical management  
89 y/o M pt with PM-Hx of HTN, hypothyroid, esophageal spasm, BPH, urinary retention requiring self-cath, SBO s/p partial resection, presents following fall, found to have R side acetabular fracture, R sacral fracture and R rami fractures, non-surgical per ortho. Admitted to [facility] for medical management  
89 y/o M pt with PM-Hx of HTN, hypothyroid, esophageal spasm, BPH, urinary retention requiring self-cath, SBO s/p partial resection, presents following fall, found to have R side acetabular fracture, R sacral fracture and R rami fractures, non-surgical per ortho. Admitted to [facility] for medical management.  
89 y/o M pt with PM-Hx of HTN, hypothyroid, esophageal spasm, BPH, urinary retention requiring self-cath, SBO s/p partial resection, presents following fall, found to have R side acetabular fracture, R sacral fracture and R rami fractures, non-surgical per ortho. Admitted to [facility] for medical management

## 2023-04-02 NOTE — PROGRESS NOTE ADULT - PROBLEM SELECTOR PROBLEM 8
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 Pt verbalized understanding  Chlorhexidine wash instructions given  Pending: Medical clearance - Abnormal ekg    Problem: JOSH (obstructive sleep apnea)  Assessment and Plan: dx 2011 - denies CPAP use

## 2023-04-02 NOTE — PROGRESS NOTE ADULT - PROBLEM SELECTOR PROBLEM 2
Fracture of sacrum
Intramuscular hematoma

## 2023-04-02 NOTE — PROGRESS NOTE ADULT - SUBJECTIVE AND OBJECTIVE BOX
OVERNIGHT EVENTS: BELEN    SUBJECTIVE / INTERVAL HPI: Patient seen and examined at bedside. Slept well overnight, no acute complaints. Had a BM yesterday. Denies fever, chills, N/V/D/C, abdominal pain, LE edema.     VITAL SIGNS:  Vital Signs Last 24 Hrs  T(C): 36.4 (02 Apr 2023 11:02), Max: 36.8 (01 Apr 2023 16:38)  T(F): 97.6 (02 Apr 2023 11:02), Max: 98.2 (01 Apr 2023 16:38)  HR: 81 (02 Apr 2023 11:02) (77 - 91)  BP: 126/67 (02 Apr 2023 11:02) (119/69 - 137/66)  BP(mean): 86 (02 Apr 2023 11:02) (86 - 86)  RR: 16 (02 Apr 2023 11:02) (16 - 18)  SpO2: 95% (02 Apr 2023 11:02) (91% - 95%)    Parameters below as of 02 Apr 2023 11:02  Patient On (Oxygen Delivery Method): room air      I&O's Summary    01 Apr 2023 07:01  -  02 Apr 2023 07:00  --------------------------------------------------------  IN: 240 mL / OUT: 400 mL / NET: -160 mL        PHYSICAL EXAM:    GENERAL: NAD, lying in bed comfortably  HEAD:  Atraumatic, Normocephalic  EYES: EOMI, PERRLA, conjunctiva and sclera clear  ENT: Moist mucous membranes  NECK: Supple, No JVD  CHEST/LUNG: pectus excavatum, CTA B/L, No rales, rhonchi, wheezing, or rubs. Unlabored respirations  HEART: +S1/S2, Regular rate and rhythm; No murmurs, rubs, or gallops  ABDOMEN: PEG in place, surrounding skin, clean and dry. Soft, Nontender, Mild distension. No hepatomegally  EXTREMITIES:  2+ Peripheral Pulses, brisk capillary refill. No clubbing, cyanosis, or edema  NERVOUS SYSTEM:  Alert & Oriented X3, speech clear. No deficits   MSK: RLE ROM limited, no ecchymosis or visible hematoma  SKIN: No rashes or lesions    MEDICATIONS:  MEDICATIONS  (STANDING):  famotidine    Tablet 10 milliGRAM(s) Oral two times a day  finasteride 5 milliGRAM(s) Oral daily  heparin   Injectable 5000 Unit(s) SubCutaneous every 8 hours  levothyroxine 75 MICROGram(s) Oral daily  metoprolol tartrate 12.5 milliGRAM(s) Oral every 8 hours  pantoprazole    Tablet 40 milliGRAM(s) Oral before breakfast  polyethylene glycol 3350 17 Gram(s) Oral daily  senna 2 Tablet(s) Oral at bedtime    MEDICATIONS  (PRN):  acetaminophen     Tablet .. 650 milliGRAM(s) Oral every 6 hours PRN Temp greater or equal to 38C (100.4F), Mild Pain (1 - 3)  metoclopramide 10 milliGRAM(s) Oral every 8 hours PRN hiccups  oxyCODONE    IR 2.5 milliGRAM(s) Oral every 12 hours PRN Moderate Pain (4 - 6)      ALLERGIES:  Allergies    penicillin (Rash)    Intolerances        LABS:                        10.8   8.88  )-----------( 262      ( 02 Apr 2023 07:55 )             34.8     04-02    141  |  105  |  35<H>  ----------------------------<  104<H>  4.4   |  24  |  0.94    Ca    8.2<L>      02 Apr 2023 07:55  Phos  3.8     04-02  Mg     2.2     04-02    TPro  5.9<L>  /  Alb  2.7<L>  /  TBili  0.4  /  DBili  x   /  AST  16  /  ALT  20  /  AlkPhos  68  04-02        CAPILLARY BLOOD GLUCOSE          RADIOLOGY & ADDITIONAL TESTS: Reviewed.

## 2023-04-02 NOTE — PROGRESS NOTE ADULT - PROBLEM SELECTOR PLAN 3
Pt has hx of multiple falls.  - Plan as above
- plan as above

## 2023-04-02 NOTE — PROGRESS NOTE ADULT - PROVIDER SPECIALTY LIST ADULT
Internal Medicine
Internal Medicine
Orthopedics
Orthopedics
Internal Medicine
Hospitalist

## 2023-04-02 NOTE — PROGRESS NOTE ADULT - REASON FOR ADMISSION
R acetabular fracture

## 2023-04-02 NOTE — PROGRESS NOTE ADULT - PROBLEM SELECTOR PROBLEM 1
Right acetabular fracture

## 2023-04-03 ENCOUNTER — TRANSCRIPTION ENCOUNTER (OUTPATIENT)
Age: 88
End: 2023-04-03

## 2023-04-03 VITALS — DIASTOLIC BLOOD PRESSURE: 62 MMHG | SYSTOLIC BLOOD PRESSURE: 130 MMHG | HEART RATE: 82 BPM

## 2023-04-03 LAB
ANION GAP SERPL CALC-SCNC: 9 MMOL/L — SIGNIFICANT CHANGE UP (ref 5–17)
BUN SERPL-MCNC: 33 MG/DL — HIGH (ref 7–23)
CALCIUM SERPL-MCNC: 8.5 MG/DL — SIGNIFICANT CHANGE UP (ref 8.4–10.5)
CHLORIDE SERPL-SCNC: 108 MMOL/L — SIGNIFICANT CHANGE UP (ref 96–108)
CO2 SERPL-SCNC: 23 MMOL/L — SIGNIFICANT CHANGE UP (ref 22–31)
CREAT SERPL-MCNC: 0.9 MG/DL — SIGNIFICANT CHANGE UP (ref 0.5–1.3)
EGFR: 81 ML/MIN/1.73M2 — SIGNIFICANT CHANGE UP
GLUCOSE SERPL-MCNC: 102 MG/DL — HIGH (ref 70–99)
HCT VFR BLD CALC: 34.1 % — LOW (ref 39–50)
HGB BLD-MCNC: 10.6 G/DL — LOW (ref 13–17)
MAGNESIUM SERPL-MCNC: 2.2 MG/DL — SIGNIFICANT CHANGE UP (ref 1.6–2.6)
MCHC RBC-ENTMCNC: 27.6 PG — SIGNIFICANT CHANGE UP (ref 27–34)
MCHC RBC-ENTMCNC: 31.1 GM/DL — LOW (ref 32–36)
MCV RBC AUTO: 88.8 FL — SIGNIFICANT CHANGE UP (ref 80–100)
NRBC # BLD: 0 /100 WBCS — SIGNIFICANT CHANGE UP (ref 0–0)
PHOSPHATE SERPL-MCNC: 3.6 MG/DL — SIGNIFICANT CHANGE UP (ref 2.5–4.5)
PLATELET # BLD AUTO: 267 K/UL — SIGNIFICANT CHANGE UP (ref 150–400)
POTASSIUM SERPL-MCNC: 4.6 MMOL/L — SIGNIFICANT CHANGE UP (ref 3.5–5.3)
POTASSIUM SERPL-SCNC: 4.6 MMOL/L — SIGNIFICANT CHANGE UP (ref 3.5–5.3)
RBC # BLD: 3.84 M/UL — LOW (ref 4.2–5.8)
RBC # FLD: 15.6 % — HIGH (ref 10.3–14.5)
SODIUM SERPL-SCNC: 140 MMOL/L — SIGNIFICANT CHANGE UP (ref 135–145)
WBC # BLD: 9.77 K/UL — SIGNIFICANT CHANGE UP (ref 3.8–10.5)
WBC # FLD AUTO: 9.77 K/UL — SIGNIFICANT CHANGE UP (ref 3.8–10.5)

## 2023-04-03 PROCEDURE — 83970 ASSAY OF PARATHORMONE: CPT

## 2023-04-03 PROCEDURE — 84443 ASSAY THYROID STIM HORMONE: CPT

## 2023-04-03 PROCEDURE — 74019 RADEX ABDOMEN 2 VIEWS: CPT

## 2023-04-03 PROCEDURE — 85730 THROMBOPLASTIN TIME PARTIAL: CPT

## 2023-04-03 PROCEDURE — 85025 COMPLETE CBC W/AUTO DIFF WBC: CPT

## 2023-04-03 PROCEDURE — 73552 X-RAY EXAM OF FEMUR 2/>: CPT

## 2023-04-03 PROCEDURE — 70450 CT HEAD/BRAIN W/O DYE: CPT

## 2023-04-03 PROCEDURE — 85610 PROTHROMBIN TIME: CPT

## 2023-04-03 PROCEDURE — 86900 BLOOD TYPING SEROLOGIC ABO: CPT

## 2023-04-03 PROCEDURE — 81001 URINALYSIS AUTO W/SCOPE: CPT

## 2023-04-03 PROCEDURE — 87635 SARS-COV-2 COVID-19 AMP PRB: CPT

## 2023-04-03 PROCEDURE — 92610 EVALUATE SWALLOWING FUNCTION: CPT

## 2023-04-03 PROCEDURE — 72125 CT NECK SPINE W/O DYE: CPT

## 2023-04-03 PROCEDURE — 84100 ASSAY OF PHOSPHORUS: CPT

## 2023-04-03 PROCEDURE — 99285 EMERGENCY DEPT VISIT HI MDM: CPT

## 2023-04-03 PROCEDURE — 94640 AIRWAY INHALATION TREATMENT: CPT

## 2023-04-03 PROCEDURE — 97165 OT EVAL LOW COMPLEX 30 MIN: CPT

## 2023-04-03 PROCEDURE — 86850 RBC ANTIBODY SCREEN: CPT

## 2023-04-03 PROCEDURE — 83735 ASSAY OF MAGNESIUM: CPT

## 2023-04-03 PROCEDURE — 86901 BLOOD TYPING SEROLOGIC RH(D): CPT

## 2023-04-03 PROCEDURE — 36415 COLL VENOUS BLD VENIPUNCTURE: CPT

## 2023-04-03 PROCEDURE — 73560 X-RAY EXAM OF KNEE 1 OR 2: CPT

## 2023-04-03 PROCEDURE — 82310 ASSAY OF CALCIUM: CPT

## 2023-04-03 PROCEDURE — 85027 COMPLETE CBC AUTOMATED: CPT

## 2023-04-03 PROCEDURE — 99239 HOSP IP/OBS DSCHRG MGMT >30: CPT

## 2023-04-03 PROCEDURE — 82652 VIT D 1 25-DIHYDROXY: CPT

## 2023-04-03 PROCEDURE — 73502 X-RAY EXAM HIP UNI 2-3 VIEWS: CPT

## 2023-04-03 PROCEDURE — 71045 X-RAY EXAM CHEST 1 VIEW: CPT

## 2023-04-03 PROCEDURE — 97161 PT EVAL LOW COMPLEX 20 MIN: CPT

## 2023-04-03 PROCEDURE — 97116 GAIT TRAINING THERAPY: CPT

## 2023-04-03 PROCEDURE — 72192 CT PELVIS W/O DYE: CPT | Mod: MB

## 2023-04-03 PROCEDURE — 97110 THERAPEUTIC EXERCISES: CPT

## 2023-04-03 PROCEDURE — 80048 BASIC METABOLIC PNL TOTAL CA: CPT

## 2023-04-03 PROCEDURE — 84436 ASSAY OF TOTAL THYROXINE: CPT

## 2023-04-03 PROCEDURE — 80053 COMPREHEN METABOLIC PANEL: CPT

## 2023-04-03 RX ORDER — FAMOTIDINE 10 MG/ML
1 INJECTION INTRAVENOUS
Qty: 0 | Refills: 0 | DISCHARGE
Start: 2023-04-03

## 2023-04-03 RX ORDER — PANTOPRAZOLE SODIUM 20 MG/1
1 TABLET, DELAYED RELEASE ORAL
Qty: 0 | Refills: 0 | DISCHARGE
Start: 2023-04-03

## 2023-04-03 RX ADMIN — Medication 12.5 MILLIGRAM(S): at 14:25

## 2023-04-03 RX ADMIN — PANTOPRAZOLE SODIUM 40 MILLIGRAM(S): 20 TABLET, DELAYED RELEASE ORAL at 06:36

## 2023-04-03 RX ADMIN — FAMOTIDINE 10 MILLIGRAM(S): 10 INJECTION INTRAVENOUS at 06:36

## 2023-04-03 RX ADMIN — Medication 75 MICROGRAM(S): at 06:36

## 2023-04-03 RX ADMIN — HEPARIN SODIUM 5000 UNIT(S): 5000 INJECTION INTRAVENOUS; SUBCUTANEOUS at 14:25

## 2023-04-03 RX ADMIN — HEPARIN SODIUM 5000 UNIT(S): 5000 INJECTION INTRAVENOUS; SUBCUTANEOUS at 06:36

## 2023-04-03 RX ADMIN — FINASTERIDE 5 MILLIGRAM(S): 5 TABLET, FILM COATED ORAL at 13:02

## 2023-04-03 RX ADMIN — OXYCODONE HYDROCHLORIDE 2.5 MILLIGRAM(S): 5 TABLET ORAL at 10:31

## 2023-04-03 NOTE — DISCHARGE NOTE NURSING/CASE MANAGEMENT/SOCIAL WORK - NSDCPEFALRISK_GEN_ALL_CORE
For information on Fall & Injury Prevention, visit: [email or web address] OR  [email or web address] OR  https://www.cdc.gov/steadi/patient.html

## 2023-04-03 NOTE — DISCHARGE NOTE NURSING/CASE MANAGEMENT/SOCIAL WORK - PATIENT PORTAL LINK FT
You can access the FollowMyHealth Patient Portal offered by [facility] by registering at the following website: [email or web address]. By joining [employer]’s FollowMyHealth portal, you will also be able to view your health information using other applications (apps) compatible with our system.

## 2023-05-30 DIAGNOSIS — S32.010A WEDGE COMPRESSION FRACTURE OF T11-T12 VERTEBRA, INITIAL ENCOUNTER FOR CLOSED FRACTURE: ICD-10-CM

## 2023-05-30 DIAGNOSIS — S22.080A WEDGE COMPRESSION FRACTURE OF T11-T12 VERTEBRA, INITIAL ENCOUNTER FOR CLOSED FRACTURE: ICD-10-CM

## 2023-06-01 ENCOUNTER — APPOINTMENT (OUTPATIENT)
Dept: NEUROSURGERY | Facility: CLINIC | Age: 88
End: 2023-06-01
Payer: MEDICARE

## 2023-06-02 ENCOUNTER — APPOINTMENT (OUTPATIENT)
Dept: NEUROSURGERY | Facility: CLINIC | Age: 88
End: 2023-06-02
Payer: MEDICARE

## 2023-06-02 DIAGNOSIS — Z01.810 ENCOUNTER FOR PREPROCEDURAL CARDIOVASCULAR EXAMINATION: ICD-10-CM

## 2023-06-02 DIAGNOSIS — Z01.818 ENCOUNTER FOR OTHER PREPROCEDURAL EXAMINATION: ICD-10-CM

## 2023-06-02 PROCEDURE — 99205 OFFICE O/P NEW HI 60 MIN: CPT | Mod: 95

## 2023-06-04 PROBLEM — Z01.818 PREOP TESTING: Status: ACTIVE | Noted: 2023-06-04

## 2023-06-04 PROBLEM — Z01.810 ENCOUNTER FOR PRE-OPERATIVE CARDIOVASCULAR CLEARANCE: Status: RESOLVED | Noted: 2023-06-04 | Resolved: 2023-06-18

## 2023-06-05 NOTE — DATA REVIEWED
[de-identified] : 5/23/23: L spine:.1.  Compression fracture at L1, likely subacute, with 60% loss of vertebral body height centrally.  2  Gas within vertebral body which may reflect AVN. 3 Mild retropulsion of superior L1 endplate into ventral epidural space producing mild spinal canal stenosis. 4. Multilevel degenerative disc disease and facet arthrosis. No spinal canal stenosis. Multilevel foraminal stenosis. 5. Grade 1 degenerative spondylolisthesis at L5-S1

## 2023-06-05 NOTE — HISTORY OF PRESENT ILLNESS
[de-identified] : The patient has given verbal consent for this telehealth visit using two-way audio and video technology.  The patient is currently located at home [address] \[facility] [address] [place] [address], and I am located at my office at [facility].\par \par ARASH PEACOCK is a 89 year male with a PMH of HTN, PE, hypothyroid, esophageal spasm, BPH, recent transient afib on beta blocker but no AC, urinary retention requiring self-cath, SBO, s/p partial resection and recent hospitalization at [facility] from 3/28/23-4/3/23 s/p fall with right acetabular fracture, sacrum fracture, and pubic rami fracture and pelvic hemorrhage and discharged to acute rehab who presents to the office today for neuroendovascular consultation due to L1 compression fracture noted on outpatient work up of severe back pain s/p fall.\par \par The pain is characterized as severe and sharp in nature.  It started one month ago. It is exacerbated by bending and relieved by rest and tylenol.  It does not radiate. The patient fell off examining table the end of March.  The patient denies numbness of extremities, weakness of extremities, bowel or bladder incontinence and gait disturbance. He has undergone imaging in the form of CT T/L spine which revealed L1 compression fracture with mild retropulsion and evidence of avascular necrosis. 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 Maximilian Mcduffie [facility]\par

## 2023-06-05 NOTE — ASSESSMENT
[FreeTextEntry1] : I have discussed the natural history and treatment options for L1 compression fracture with the patient. I explained the indications for observation and imaging surveillance, medical management, brace and surgery (kyphoplasty). I discussed the risks, benefits, possible complications and expected outcome related to each treatment option. In the end, I recommend kyphoplasty at the L1 level for pain relief. My office will reach out to the patient regarding scheduling.  He will need Endocrinology consult and Dexa scan in the near future for osteoporosis evaluation. The patient will require medical/cardiac clearance prior to the procedure. The patient understands the plan of care and is in agreement.  All questions answered to patient satisfaction.\par

## 2023-06-21 ENCOUNTER — APPOINTMENT (OUTPATIENT)
Dept: NEUROSURGERY | Facility: HOSPITAL | Age: 88
End: 2023-06-21

## 2023-08-28 NOTE — ED PROVIDER NOTE - BIRTH SEX
Katarzyna from [facility] calling, she says that Loraine is in hospice care and is c/o abdominal pain. She says she always rates the pain at 6 but gives it a 9 when Katarzyna pushes on the abdomen.  She is currently taking Tramadol for pain but Katarzyna would like to know if something else could be started for her.  Please advise.   Male

## 2023-10-06 NOTE — OCCUPATIONAL THERAPY INITIAL EVALUATION ADULT - LEVEL OF INDEPENDENCE: SIT/STAND, REHAB EVAL
[facility]  Progress Note: Critical Care  Name: Marva Garcia 71 y.o. female I MRN: [id number]  Unit/Bed#: ICU 03 I Date of Admission: 9/13/2023   Date of Service: 10/6/2023 I Hospital Day: 23    Assessment/Plan   * Acute respiratory failure with hypoxia secondary to oropharyngeal mass  Assessment & Plan  Cuffless trach placed 9/17, transitioned to cuffed on 10/3. Oxygen requirements not improving. Patient appears to be quite depressed with trach in place. For mental health patient is on buspirone, quetiapine, and sertraline. For pain, gabapentin, oxycodone scheduled and prn, and hydromorphone prn. On Guaifenesin, Atrovent and Xopenex for airway maintenance. CXR 10/1 lungs not significantly changed from prior CT: Cardiomegaly, vascular congestion, bibasilar atelectasis, pleural effusion L>R. 10/3 CXR unchanged. 14/10/80% satting 94%     Plan:  • Attempt to wean O2 requirements  • Can trial with valve during day but must be cuffed overnight  • Recommend Bronchoscopy to help open airways and better assess    Large cell lymphoma [facility])  Assessment & Plan  Biopsy on 9/17: Large B-cell lymphoma, germinal center B-cell type, c-MYC and BCL-2 double expression. Ki-67 proliferation index is up to 90%; FISH & NGS pending. Staging work-up: Currently stage II. First inpatient chemotherapy 9/22/2023- with R-EPOCH. 9/27 Rituxan. 9/28 Filgrastim. On acyclovir, Zyloprim, Diflucan, Levaquin, Zofran, Bactrim for chemo prophylaxis, will continue until ANC > 1000. Todays CBC WBC up to 6.35, ANC pending. BCx NG4D, Sputum Cx and gram stain no bacteria.  No additional fevers in past 24 hours    Plan:  · Inpatient and outpatient hematology oncology following  · Monitor ANC on CBC w Diff, can d/c filgrastim if above 1000  · Monitor CMP for tumor lysis syndrome  · Repeat BCx if patient spikes another fever  · Determine when ppx can be stopped per hemeonc, next cycle starts 10/13    (HFpEF) heart failure with preserved ejection fraction [facility])  Assessment & Plan  Wt Readings from Last 3 Encounters:   10/06/23 81.9 kg (180 lb 8.9 oz)   09/07/23 74.6 kg (164 lb 6.4 oz)   08/30/23 73.3 kg (161 lb 9.6 oz)     Lab Results   Component Value Date    LVEF 60 08/28/2023     (H) 09/29/2023     (H) 09/13/2023     Echo 8/28/23: LVEF 60%. CXR 9/22: Persistent pulmonary venous congestion with small effusions and bibasilar atelectasis. Patient has not responded well to diuresis with Lasix 40 or Bumex. Patient received 2 doses of IV Bumex 2 g 2 days ago without significant change with urine output, but Cr increased by more than 0.3 meeting criteria for an PO. Despite holding diuresis, Cr has continued to increase over past 3 days. Poor urine output.        Plan:  • BMP, magnesium; Goal Mg > 2 and K > 4; Replete prn  • HOB > 30°, Daily standing weights, Measure I/O        Depression  Assessment & Plan  Patient on Zoloft 75 daily and Seroquel evening. Patient is depressed, met with palliative yesterday for goals of care. Patient was firm that she wants to live and to fight, she is just tired. Generalized abdominal pain  Assessment & Plan  Patient reports abdominal pain is mainly in the epigastric area. Intermittently with coughing. Takes Famotidine for GERD at home. Alk phos 10/2 145, today 275.     - Continue Famotidine  - On bowel regimen with Senna and Miralax prn    Chronic Superficial thrombophlebitis  Assessment & Plan  Right forearm soreness. BUE ultrasound in setting of high risk of DVT. RUE US[de-identified] No evidence of acute or chronic deep vein thrombosis. Chronic superficial thrombophlebitis noted in the cephalic vein. Lovenox 40 no longer appropriate with PO    -Continue DVT ppx with Lovenox 30    Blister (nonthermal) of left forearm, sequela  Assessment & Plan  Blisters of LUE healing well, no signs of infection.  Daily wound care    Oropharyngeal mass  Assessment & Plan  9/14 Neck/ soft tissue CT: Large enhancing soft tissue mass identified within the left neck involving multiple spaces. Centered within the left oropharynx with extensions as described above into multiple spaces. This results in significant airway compromise. 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 Small level 3 and level 4 nodes are seen within the neck. Tracheostomy by ENT, bx & addition testing performed. Replaced on 9/26. H/o tobacco abuse, daily smoker. On nicotine while inpatient    Plan:  · ENT and Med onc following  · See acute respiratory failure and large B cell lymphoma above      Angioedema  Assessment & Plan  POA with acute respiratory failure, SOB. On physical in [place] ED: Swollen tongue, swelling soft and hard palate and almost the head of all phalanx is completely closed. Severe angioedema. Decadron 10 mg, Benadryl 25 mg given. Initially refused but eventually agreed with intubation. Prior episode of angioedema in 2020 noted. Suspected coadministration of increase the dose of tramadol and lisinopril. 2/2 oropharyngeal mass compression. Plan:  • Cuffless trach placed 9/17, transitioned to cuffed 10/3  • Tolerating trach without issue  • See acute respiratory failure with hypoxia secondary to oropharyngeal mass above    Ambulatory dysfunction  Assessment & Plan  Impacted by chronic pain syndrome, but medication regimen may also contribute. Patient currently requires tracheostomy and ICU level care. Will require PT/ OT eval before discharge. CKD (chronic kidney disease) stage 3, GFR 30-59 ml/min [facility])  Assessment & Plan  Lab Results   Component Value Date    EGFR 23 10/06/2023    EGFR 24 10/05/2023    EGFR 30 10/04/2023    CREATININE 2.07 (H) 10/06/2023    CREATININE 2.00 (H) 10/05/2023    CREATININE 1.71 (H) 10/04/2023     Baseline Cr appears to be between 0.75-1.1. Patient received 2 doses of Bumex IV 2 g yesterday as she had not been responding appropriately to IV 40 Lasix daily.   Creatinine went up by 0.5 meeting criteria for an PO. This may be prerenal intrinsic or postrenal.  Potential prerenal causes include reduced kidney perfusion due to lisinopril. Intrinsic can also be lisinopril due to ATN, AIN from chemo medications, TLS, crystaline nephropathy from acyclovir, rituxan nephrotoxicity, filgrastim glomerulonephritis. Post renal obstructive could be from urine retention from vincristine, cyclophosphamide bladder fibrosis. Suspect most likely due to medications as a combination of prerenal and intrinsic. Cr up to 2 today. FENa was 0.2%, UA shows no casts or signs of infection, urine eosinophils 0%. Patient likely has prerenal PO from volume depletion. Received 2 L NS and 1 pRBC and cr went up by 0.07 still and Na and Cl went down, suspect that volume prevented further cr rise and free water excretion impaired by kidney function, allowing hyponatremia to increase.    - Hold on Diuresis and Lisinopril  - Will consider additional hydration, but patient is volume up. Will re-evaluate after bronchoscopy    Pain syndrome, chronic  Assessment & Plan  Home regimen: Oxycodone 5 mg twice daily prn. Tylenol 650 mg every 8 hours prn. Gabapentin 600 mg 3 times daily. Medical marijuana. Lumbar radiculopathy. Impaired ambulatory dysfunction second to pain. Patient is taking opioids regularly for pain, has bowel regimen and is having bowel movements daily. Plan:  · Continue gabapentin 300 mg 3 times daily, oxycodone 5 mg 3 times daily, prn Tylenol 650 mg every 6 hours. Benign essential hypertension  Assessment & Plan  On Coreg 12.5 mg BID and Amlodipine 10 mg daily. Lisinopril 40 mg daily on hold due to PO. Patient experiencing more soft blood pressures around systolic 90 in past day. Received albumin and rechecked around calf was much higher. Given patient's palpable pulses, not concerned about hypotension at present    Plan:  · PRN hydralazine if BP >160.   · Given patient has been around -110, can hold coreg and amlodipine, parameters currently set at 110      Hyperlipidemia-resolved as of 10/2/2023  Assessment & Plan  Lab Results   Component Value Date    CHOLESTEROL 203 (H) 01/24/2023    CHOLESTEROL 177 08/11/2022    CHOLESTEROL 184 07/08/2020     Lab Results   Component Value Date    HDL 45 (L) 01/24/2023    HDL 37 (L) 08/11/2022    HDL 44 (L) 07/08/2020     Lab Results   Component Value Date    TRIG 166 (H) 09/16/2023    TRIG 160 (H) 01/24/2023    TRIG 108 08/11/2022     Lab Results   Component Value Date    NONHDLC 146 07/12/2018    [address] 207 (H) 04/29/2013     Continue outpatient diet and lifestyle modification. ICU Core Measures     A: Assess, Prevent, and Manage Pain · Has pain been assessed? Yes  · Need for changes to pain regimen? No   B: Both SAT/SAT  · N/A   C: Choice of Sedation · RASS Goal: 0 Alert and Calm or N/A patient not on sedation  · Need for changes to sedation or analgesia regimen? No   D: Delirium · CAM-ICU: Negative   E: Early Mobility  · Plan for early mobility? No   F: Family Engagement · Plan for family engagement today? No       Antibiotic Review: Continue broad spectrum secondary to severity of illness. Review of Invasive Devices:            Prophylaxis:  VTE VTE covered by:  enoxaparin, Subcutaneous, 30 mg at 10/05/23 0844       Stress Ulcer  covered byfamotidine (PEPCID) oral suspension 40 mg [[id number]], pantoprazole (PROTONIX) injection 40 mg [[id number]]          Subjective   HPI/24hr events:   Patient continues to have hypoxic events over night when trach vent pops off momentarily. She is tearful and anxious about bronchoscopy this morning. Review of Systems   Constitutional: Negative for diaphoresis and fever. Respiratory: Positive for cough. Negative for shortness of breath and wheezing. Cardiovascular: Negative for chest pain, palpitations and leg swelling. Gastrointestinal: Positive for abdominal pain. Psychiatric/Behavioral: Positive for dysphoric mood.  The patient is nervous/anxious. Objective                            Vitals I/O      Most Recent Min/Max in 24hrs   Temp 98.8 °F (37.1 °C) Temp  Min: 98.8 °F (37.1 °C)  Max: 99.5 °F (37.5 °C)   Pulse 87 Pulse  Min: 76  Max: 96   Resp 15 Resp  Min: 10  Max: 27   BP 93/52 BP  Min: 82/50  Max: 126/60   O2 Sat 94 % SpO2  Min: 88 %  Max: 99 %      Intake/Output Summary (Last 24 hours) at 10/6/2023 0744  Last data filed at 10/6/2023 0501  Gross per 24 hour   Intake 4064.5 ml   Output 600 ml   Net 3464.5 ml         Diet Enteral/Parenteral; Tube Feeding No Oral Diet; Jevity 1.5; Continuous; 20; Every 4 hours     Invasive Monitoring Physical exam    Physical Exam  Constitutional:       Appearance: She is not ill-appearing. Cardiovascular:      Rate and Rhythm: Normal rate and regular rhythm. Heart sounds: Normal heart sounds. No murmur heard. Pulmonary:      Effort: Pulmonary effort is normal.      Breath sounds: Rales present. No wheezing. Abdominal:      Tenderness: There is abdominal tenderness. There is no guarding. Comments: Unchanged on exam   Musculoskeletal:      Right lower leg: No edema. Left lower leg: No edema. Neurological:      Mental Status: She is alert.    Psychiatric:      Comments: tearful           Diagnostic Studies      No new imaging to review     Medications:  Scheduled PRN   acyclovir, 400 mg, BID  allopurinol, 300 mg, Daily  amLODIPine, 10 mg, Daily  busPIRone, 30 mg, TID  carvedilol, 12.5 mg, BID With Meals  chlorhexidine, 15 mL, Q12H ASHLEY  enoxaparin, 30 mg, Q24H ASHLEY  famotidine, 20 mg, BID  Filgrastim-aafi, 300 mcg, Daily  fluconazole, 400 mg, Daily  gabapentin, 300 mg, TID  guaiFENesin, 200 mg, Q6H  levalbuterol, 1.25 mg, Q6H   And  ipratropium, 0.5 mg, Q6H  levofloxacin, 500 mg, Q24H ASHLEY  nicotine, 21 mg, Daily  nystatin, 500,000 Units, 4x Daily  oxyCODONE, 5 mg, Q8H  QUEtiapine, 50 mg, HS  senna, 8.8 mg, HS  sertraline, 75 mg, Daily  sodium chloride, 4 mL, Q6H  sulfamethoxazole-trimethoprim, 1 tablet, Once per day on Mon Wed Fri      acetaminophen, 650 mg, Q6H PRN  HYDROmorphone, 0.2 mg, Q6H PRN  levalbuterol, 1.25 mg, Q8H PRN  ondansetron, 4 mg, Q8H PRN  oxyCODONE, 2.5 mg, Q4H PRN   Or  oxyCODONE, 5 mg, Q4H PRN  polyethylene glycol, 17 g, Daily PRN       Continuous          Labs:    CBC    Recent Labs     10/05/23  0541 10/05/23  2302 10/06/23  0524   WBC 1.71*  --  6.34   HGB 7.0* 7.6* 7.9*   HCT 22.6* 23.4* 25.5*   *  --  127*   BANDSPCT 8  --   --      BMP    Recent Labs     10/05/23  0541 10/06/23  0524   SODIUM 134* 131*   K 4.6 4.7   CL 94* 93*   CO2 33* 32   AGAP 7 6   BUN 49* 53*   CREATININE 2.00* 2.07*   CALCIUM 8.6 8.6       Coags    No recent results     Additional Electrolytes  Recent Labs     10/05/23  0541 10/06/23  0524   MG 1.9 2.4   PHOS 2.9 2.9          Blood Gas    No recent results  No recent results LFTs  Recent Labs     10/05/23  0541 10/06/23  0524   ALT 37 34   AST 34 28   ALKPHOS 284* 275*   ALB 2.6* 2.5*   TBILI 0.77 0.71       Infectious  No recent results  Glucose  Recent Labs     10/05/23  0541 10/06/23  0524   GLUC 150* 125                   Jeremi Lamar MD contact guard

## 2023-12-19 NOTE — ED PROVIDER NOTE - NS ED MD DISPO DISCHARGE CCDA
Patient/Caregiver provided printed discharge information.
Follow up with Dr. Dickinson. Take pepcid 20mg every 12 hours as needed. Return to ED for new or worsening symptoms.    Acute Abdominal Pain    WHAT YOU NEED TO KNOW:    The cause of your abdominal pain may not be found. If a cause is found, treatment will depend on what the cause is.     DISCHARGE INSTRUCTIONS:    Return to the emergency department if:     You vomit blood or cannot stop vomiting.      You have blood in your bowel movement or it looks like tar.       You have bleeding from your rectum.       Your abdomen is larger than usual, more painful, and hard.       You have severe pain in your abdomen.       You stop passing gas and having bowel movements.       You feel weak, dizzy, or faint.    Contact your healthcare provider if:     You have a fever.      You have new signs and symptoms.      Your symptoms do not get better with treatment.       You have questions or concerns about your condition or care.    Medicines may be given to decrease pain, treat an infection, and manage your symptoms. Take your medicine as directed. Call your healthcare provider if you think your medicine is not helping or if you have side effects. Tell him if you are allergic to any medicine. Keep a list of the medicines, vitamins, and herbs you take. Include the amounts, and when and why you take them. Bring the list or the pill bottles to follow-up visits. Carry your medicine list with you in case of an emergency.    Manage your symptoms:     Apply heat on your abdomen for 20 to 30 minutes every 2 hours for as many days as directed. Heat helps decrease pain and muscle spasms.       Manage your stress. Stress may cause abdominal pain. Your healthcare provider may recommend relaxation techniques and deep breathing exercises to help decrease your stress. Your healthcare provider may recommend you talk to someone about your stress or anxiety, such as a counselor or a trusted friend. Get plenty of sleep and exercise regularly.       Limit or do not drink alcohol. Alcohol can make your abdominal pain worse. Ask your healthcare provider if it is safe for you to drink alcohol. Also ask how much is safe for you to drink.       Do not smoke. Nicotine and other chemicals in cigarettes can damage your esophagus and stomach. Ask your healthcare provider for information if you currently smoke and need help to quit. E-cigarettes or smokeless tobacco still contain nicotine. Talk to your healthcare provider before you use these products.     Make changes to the food you eat as directed: Do not eat foods that cause abdominal pain or other symptoms. Eat small meals more often.     Eat more high-fiber foods if you are constipated. High-fiber foods include fruits, vegetables, whole-grain foods, and legumes.       Do not eat foods that cause gas if you have bloating. Examples include broccoli, cabbage, and cauliflower. Do not drink soda or carbonated drinks, because these may also cause gas.       Do not eat foods or drinks that contain sorbitol or fructose if you have diarrhea and bloating. Some examples are fruit juices, candy, jelly, and sugar-free gum.       Do not eat high-fat foods, such as fried foods, cheeseburgers, hot dogs, and desserts.      Limit or do not drink caffeine. Caffeine may make symptoms, such as heart burn or nausea, worse.       Drink plenty of liquids to prevent dehydration from diarrhea or vomiting. Ask your healthcare provider how much liquid to drink each day and which liquids are best for you.     Follow up with your healthcare provider as directed: Write down your questions so you remember to ask them during your visits.

## 2024-02-05 NOTE — PATIENT PROFILE ADULT - INTERNATIONAL TRAVEL
2/5/2024        RE: Johnson Acosta  [address]  [facility] [address]        [facility]    Chief Complaint   Patient presents with     RECHECK     HPI:  Johnson Acosta is a 89 year old  (5/22/1929), who is being seen today for an episodic care visit at: [facility] ([facility]) [8]. Today's concern is:   1. Cerebrovascular accident (CVA), unspecified mechanism (H)    2. Left hemiplegia (H)    3. Leukocytosis, unspecified type      Patient seen for follow up, post CVA, slurred speech, intake appropriate, dyphagia diet no longer indicated, severe L hemiplegia not improving with therapies, WBC count was 14.3 now down to 13.9, afebrile, no s/s infection, overall appears healthy.    Allergies, and PMH/PSH reviewed in EPIC today.  REVIEW OF SYSTEMS:  4 point ROS including Respiratory, CV, GI and , other than that noted in the HPI,  is negative    Objective:   /69   Pulse 72   Temp 98  F (36.7  C)   Resp 16   Wt 78.6 kg (173 lb 3.2 oz)   SpO2 95%   BMI 25.58 kg/m    GENERAL APPEARANCE:  in no distress, appears healthy  ENT:  Mouth and posterior oropharynx normal, moist mucous membranes  RESP:  lungs clear to auscultation , no respiratory distress  CV:  regular rate and rhythm, no murmur, rub, or gallop, no edema  ABDOMEN:  bowel sounds normal  M/S:   Gait and station abnormal transfer assist  SKIN:  Inspection of skin and subcutaneous tissue baseline  NEURO:   Examination of sensation by touch normal  PSYCH:  affect and mood normal    Labs done in SNF are in [facility]. Please refer to them using [facility]/Care Everywhere.    Assessment/Plan:  (I63.9) Cerebrovascular accident (CVA), unspecified mechanism (H)  (primary encounter diagnosis)  (G81.94) Left hemiplegia (H)  Comment: CVA 1/20, slurred speech, sever hemiplegia  Plan: PT/OT working with  -continue ASA, statin and xarelto  -follow up neurology 6-8 weeks  -change diet to regular, no choking/coughing    (D72.829)  Leukocytosis, unspecified type  Comment: WBC was 14.3 now 13.9, afebrile  Plan: consider mild elevation patient norm  -repeat CBC in 2-3 weeks    MED REC REQUIRED  Post Medication Reconciliation Status: medication reconcilation previously completed during another office visit          Electronically signed by: BRAYDEN Storey CNP          Sincerely,        BRAYDEN Storey CNP       No

## 2024-05-10 NOTE — ED PROVIDER NOTE - CHIEF COMPLAINT
My signature below certifies that the above stated patient is homebound and upon completion of the Face-To-Face encounter, has the need for intermittent skilled nursing, physical therapy and/or speech or occupational therapy services in their home for their current diagnosis as outlined in their initial plan of care. These services will continue to be monitored by myself or another physician.
The patient is a 89y Male complaining of fall.

## 2024-09-08 NOTE — OCCUPATIONAL THERAPY INITIAL EVALUATION ADULT - ADDITIONAL COMMENTS
Prior to admission, pt performed ADLs with wife assist, using shower chair in walk in shower, and RW/SC for ambulation.
No

## 2024-10-11 ENCOUNTER — NON-APPOINTMENT (OUTPATIENT)
Age: 89
End: 2024-10-11

## 2024-10-11 ENCOUNTER — APPOINTMENT (OUTPATIENT)
Dept: PULMONOLOGY | Facility: CLINIC | Age: 89
End: 2024-10-11
Payer: MEDICARE

## 2024-10-11 ENCOUNTER — RX RENEWAL (OUTPATIENT)
Age: 89
End: 2024-10-11

## 2024-10-11 VITALS
HEART RATE: 92 BPM | DIASTOLIC BLOOD PRESSURE: 79 MMHG | BODY MASS INDEX: 17.5 KG/M2 | TEMPERATURE: 97.4 F | HEIGHT: 71 IN | RESPIRATION RATE: 13 BRPM | WEIGHT: 125 LBS | OXYGEN SATURATION: 95 % | SYSTOLIC BLOOD PRESSURE: 145 MMHG

## 2024-10-11 VITALS — DIASTOLIC BLOOD PRESSURE: 81 MMHG | SYSTOLIC BLOOD PRESSURE: 128 MMHG

## 2024-10-11 DIAGNOSIS — R05.9 COUGH, UNSPECIFIED: ICD-10-CM

## 2024-10-11 DIAGNOSIS — J90 PLEURAL EFFUSION, NOT ELSEWHERE CLASSIFIED: ICD-10-CM

## 2024-10-11 DIAGNOSIS — R13.10 DYSPHAGIA, UNSPECIFIED: ICD-10-CM

## 2024-10-11 DIAGNOSIS — R09.82 POSTNASAL DRIP: ICD-10-CM

## 2024-10-11 DIAGNOSIS — G47.33 OBSTRUCTIVE SLEEP APNEA (ADULT) (PEDIATRIC): ICD-10-CM

## 2024-10-11 DIAGNOSIS — J98.11 ATELECTASIS: ICD-10-CM

## 2024-10-11 DIAGNOSIS — R06.02 SHORTNESS OF BREATH: ICD-10-CM

## 2024-10-11 PROCEDURE — 99215 OFFICE O/P EST HI 40 MIN: CPT

## 2024-10-11 RX ORDER — METOPROLOL TARTRATE 25 MG/1
25 TABLET ORAL
Refills: 0 | Status: ACTIVE | COMMUNITY
Start: 2024-10-11

## 2024-10-11 RX ORDER — FLUTICASONE PROPIONATE 50 UG/1
50 SPRAY, METERED NASAL TWICE DAILY
Qty: 48 | Refills: 0 | Status: ACTIVE | COMMUNITY
Start: 2024-10-11 | End: 1900-01-01

## 2024-10-11 RX ORDER — ENOXAPARIN SODIUM 100 MG/ML
100 INJECTION SUBCUTANEOUS
Refills: 0 | Status: ACTIVE | COMMUNITY
Start: 2024-10-11

## 2024-10-11 RX ORDER — LEVOTHYROXINE SODIUM 0.1 MG/1
100 TABLET ORAL
Refills: 0 | Status: ACTIVE | COMMUNITY
Start: 2024-10-11

## 2024-10-14 ENCOUNTER — APPOINTMENT (OUTPATIENT)
Dept: PULMONOLOGY | Facility: CLINIC | Age: 89
End: 2024-10-14
Payer: MEDICARE

## 2024-10-14 PROCEDURE — 99211 OFF/OP EST MAY X REQ PHY/QHP: CPT

## 2024-10-24 ENCOUNTER — APPOINTMENT (OUTPATIENT)
Dept: PULMONOLOGY | Facility: CLINIC | Age: 89
End: 2024-10-24

## 2024-11-04 ENCOUNTER — APPOINTMENT (OUTPATIENT)
Dept: PULMONOLOGY | Facility: CLINIC | Age: 89
End: 2024-11-04

## 2024-11-11 ENCOUNTER — APPOINTMENT (OUTPATIENT)
Dept: PULMONOLOGY | Facility: CLINIC | Age: 89
End: 2024-11-11

## 2025-02-11 ENCOUNTER — APPOINTMENT (OUTPATIENT)
Dept: OTOLARYNGOLOGY | Facility: CLINIC | Age: 89
End: 2025-02-11
Payer: MEDICARE

## 2025-02-11 DIAGNOSIS — T17.998A OTHER FOREIGN OBJECT IN RESPIRATORY TRACT, PART UNSPECIFIED CAUSING OTHER INJURY, INITIAL ENCOUNTER: ICD-10-CM

## 2025-02-11 DIAGNOSIS — R49.8 OTHER VOICE AND RESONANCE DISORDERS: ICD-10-CM

## 2025-02-11 PROCEDURE — 31575 DIAGNOSTIC LARYNGOSCOPY: CPT

## 2025-02-11 PROCEDURE — 99203 OFFICE O/P NEW LOW 30 MIN: CPT | Mod: 25

## 2025-02-11 RX ORDER — APIXABAN 5 MG/1
TABLET, FILM COATED ORAL
Refills: 0 | Status: ACTIVE | COMMUNITY

## 2025-02-11 RX ORDER — LEVOTHYROXINE SODIUM 0.17 MG/1
TABLET ORAL
Refills: 0 | Status: ACTIVE | COMMUNITY

## 2025-02-28 ENCOUNTER — APPOINTMENT (OUTPATIENT)
Dept: OTOLARYNGOLOGY | Facility: CLINIC | Age: 89
End: 2025-02-28
Payer: MEDICARE

## 2025-02-28 PROCEDURE — 92610 EVALUATE SWALLOWING FUNCTION: CPT | Mod: GN

## 2025-03-03 ENCOUNTER — APPOINTMENT (OUTPATIENT)
Dept: OTOLARYNGOLOGY | Facility: CLINIC | Age: 89
End: 2025-03-03
Payer: MEDICARE

## 2025-03-03 PROCEDURE — 92612 ENDOSCOPY SWALLOW (FEES) VID: CPT | Mod: GN

## 2025-03-19 ENCOUNTER — APPOINTMENT (OUTPATIENT)
Dept: OTOLARYNGOLOGY | Facility: CLINIC | Age: 89
End: 2025-03-19
Payer: MEDICARE

## 2025-03-19 VITALS
DIASTOLIC BLOOD PRESSURE: 78 MMHG | HEART RATE: 99 BPM | TEMPERATURE: 97.8 F | OXYGEN SATURATION: 96 % | WEIGHT: 145 LBS | BODY MASS INDEX: 20.22 KG/M2 | SYSTOLIC BLOOD PRESSURE: 135 MMHG

## 2025-03-19 DIAGNOSIS — K11.7 DISTURBANCES OF SALIVARY SECRETION: ICD-10-CM

## 2025-03-19 DIAGNOSIS — R13.10 DYSPHAGIA, UNSPECIFIED: ICD-10-CM

## 2025-03-19 DIAGNOSIS — R49.8 OTHER VOICE AND RESONANCE DISORDERS: ICD-10-CM

## 2025-03-19 DIAGNOSIS — R49.0 DYSPHONIA: ICD-10-CM

## 2025-03-19 PROCEDURE — 92526 ORAL FUNCTION THERAPY: CPT | Mod: GN

## 2025-03-19 PROCEDURE — 31579 LARYNGOSCOPY TELESCOPIC: CPT

## 2025-03-19 PROCEDURE — 99215 OFFICE O/P EST HI 40 MIN: CPT | Mod: 25

## 2025-03-19 PROCEDURE — 70371 SPEECH EVALUATION COMPLEX: CPT | Mod: 59

## 2025-07-21 ENCOUNTER — NON-APPOINTMENT (OUTPATIENT)
Age: 89
End: 2025-07-21

## 2025-07-22 ENCOUNTER — APPOINTMENT (OUTPATIENT)
Dept: OTOLARYNGOLOGY | Facility: CLINIC | Age: 89
End: 2025-07-22
Payer: MEDICARE

## 2025-07-22 ENCOUNTER — APPOINTMENT (OUTPATIENT)
Dept: OTOLARYNGOLOGY | Facility: CLINIC | Age: 89
End: 2025-07-22

## 2025-07-22 PROCEDURE — 92612 ENDOSCOPY SWALLOW (FEES) VID: CPT | Mod: GN

## 2025-07-25 NOTE — ED ADULT NURSE NOTE - NS_ED_NURSE_TEACHING_TOPIC_ED_A_ED
Render In Strict Bullet Format?: No Continue Regimen: Wart paste ([employer]) in 1 week under occlusion Detail Level: Zone Plan: Paired down with curette at today’s visit. pcp/

## 2025-08-01 ENCOUNTER — APPOINTMENT (OUTPATIENT)
Dept: RADIOLOGY | Facility: HOSPITAL | Age: 89
End: 2025-08-01

## 2025-08-01 ENCOUNTER — OUTPATIENT (OUTPATIENT)
Dept: OUTPATIENT SERVICES | Facility: HOSPITAL | Age: 89
LOS: 1 days | End: 2025-08-01

## 2025-08-01 PROCEDURE — 74230 X-RAY XM SWLNG FUNCJ C+: CPT

## 2025-08-01 PROCEDURE — 92611 MOTION FLUOROSCOPY/SWALLOW: CPT | Mod: GN

## 2025-08-05 ENCOUNTER — APPOINTMENT (OUTPATIENT)
Dept: OTOLARYNGOLOGY | Facility: CLINIC | Age: 89
End: 2025-08-05

## 2025-08-05 DIAGNOSIS — R13.10 DYSPHAGIA, UNSPECIFIED: ICD-10-CM

## 2025-08-05 DIAGNOSIS — T17.998A OTHER FOREIGN OBJECT IN RESPIRATORY TRACT, PART UNSPECIFIED CAUSING OTHER INJURY, INITIAL ENCOUNTER: ICD-10-CM

## 2025-08-05 DIAGNOSIS — R49.0 DYSPHONIA: ICD-10-CM

## 2025-08-05 PROCEDURE — 99214 OFFICE O/P EST MOD 30 MIN: CPT | Mod: 93
